# Patient Record
Sex: MALE | Race: BLACK OR AFRICAN AMERICAN | NOT HISPANIC OR LATINO | Employment: FULL TIME | URBAN - METROPOLITAN AREA
[De-identification: names, ages, dates, MRNs, and addresses within clinical notes are randomized per-mention and may not be internally consistent; named-entity substitution may affect disease eponyms.]

---

## 2017-02-27 ENCOUNTER — GENERIC CONVERSION - ENCOUNTER (OUTPATIENT)
Dept: OTHER | Facility: OTHER | Age: 52
End: 2017-02-27

## 2017-03-11 ENCOUNTER — TRANSCRIBE ORDERS (OUTPATIENT)
Dept: LAB | Facility: CLINIC | Age: 52
End: 2017-03-11

## 2017-03-11 ENCOUNTER — APPOINTMENT (OUTPATIENT)
Dept: LAB | Facility: CLINIC | Age: 52
End: 2017-03-11
Payer: COMMERCIAL

## 2017-03-11 DIAGNOSIS — N52.01 ERECTILE DYSFUNCTION DUE TO ARTERIAL INSUFFICIENCY: ICD-10-CM

## 2017-03-11 DIAGNOSIS — N52.01 ERECTILE DYSFUNCTION DUE TO ARTERIAL INSUFFICIENCY: Primary | ICD-10-CM

## 2017-03-11 PROCEDURE — 36415 COLL VENOUS BLD VENIPUNCTURE: CPT

## 2017-03-11 PROCEDURE — 84403 ASSAY OF TOTAL TESTOSTERONE: CPT

## 2017-03-11 PROCEDURE — 84402 ASSAY OF FREE TESTOSTERONE: CPT

## 2017-03-13 LAB
TESTOST FREE SERPL-MCNC: 6.2 PG/ML (ref 7.2–24)
TESTOST SERPL-MCNC: 268 NG/DL (ref 348–1197)
TESTOSTERONE COMMENT: ABNORMAL

## 2017-04-08 ENCOUNTER — APPOINTMENT (OUTPATIENT)
Dept: LAB | Facility: CLINIC | Age: 52
End: 2017-04-08
Payer: COMMERCIAL

## 2017-04-08 DIAGNOSIS — N52.01 ERECTILE DYSFUNCTION DUE TO ARTERIAL INSUFFICIENCY: ICD-10-CM

## 2017-04-08 LAB
FSH SERPL-ACNC: 7.9 MIU/ML (ref 0.7–10.8)
LH SERPL-ACNC: 3.5 MIU/ML (ref 1.2–10.6)
PROLACTIN SERPL-MCNC: 12.2 NG/ML (ref 2.5–17.4)

## 2017-04-08 PROCEDURE — 84403 ASSAY OF TOTAL TESTOSTERONE: CPT

## 2017-04-08 PROCEDURE — 84146 ASSAY OF PROLACTIN: CPT | Performed by: UROLOGY

## 2017-04-08 PROCEDURE — 83001 ASSAY OF GONADOTROPIN (FSH): CPT | Performed by: UROLOGY

## 2017-04-08 PROCEDURE — 84402 ASSAY OF FREE TESTOSTERONE: CPT

## 2017-04-08 PROCEDURE — 83002 ASSAY OF GONADOTROPIN (LH): CPT | Performed by: UROLOGY

## 2017-04-08 PROCEDURE — 36415 COLL VENOUS BLD VENIPUNCTURE: CPT

## 2017-04-10 LAB
TESTOST FREE SERPL-MCNC: 10.4 PG/ML (ref 7.2–24)
TESTOST SERPL-MCNC: 409 NG/DL (ref 348–1197)
TESTOSTERONE COMMENT: NORMAL

## 2018-02-09 ENCOUNTER — HOSPITAL ENCOUNTER (EMERGENCY)
Facility: HOSPITAL | Age: 53
Discharge: HOME | End: 2018-02-09
Attending: EMERGENCY MEDICINE
Payer: COMMERCIAL

## 2018-02-09 VITALS
RESPIRATION RATE: 16 BRPM | TEMPERATURE: 98.2 F | SYSTOLIC BLOOD PRESSURE: 157 MMHG | BODY MASS INDEX: 29 KG/M2 | DIASTOLIC BLOOD PRESSURE: 89 MMHG | WEIGHT: 213.85 LBS | OXYGEN SATURATION: 98 % | HEART RATE: 61 BPM

## 2018-02-09 DIAGNOSIS — T15.92XA FOREIGN BODY, EYE, LEFT, INITIAL ENCOUNTER: Primary | ICD-10-CM

## 2018-02-09 PROCEDURE — 99283 EMERGENCY DEPT VISIT LOW MDM: CPT

## 2018-02-09 PROCEDURE — 90471 IMMUNIZATION ADMIN: CPT

## 2018-02-09 PROCEDURE — 90715 TDAP VACCINE 7 YRS/> IM: CPT | Performed by: EMERGENCY MEDICINE

## 2018-02-09 RX ORDER — PROPARACAINE HYDROCHLORIDE 5 MG/ML
1 SOLUTION/ DROPS OPHTHALMIC ONCE
Status: COMPLETED | OUTPATIENT
Start: 2018-02-09 | End: 2018-02-09

## 2018-02-09 RX ORDER — TOBRAMYCIN 3 MG/ML
1 SOLUTION/ DROPS OPHTHALMIC ONCE
Status: COMPLETED | OUTPATIENT
Start: 2018-02-09 | End: 2018-02-09

## 2018-02-09 RX ADMIN — PROPARACAINE HYDROCHLORIDE 1 DROP: 5 SOLUTION/ DROPS OPHTHALMIC at 20:59

## 2018-02-09 RX ADMIN — TOBRAMYCIN 1 DROP: 3 SOLUTION OPHTHALMIC at 21:46

## 2018-02-09 RX ADMIN — TETANUS TOXOID, REDUCED DIPHTHERIA TOXOID AND ACELLULAR PERTUSSIS VACCINE, ADSORBED 0.5 ML: 5; 2.5; 8; 8; 2.5 SUSPENSION INTRAMUSCULAR at 20:58

## 2018-02-09 RX ADMIN — FLUORESCEIN SODIUM 1 STRIP: 0.6 STRIP OPHTHALMIC at 21:00

## 2018-02-10 NOTE — DISCHARGE INSTRUCTIONS
Eye Foreign Body   WHAT YOU NEED TO KNOW:   What is an eye foreign body (EFB)? An EFB is an object that gets stuck in your eye  Tiny pieces of metal, dust, wood, and sand are the most common foreign bodies  What are the signs and symptoms of an EFB? · The feeling that something is in your eye    · Eye pain, redness, or watering    · Sensitivity to light    · Blurry vision or changes in your vision  How is an EFB diagnosed? Your healthcare provider will ask about your symptoms and examine your eye  The provider may check your vision by asking you to read letters or numbers off of a chart  You may need any of the following:  · A slit-lamp test  uses a microscope to look into your eye and check for injury  A dye may be used to look for scratches or other damage to your eye  · Ultrasound or CT  pictures may show where the foreign body is located in your eye  It may also show damage to deeper parts of your eye  You may be given contrast liquid to help your eye show up better in pictures  Tell the healthcare provider if you have ever had an allergic reaction to contrast liquid  How is an EFB treated? Medicines will be given to decrease pain or prevent an infection  Your healthcare provider may numb your eye and flush it with liquid to help remove the FB  The provider may also use a cotton swab or other tools to help remove the FB  If the FB is hard to remove or has damaged deeper parts of your eye, you will need surgery to remove it  What can I do to help my eye heal?  You may have pain, sensitivity to light, or blurry vision for a few days  Do the following to help your eye heal:  · Do not rub your eye  This may cause more damage or infection  · Do not wear your contacts lenses until your eye heals  Ask your healthcare provider how long to follow this direction  · Wear sunglasses as directed  Sunglasses help protect the eye and decrease sensitivity to light    What can I do to prevent another EFB?   · Wear safety glasses, eye shields, or goggles  These items can prevent eye injury  Make sure the eyewear wraps around the sides of your face  Wear these items while you work with chemicals, metal, wood, or bodily fluids such as blood  Also wear protective eyewear during sports such as racquetball or swimming  Do not use regular eye glasses for eye protection  They will not protect your eyes from foreign bodies or chemicals  · Use contact lenses as directed  Wash your hands before you clean, insert, or remove your contacts  Insert and remove contact lenses correctly  Clean and change your contacts as directed to help prevent eye damage or infection  When should I seek immediate care? · You suddenly lose your vision  · You have severe eye pain  When should I contact my healthcare provider? · You have new or worse eye swelling  · Your symptoms do not get better, even after the foreign body is removed  · You have white or yellow fluid draining from your eye  · You have questions or concerns about your condition or care  CARE AGREEMENT:   You have the right to help plan your care  Learn about your health condition and how it may be treated  Discuss treatment options with your caregivers to decide what care you want to receive  You always have the right to refuse treatment  The above information is an  only  It is not intended as medical advice for individual conditions or treatments  Talk to your doctor, nurse or pharmacist before following any medical regimen to see if it is safe and effective for you  © 2017 [address] Information is for End User's use only and may not be sold, redistributed or otherwise used for commercial purposes  All illustrations and images included in CareNotes® are the copyrighted property of A D [employer] M [employer]  or Gumaro Maher

## 2018-02-10 NOTE — ED PROVIDER NOTES
History  Chief Complaint   Patient presents with    Foreign Body in Eye     pt presents with left eye redness and irritation since last night  pt reports he was cutting metal and believes a piece got in his eye     15-year-old male presents for evaluation of a suspected metallic foreign body to his eye  He was working with steel tonight at his job, his glasses had fogged up so he was not wearing them appropriately  He felt a metallic foreign body hit his eye so he comes to the emergency department for evaluation  He states this happened in the past   He has an unknown last tetanus  He is having some visual difficulty because of the metallic foreign body in pain  He states that he wears reading glasses but does not use contact lenses  No pain with extraocular movement  He has no other complaints at this time  None       History reviewed  No pertinent past medical history  History reviewed  No pertinent surgical history  History reviewed  No pertinent family history  I have reviewed and agree with the history as documented  Social History   Substance Use Topics    Smoking status: Never Smoker    Smokeless tobacco: Never Used    Alcohol use No        Review of Systems   Constitutional: Negative for chills and fever  HENT: Negative for congestion and sore throat  Eyes: Positive for pain, redness and visual disturbance  Negative for photophobia, discharge and itching  Respiratory: Negative for chest tightness and shortness of breath  Cardiovascular: Negative for chest pain  Gastrointestinal: Negative for abdominal pain, nausea and vomiting  Musculoskeletal: Negative for back pain, neck pain and neck stiffness  Skin: Negative for rash and wound  Allergic/Immunologic: Negative for immunocompromised state  Neurological: Negative for dizziness and headaches         Physical Exam  ED Triage Vitals [02/09/18 2006]   Temperature Pulse Respirations Blood Pressure SpO2   98 2 °F (36 8 °C) 61 16 157/89 98 %      Temp Source Heart Rate Source Patient Position - Orthostatic VS BP Location FiO2 (%)   Oral Monitor Lying Right arm --      Pain Score       No Pain           Orthostatic Vital Signs  Vitals:    02/09/18 2006   BP: 157/89   Pulse: 61   Patient Position - Orthostatic VS: Lying       Physical Exam   Constitutional: He is oriented to person, place, and time  He appears well-developed and well-nourished  No distress  HENT:   Head: Normocephalic and atraumatic  Mouth/Throat: Oropharynx is clear and moist    Eyes: EOM are normal  Left eye exhibits no discharge  Left eye with a metal foreign body visualized in the cornea  Neck: Normal range of motion  Neck supple  Pulmonary/Chest: Effort normal and breath sounds normal  No respiratory distress  Musculoskeletal: Normal range of motion  Neurological: He is alert and oriented to person, place, and time  No cranial nerve deficit  Skin: Skin is warm and dry  He is not diaphoretic  No pallor  Psychiatric: He has a normal mood and affect  His behavior is normal    Vitals reviewed        ED Medications  Medications   proparacaine (ALCAINE) 0 5 % ophthalmic solution 1 drop (1 drop Both Eyes Given 2/9/18 2059)   fluorescein sodium sterile ophthalmic strip 1 strip (1 strip Left Eye Given 2/9/18 2100)   tetanus-diphtheria-acellular pertussis (BOOSTRIX) IM injection 0 5 mL (0 5 mL Intramuscular Given 2/9/18 2058)   tobramycin (TOBREX) 0 3 % ophthalmic solution 1 drop (1 drop Left Eye Given 2/9/18 2146)       Diagnostic Studies  Results Reviewed     None                 No orders to display              Procedures  Foreign Body - Ocular  Date/Time: 2/9/2018 10:02 PM  Performed by: Irvin Overall by: Marti Redman     Patient location:  ED  Other Assisting Provider: Yes (comment) (Nurse)    Consent:     Consent obtained:  Verbal    Consent given by:  Patient    Risks discussed:  Incomplete removal, visual impairment, worsening of condition, pain, infection, corneal damage and bleeding    Alternatives discussed:  No treatment and delayed treatment  Universal protocol:     Patient identity confirmed:  Verbally with patient  Location:     Location:  L conjunctival    Depth:  Embedded  Pre-procedure details:     Imaging:  None    Fluorescein exam: yes      Fluorescein uptake: yes      Gracia test: negative      Corneal abrasion location:  Medial  Anesthesia (see MAR for exact dosages):     Local anesthetic:  Proparacaine drops  Procedure details:     Localization method:  Direct visualization    Removal mechanism:  25 G needle    Foreign bodies recovered:  1    Description:  Metal    Intact foreign body removal: yes      Residual rust ring observed: no      Residual rust ring removed: no    Post-procedure details:     Confirmation:  No additional foreign bodies on visualization    Dressing:  Antibiotic drops    Patient tolerance of procedure: Tolerated well, no immediate complications           Phone Contacts  ED Phone Contact    ED Course  ED Course                                MDM  Number of Diagnoses or Management Options  Foreign body, eye, left, initial encounter:   Diagnosis management comments: Ocular foreign body  Attempted removal with a needle  Ocular foreign body seems to be removed at this time  Advised the patient follow-up with [facility]  Tetanus shot updated  Discussed with patient and they understood the risks and benefits of discharge  Patient had opportunity to ask questions regarding care and discharge instructions and had no further questions  Advised follow up with PCP, advised returning if worsening, and discussed disease specific return precautions  Patient understood discharge instructions         CritCare Time    Disposition  Final diagnoses:   Foreign body, eye, left, initial encounter     Time reflects when diagnosis was documented in both MDM as applicable and the Disposition within this note     Time User Action Codes Description Comment    2/9/2018 10:00 PM [place], [address] Foreign body, eye, left, initial encounter       ED Disposition     ED Disposition Condition Comment    Discharge  Jone Garcia discharge to home/self care  Condition at discharge: Good        Follow-up Information     Follow up With Specialties Details Why Contact Info Additional [facility] Emergency Department Emergency Medicine  If symptoms worsen: worsening vision, eye pain, fever/chills, etc [phone] [place] ED, [address], [place], [address] Ophthalmology Call in the morning for an appointment to be seen as soon as possible [address]  [phone]           There are no discharge medications for this patient  No discharge procedures on file      ED Provider  Electronically Signed by           Joao Mitchell DO  02/09/18 2190

## 2018-12-21 ENCOUNTER — HOSPITAL ENCOUNTER (EMERGENCY)
Facility: HOSPITAL | Age: 53
Discharge: HOME | End: 2018-12-21
Attending: EMERGENCY MEDICINE | Admitting: EMERGENCY MEDICINE
Payer: COMMERCIAL

## 2018-12-21 ENCOUNTER — APPOINTMENT (EMERGENCY)
Dept: RADIOLOGY | Facility: HOSPITAL | Age: 53
End: 2018-12-21
Payer: COMMERCIAL

## 2018-12-21 VITALS
BODY MASS INDEX: 28.85 KG/M2 | TEMPERATURE: 98.2 F | HEIGHT: 72 IN | WEIGHT: 213 LBS | HEART RATE: 71 BPM | OXYGEN SATURATION: 97 % | RESPIRATION RATE: 20 BRPM | DIASTOLIC BLOOD PRESSURE: 89 MMHG | SYSTOLIC BLOOD PRESSURE: 148 MMHG

## 2018-12-21 DIAGNOSIS — M25.519 SHOULDER PAIN: Primary | ICD-10-CM

## 2018-12-21 PROCEDURE — 73030 X-RAY EXAM OF SHOULDER: CPT

## 2018-12-21 PROCEDURE — 99283 EMERGENCY DEPT VISIT LOW MDM: CPT

## 2018-12-21 RX ORDER — IBUPROFEN 600 MG/1
600 TABLET ORAL ONCE
Status: COMPLETED | OUTPATIENT
Start: 2018-12-21 | End: 2018-12-21

## 2018-12-21 RX ORDER — METHOCARBAMOL 500 MG/1
1000 TABLET, FILM COATED ORAL ONCE
Status: COMPLETED | OUTPATIENT
Start: 2018-12-21 | End: 2018-12-21

## 2018-12-21 RX ORDER — DIAZEPAM 10 MG/1
TABLET ORAL
Qty: 12 TABLET | Refills: 0 | Status: SHIPPED | OUTPATIENT
Start: 2018-12-21

## 2018-12-21 RX ORDER — IBUPROFEN 600 MG/1
600 TABLET ORAL EVERY 6 HOURS PRN
Qty: 30 TABLET | Refills: 0 | Status: SHIPPED | OUTPATIENT
Start: 2018-12-21 | End: 2018-12-24

## 2018-12-21 RX ADMIN — IBUPROFEN 600 MG: 600 TABLET ORAL at 21:36

## 2018-12-21 RX ADMIN — METHOCARBAMOL 1000 MG: 500 TABLET ORAL at 21:36

## 2018-12-22 NOTE — DISCHARGE INSTRUCTIONS
Shoulder Pain, Ambulatory Care   GENERAL INFORMATION:   Shoulder pain  is a common problem and can affect your daily activities  Pain can be caused by a problem within your shoulder  Shoulder pain may also be caused by pain that spreads to your shoulder from another part of your body  Seek immediate care for the following symptoms:   · Severe pain    · Inability to move your arm or shoulder    · Numbness or tingling in your shoulder or arm  Treatment for shoulder pain  may include any of the following:  · Acetaminophen  decreases pain and fever  It is available without a doctor's order  Ask how much to take and how often to take it  Follow directions  Acetaminophen can cause liver damage if not taken correctly  · NSAIDs  help decrease swelling and pain or fever  This medicine is available with or without a doctor's order  NSAIDs can cause stomach bleeding or kidney problems in certain people  If you take blood thinner medicine, always ask your healthcare provider if NSAIDs are safe for you  Always read the medicine label and follow directions  · A steroid injection  may help decrease pain and swelling  · Surgery  may be needed for long-term pain and loss of function  Manage your symptoms:   · Apply ice  on your shoulder for 20 to 30 minutes every 2 hours or as directed  Use an ice pack, or put crushed ice in a plastic bag  Cover it with a towel  Ice helps prevent tissue damage and decreases swelling and pain  · Apply heat if ice does not help your symptoms  Apply heat on your shoulder for 20 to 30 minutes every 2 hours for as many days as directed  Heat helps decrease pain and muscle spasms  · Go to physical or occupational therapy as directed  A physical therapist teaches you exercises to help improve movement and strength, and to decrease pain  An occupational therapist teaches you skills to help with your daily activities  Prevent shoulder pain:   · Stretch and strengthen your shoulder  Use proper technique during exercises and sports  · Limit activities as directed  Try to avoid repeated overhead movements  Follow up with your healthcare provider or orthopedist as directed:  Write down your questions so you remember to ask them during your visits  CARE AGREEMENT:   You have the right to help plan your care  Learn about your health condition and how it may be treated  Discuss treatment options with your caregivers to decide what care you want to receive  You always have the right to refuse treatment  The above information is an  only  It is not intended as medical advice for individual conditions or treatments  Talk to your doctor, nurse or pharmacist before following any medical regimen to see if it is safe and effective for you  © 2014 [address] is for End User's use only and may not be sold, redistributed or otherwise used for commercial purposes  All illustrations and images included in CareNotes® are the copyrighted property of A D A M , Inc  or Gumaro Maher

## 2018-12-22 NOTE — ED PROVIDER NOTES
History  Chief Complaint   Patient presents with    Shoulder Pain     pt presents ambulatory with c/o L shoulder pain x1 week, denies injury     [de-identified] year old male patient presents emergency department for evaluation of left shoulder pain states atraumatic  The patient does work in construction, he is lifting heavy objects frequently, states the pain has been worsening over the last week  Patient has no nidus of injury or any other source of damage to the shoulder done than chronic use  The area is normal in its appearance, there is no bony or soft tissue abnormalities  There is no rash or other physical exam findings that would explain the patient's pain  The patient does not have tenderness of the bicipital groove  Patient will have an x-ray done of the shoulder to assess for any underlying bony abnormalities and if not are present patient be treated for degenerative changes  X-rays reviewed interpreted by me and shows no acute osseous abnormalities  Patient will be discharged with antispasmodics and anti-inflammatories  History provided by:  Patient   used: No    Shoulder Pain   Location:  Shoulder  Shoulder location:  L shoulder  Injury: no    Pain details:     Quality:  Aching    Radiates to:  Does not radiate    Severity:  Mild    Onset quality:  Gradual    Timing:  Constant    Progression:  Unchanged  Foreign body present:  No foreign bodies  Prior injury to area:  No  Relieved by:  Nothing  Worsened by:  Nothing  Ineffective treatments:  None tried  Associated symptoms: stiffness    Associated symptoms: no fatigue, no fever, no neck pain and no tingling    Risk factors: no concern for non-accidental trauma        None       History reviewed  No pertinent past medical history  History reviewed  No pertinent surgical history  History reviewed  No pertinent family history  I have reviewed and agree with the history as documented      Social History   Substance Use Topics    Smoking status: Never Smoker    Smokeless tobacco: Never Used    Alcohol use No        Review of Systems   Constitutional: Negative for fatigue and fever  Musculoskeletal: Positive for stiffness  Negative for neck pain  All other systems reviewed and are negative  Physical Exam  Physical Exam   Constitutional: He is oriented to person, place, and time  He appears well-developed and well-nourished  No distress  HENT:   Head: Normocephalic and atraumatic  Right Ear: External ear normal    Left Ear: External ear normal    Eyes: Conjunctivae and EOM are normal  Right eye exhibits no discharge  Left eye exhibits no discharge  No scleral icterus  Neck: Normal range of motion  Neck supple  No JVD present  No tracheal deviation present  No thyromegaly present  Cardiovascular: Normal rate and regular rhythm  Pulmonary/Chest: Effort normal and breath sounds normal  No stridor  No respiratory distress  He has no wheezes  He has no rales  Abdominal: Soft  Bowel sounds are normal  He exhibits no distension  There is no tenderness  Musculoskeletal: Normal range of motion  He exhibits no edema, tenderness or deformity  Neurological: He is alert and oriented to person, place, and time  No cranial nerve deficit  Coordination normal    Skin: Skin is warm and dry  He is not diaphoretic  Psychiatric: He has a normal mood and affect  His behavior is normal    Nursing note and vitals reviewed        Vital Signs  ED Triage Vitals   Temperature Pulse Respirations Blood Pressure SpO2   12/21/18 2023 12/21/18 2021 12/21/18 2021 12/21/18 2021 12/21/18 2021   98 2 °F (36 8 °C) 71 20 148/89 97 %      Temp Source Heart Rate Source Patient Position - Orthostatic VS BP Location FiO2 (%)   12/21/18 2023 12/21/18 2021 12/21/18 2021 12/21/18 2021 --   Oral Monitor Sitting Right arm       Pain Score       12/21/18 2136       6           Vitals:    12/21/18 2021   BP: 148/89   Pulse: 71   Patient Position - Orthostatic VS: Sitting       Visual Acuity      ED Medications  Medications   methocarbamol (ROBAXIN) tablet 1,000 mg (1,000 mg Oral Given 12/21/18 2136)   ibuprofen (MOTRIN) tablet 600 mg (600 mg Oral Given 12/21/18 2136)       Diagnostic Studies  Results Reviewed     None                 XR shoulder 2+ views LEFT   ED Interpretation by Amisha Maria DO (12/21 2127)   Normal exam      Final Result by Callie Martino DO (12/22 6096)   Degenerative changes of the shoulder and acromioclavicular joint  No acute osseous abnormality  Workstation performed: [id number]                    Procedures  Procedures       Phone Contacts  ED Phone Contact    ED Course                               MDM  Number of Diagnoses or Management Options  Shoulder pain: new and requires workup     Amount and/or Complexity of Data Reviewed  Tests in the radiology section of CPT®: reviewed and ordered  Decide to obtain previous medical records or to obtain history from someone other than the patient: yes  Review and summarize past medical records: yes    Patient Progress  Patient progress: stable    CritCare Time    Disposition  Final diagnoses:   Shoulder pain     Time reflects when diagnosis was documented in both MDM as applicable and the Disposition within this note     Time User Action Codes Description Comment    12/21/2018  9:28 PM Simon Litten Add [M25 519] Shoulder pain       ED Disposition     ED Disposition Condition Comment    Discharge  [place] discharge to home/self care      Condition at discharge: Good        Follow-up Information     Follow up With Specialties Details Why [address] Rosemary Lomeli MD Internal Medicine   [facility]  [phone]      Yue P[place], [facility] [id number]            Discharge Medication List as of 12/21/2018  9:30 PM      START taking these medications    Details   diazepam (VALIUM) 10 mg tablet Take on every 8 hours for three days as needed, Normal      ibuprofen (MOTRIN) 600 mg tablet Take 1 tablet (600 mg total) by mouth every 6 (six) hours as needed for mild pain for up to 3 days, Starting Fri 12/21/2018, Until Mon 12/24/2018, Normal           No discharge procedures on file      ED Provider  Electronically Signed by           Lana Medeiros DO  12/22/18 [place]

## 2019-11-05 ENCOUNTER — HOSPITAL ENCOUNTER (EMERGENCY)
Facility: HOSPITAL | Age: 54
Discharge: HOME | End: 2019-11-05
Attending: EMERGENCY MEDICINE
Payer: COMMERCIAL

## 2019-11-05 VITALS
OXYGEN SATURATION: 98 % | TEMPERATURE: 98.6 F | BODY MASS INDEX: 28.91 KG/M2 | RESPIRATION RATE: 16 BRPM | DIASTOLIC BLOOD PRESSURE: 90 MMHG | HEIGHT: 72 IN | WEIGHT: 213.41 LBS | HEART RATE: 72 BPM | SYSTOLIC BLOOD PRESSURE: 148 MMHG

## 2019-11-05 DIAGNOSIS — S86.112A RUPTURE OF LEFT GASTROCNEMIUS TENDON, INITIAL ENCOUNTER: Primary | ICD-10-CM

## 2019-11-05 PROCEDURE — 99284 EMERGENCY DEPT VISIT MOD MDM: CPT | Performed by: NURSE PRACTITIONER

## 2019-11-05 PROCEDURE — 99283 EMERGENCY DEPT VISIT LOW MDM: CPT

## 2019-11-05 RX ORDER — HYDROCODONE BITARTRATE AND ACETAMINOPHEN 5; 325 MG/1; MG/1
1 TABLET ORAL EVERY 6 HOURS PRN
Qty: 12 TABLET | Refills: 0 | Status: SHIPPED | OUTPATIENT
Start: 2019-11-05

## 2019-11-05 RX ORDER — NAPROXEN 500 MG/1
500 TABLET ORAL 2 TIMES DAILY WITH MEALS
Qty: 20 TABLET | Refills: 0 | Status: SHIPPED | OUTPATIENT
Start: 2019-11-05 | End: 2021-12-03

## 2019-11-05 NOTE — ED PROVIDER NOTES
History  Chief Complaint   Patient presents with    Leg Pain     Pt reports he was running to catch to the train yesterday, and felt something hit him in the left calf, not sure what  C/o left leg pain, diff bearing weight  This is a 52-year-old male patient was running to catch a train yesterday and felt a pop or snap over his left posterior leg over the medial gastrocnemius  My concern is that he partially tore his gastrocnemius and is at risk for Achilles tendon tear  Prior to Admission Medications   Prescriptions Last Dose Informant Patient Reported? Taking?   diazepam (VALIUM) 10 mg tablet   No No   Sig: Take on every 8 hours for three days as needed   ibuprofen (MOTRIN) 600 mg tablet   No No   Sig: Take 1 tablet (600 mg total) by mouth every 6 (six) hours as needed for mild pain for up to 3 days      Facility-Administered Medications: None       History reviewed  No pertinent past medical history  History reviewed  No pertinent surgical history  History reviewed  No pertinent family history  I have reviewed and agree with the history as documented  Social History     Tobacco Use    Smoking status: Never Smoker    Smokeless tobacco: Never Used   Substance Use Topics    Alcohol use: No    Drug use: No        Review of Systems   Constitutional: Negative for diaphoresis, fatigue and fever  HENT: Negative for congestion, ear pain, nosebleeds and sore throat  Eyes: Negative for photophobia, pain, discharge and visual disturbance  Respiratory: Negative for cough, choking, chest tightness, shortness of breath and wheezing  Cardiovascular: Negative for chest pain and palpitations  Gastrointestinal: Negative for abdominal distention, abdominal pain, diarrhea and vomiting  Genitourinary: Negative for dysuria, flank pain and frequency  Musculoskeletal: Negative for back pain, gait problem and joint swelling  Skin: Negative for color change and rash     Neurological: Negative for dizziness, syncope and headaches  Psychiatric/Behavioral: Negative for behavioral problems and confusion  The patient is not nervous/anxious  All other systems reviewed and are negative  Physical Exam  Physical Exam   Constitutional: He is oriented to person, place, and time  He appears well-developed and well-nourished  No distress  HENT:   Head: Normocephalic and atraumatic  Eyes: Conjunctivae and EOM are normal  Right eye exhibits no discharge  Left eye exhibits no discharge  Neck: Normal range of motion  Neck supple  Cardiovascular: Normal rate  Pulmonary/Chest: Effort normal  No respiratory distress  Abdominal: He exhibits no distension  There is no guarding  Musculoskeletal: He exhibits no edema or deformity  Legs:  Tenderness over the gastrocnemius tendon  He has full plantar and dorsiflexion of the affected extremity  Palpable Achilles cord  Neurological: He is alert and oriented to person, place, and time  Coordination normal    Skin: Skin is warm and dry  Psychiatric: He has a normal mood and affect  Nursing note and vitals reviewed        Vital Signs  ED Triage Vitals [11/05/19 0827]   Temperature Pulse Respirations Blood Pressure SpO2   98 6 °F (37 °C) 72 16 148/90 98 %      Temp Source Heart Rate Source Patient Position - Orthostatic VS BP Location FiO2 (%)   Oral Monitor Sitting Right arm --      Pain Score       8           Vitals:    11/05/19 0827   BP: 148/90   Pulse: 72   Patient Position - Orthostatic VS: Sitting         Visual Acuity      ED Medications  Medications - No data to display    Diagnostic Studies  Results Reviewed     None                 No orders to display              Procedures  Procedures       ED Course                               MDM  Number of Diagnoses or Management Options  Rupture of left gastrocnemius tendon, initial encounter: new and requires workup  Diagnosis management comments: Concern for gastrocnemius tendon rupture given history  Recommend nonweightbearing to evaluated by Orthopedics  Will likely need some physical therapy  Verbally discussed restrictions and concerns  Patient Progress  Patient progress: stable      Disposition  Final diagnoses:   Rupture of left gastrocnemius tendon, initial encounter     Time reflects when diagnosis was documented in both MDM as applicable and the Disposition within this note     Time User Action Codes Description Comment    11/5/2019  9:36 AM Shakeel Mark Add [[id number] [id number]] Rupture of left gastrocnemius tendon, initial encounter       ED Disposition     ED Disposition Condition Date/Time Comment    Discharge Stable Tue Nov 5, 2019  9:35 AM Joshua Sneed discharge to home/self care  Follow-up Information     Follow up With Specialties Details Why Contact Info    [employer], DO Sports Medicine Schedule an appointment as soon as possible for a visit  For Continued Evaluation [address] [place] [address] [employer]  [address]  [place] [address]  [phone]            Patient's Medications   Discharge Prescriptions    HYDROCODONE-ACETAMINOPHEN (NORCO) 5-325 MG PER TABLET    Take 1 tablet by mouth every 6 (six) hours as needed (Not relieved by Anti-inflammatory) for up to 12 dosesMax Daily Amount: 4 tablets       Start Date: 11/5/2019 End Date: --       Order Dose: 1 tablet       Quantity: 12 tablet    Refills: 0    NAPROXEN (NAPROSYN) 500 MG TABLET    Take 1 tablet (500 mg total) by mouth 2 (two) times a day with meals for 20 doses       Start Date: 11/5/2019 End Date: 11/15/2019       Order Dose: 500 mg       Quantity: 20 tablet    Refills: 0     No discharge procedures on file      ED Provider  Electronically Signed by           FRANCIS Minor  11/05/19 4201

## 2019-11-05 NOTE — DISCHARGE INSTRUCTIONS
Recommend nonweightbearing until evaluated by the orthopedic sports medicine doctor  Call today to schedule follow-up appointment

## 2019-11-06 ENCOUNTER — OFFICE VISIT (OUTPATIENT)
Dept: OBGYN CLINIC | Facility: MEDICAL CENTER | Age: 54
End: 2019-11-06
Payer: COMMERCIAL

## 2019-11-06 VITALS
WEIGHT: 210 LBS | HEIGHT: 72 IN | DIASTOLIC BLOOD PRESSURE: 81 MMHG | BODY MASS INDEX: 28.44 KG/M2 | HEART RATE: 79 BPM | SYSTOLIC BLOOD PRESSURE: 129 MMHG

## 2019-11-06 DIAGNOSIS — S86.112A STRAIN OF GASTROCNEMIUS MUSCLE OF LEFT LOWER EXTREMITY, INITIAL ENCOUNTER: Primary | ICD-10-CM

## 2019-11-06 DIAGNOSIS — M79.662 PAIN OF LEFT CALF: ICD-10-CM

## 2019-11-06 PROCEDURE — 99203 OFFICE O/P NEW LOW 30 MIN: CPT | Performed by: ORTHOPAEDIC SURGERY

## 2019-11-06 RX ORDER — ASPIRIN 81 MG/1
81 TABLET ORAL DAILY
COMMUNITY

## 2019-11-06 NOTE — LETTER
November 6, 2019     Patient: Simon Cook   YOB: 1965   Date of Visit: 11/6/2019       To Whom it May Concern:    Simon Cook is under my professional care  He was seen in my office on 11/6/2019  He may return to work on 11/11/19 with the restriction of no climbing or ladders  If you have any questions or concerns, please don't hesitate to call           Sincerely,          Bronwyn Byrnes MD        CC: No Recipients

## 2019-11-06 NOTE — PROGRESS NOTES
Assessment:   Diagnosis ICD-10-CM Associated Orders   1  Strain of gastrocnemius muscle of left lower extremity, initial encounter S86 112A    2  Pain of left calf M79 662        Plan:  The patient has an examination consistent with a gastroc strain  I have discussed with the patient the pathophysiology of this diagnosis and reviewed how the examination correlates with this diagnosis  Treatment options were discussed at length and after discussing these treatment options, the patient was instructed to ace wrap the calf  He may return to work with the restriction of no climbing ladders  He was also provided with stretching exercises  Continue Naproxen with food  To do next visit:  Return in about 4 weeks (around 12/4/2019)  The above stated was discussed in layman's terms and the patient expressed understanding  All questions were answered to the patient's satisfaction  Subjective:   Naren Ramirez is a 49 y o  male who presents with a chief complaint of left calf pain  He states he was running to catch the train on 11/04/19 when he it felt like something hit him in the calf  He was seen in the ED yesterday who was concerned about a possible gastrocnemius tear  He presents today on crutches with an ace wrap around the calf  Patient states he has increased pain with WB and is relieved by rest  He denies any numbness or tingling in the left lower extremity  Review of systems negative unless otherwise specified in HPI    History reviewed  No pertinent past medical history      Past Surgical History:   Procedure Laterality Date    CARDIAC SURGERY      a-fib 2013       Family History   Problem Relation Age of Onset    Diabetes Mother     No Known Problems Father        Social History     Occupational History    Not on file   Tobacco Use    Smoking status: Never Smoker    Smokeless tobacco: Never Used   Substance and Sexual Activity    Alcohol use: Yes     Frequency: 2-4 times a month    Drug use: No    Sexual activity: Not on file         Current Outpatient Medications:     aspirin (ECOTRIN LOW STRENGTH) 81 mg EC tablet, Take 81 mg by mouth daily, Disp: , Rfl:     diazepam (VALIUM) 10 mg tablet, Take on every 8 hours for three days as needed, Disp: 12 tablet, Rfl: 0    HYDROcodone-acetaminophen (NORCO) 5-325 mg per tablet, Take 1 tablet by mouth every 6 (six) hours as needed (Not relieved by Anti-inflammatory) for up to 12 dosesMax Daily Amount: 4 tablets, Disp: 12 tablet, Rfl: 0    ibuprofen (MOTRIN) 600 mg tablet, Take 1 tablet (600 mg total) by mouth every 6 (six) hours as needed for mild pain for up to 3 days, Disp: 30 tablet, Rfl: 0    naproxen (NAPROSYN) 500 mg tablet, Take 1 tablet (500 mg total) by mouth 2 (two) times a day with meals for 20 doses (Patient not taking: Reported on 11/6/2019), Disp: 20 tablet, Rfl: 0    No Known Allergies         Vitals:    11/06/19 0935   BP: 129/81   Pulse: 79       Objective:                    Right Ankle Exam     Tenderness   Right ankle tenderness location: medial gastrocnemius  Swelling: mild    Range of Motion   The patient has normal right ankle ROM  Muscle Strength   The patient has normal right ankle strength  Other   Erythema: absent  Sensation: normal  Pulse: present     Comments:    Negative Brambila test  Achilles is palpable and non tender on exam today            Diagnostics, reviewed and taken today if performed as documented:    None performed        Procedures, if performed today:    Procedures    None performed      Portions of the record may have been created with voice recognition software  Occasional wrong word or "sound a like" substitutions may have occurred due to the inherent limitations of voice recognition software  Read the chart carefully and recognize, using context, where substitutions have occurred        Scribe Attestation    I,:   Leobardo Brice am acting as a scribe while in the presence of the attending physician :        I,:   Clare Lui MD personally performed the services described in this documentation    as scribed in my presence :

## 2020-01-28 ENCOUNTER — APPOINTMENT (EMERGENCY)
Dept: RADIOLOGY | Facility: HOSPITAL | Age: 55
End: 2020-01-28
Payer: COMMERCIAL

## 2020-01-28 ENCOUNTER — HOSPITAL ENCOUNTER (EMERGENCY)
Facility: HOSPITAL | Age: 55
Discharge: HOME | End: 2020-01-29
Attending: EMERGENCY MEDICINE | Admitting: EMERGENCY MEDICINE
Payer: COMMERCIAL

## 2020-01-28 VITALS
HEART RATE: 63 BPM | TEMPERATURE: 98.3 F | DIASTOLIC BLOOD PRESSURE: 89 MMHG | SYSTOLIC BLOOD PRESSURE: 138 MMHG | HEIGHT: 72 IN | BODY MASS INDEX: 28.22 KG/M2 | WEIGHT: 208.34 LBS | OXYGEN SATURATION: 97 % | RESPIRATION RATE: 20 BRPM

## 2020-01-28 DIAGNOSIS — R07.9 CHEST PAIN: Primary | ICD-10-CM

## 2020-01-28 LAB
ALBUMIN SERPL BCP-MCNC: 3.8 G/DL (ref 3.5–5)
ALP SERPL-CCNC: 68 U/L (ref 46–116)
ALT SERPL W P-5'-P-CCNC: 35 U/L (ref 12–78)
ANION GAP SERPL CALCULATED.3IONS-SCNC: 5 MMOL/L (ref 4–13)
AST SERPL W P-5'-P-CCNC: 23 U/L (ref 5–45)
BASOPHILS # BLD AUTO: 0.04 THOUSANDS/ΜL (ref 0–0.1)
BASOPHILS NFR BLD AUTO: 1 % (ref 0–1)
BILIRUB SERPL-MCNC: 0.5 MG/DL (ref 0.2–1)
BUN SERPL-MCNC: 15 MG/DL (ref 5–25)
CALCIUM SERPL-MCNC: 8.7 MG/DL (ref 8.3–10.1)
CHLORIDE SERPL-SCNC: 105 MMOL/L (ref 100–108)
CO2 SERPL-SCNC: 29 MMOL/L (ref 21–32)
CREAT SERPL-MCNC: 0.97 MG/DL (ref 0.6–1.3)
EOSINOPHIL # BLD AUTO: 0.56 THOUSAND/ΜL (ref 0–0.61)
EOSINOPHIL NFR BLD AUTO: 12 % (ref 0–6)
ERYTHROCYTE [DISTWIDTH] IN BLOOD BY AUTOMATED COUNT: 12.2 % (ref 11.6–15.1)
GFR SERPL CREATININE-BSD FRML MDRD: 103 ML/MIN/1.73SQ M
GLUCOSE SERPL-MCNC: 94 MG/DL (ref 65–140)
HCT VFR BLD AUTO: 41.4 % (ref 36.5–49.3)
HGB BLD-MCNC: 14 G/DL (ref 12–17)
IMM GRANULOCYTES # BLD AUTO: 0.01 THOUSAND/UL (ref 0–0.2)
IMM GRANULOCYTES NFR BLD AUTO: 0 % (ref 0–2)
LYMPHOCYTES # BLD AUTO: 1.91 THOUSANDS/ΜL (ref 0.6–4.47)
LYMPHOCYTES NFR BLD AUTO: 43 % (ref 14–44)
MCH RBC QN AUTO: 29 PG (ref 26.8–34.3)
MCHC RBC AUTO-ENTMCNC: 33.8 G/DL (ref 31.4–37.4)
MCV RBC AUTO: 86 FL (ref 82–98)
MONOCYTES # BLD AUTO: 0.42 THOUSAND/ΜL (ref 0.17–1.22)
MONOCYTES NFR BLD AUTO: 9 % (ref 4–12)
NEUTROPHILS # BLD AUTO: 1.56 THOUSANDS/ΜL (ref 1.85–7.62)
NEUTS SEG NFR BLD AUTO: 35 % (ref 43–75)
NRBC BLD AUTO-RTO: 0 /100 WBCS
PLATELET # BLD AUTO: 164 THOUSANDS/UL (ref 149–390)
PMV BLD AUTO: 12.2 FL (ref 8.9–12.7)
POTASSIUM SERPL-SCNC: 4.2 MMOL/L (ref 3.5–5.3)
PROT SERPL-MCNC: 7.4 G/DL (ref 6.4–8.2)
RBC # BLD AUTO: 4.83 MILLION/UL (ref 3.88–5.62)
SODIUM SERPL-SCNC: 139 MMOL/L (ref 136–145)
TROPONIN I SERPL-MCNC: <0.02 NG/ML
WBC # BLD AUTO: 4.5 THOUSAND/UL (ref 4.31–10.16)

## 2020-01-28 PROCEDURE — 84484 ASSAY OF TROPONIN QUANT: CPT | Performed by: PHYSICIAN ASSISTANT

## 2020-01-28 PROCEDURE — 36415 COLL VENOUS BLD VENIPUNCTURE: CPT | Performed by: PHYSICIAN ASSISTANT

## 2020-01-28 PROCEDURE — 80053 COMPREHEN METABOLIC PANEL: CPT | Performed by: PHYSICIAN ASSISTANT

## 2020-01-28 PROCEDURE — 71046 X-RAY EXAM CHEST 2 VIEWS: CPT

## 2020-01-28 PROCEDURE — 93005 ELECTROCARDIOGRAM TRACING: CPT

## 2020-01-28 PROCEDURE — 85025 COMPLETE CBC W/AUTO DIFF WBC: CPT | Performed by: PHYSICIAN ASSISTANT

## 2020-01-28 PROCEDURE — 99285 EMERGENCY DEPT VISIT HI MDM: CPT

## 2020-01-29 PROBLEM — R07.9 CHEST PAIN: Status: ACTIVE | Noted: 2020-01-29

## 2020-01-29 LAB
ATRIAL RATE: 63 BPM
P AXIS: 10 DEGREES
PR INTERVAL: 152 MS
QRS AXIS: 52 DEGREES
QRSD INTERVAL: 88 MS
QT INTERVAL: 402 MS
QTC INTERVAL: 411 MS
T WAVE AXIS: 52 DEGREES
VENTRICULAR RATE: 63 BPM

## 2020-01-29 PROCEDURE — 93010 ELECTROCARDIOGRAM REPORT: CPT | Performed by: INTERNAL MEDICINE

## 2020-01-29 PROCEDURE — 99283 EMERGENCY DEPT VISIT LOW MDM: CPT | Performed by: PHYSICIAN ASSISTANT

## 2020-01-29 NOTE — ED PROVIDER NOTES
History  Chief Complaint   Patient presents with    Chest Pain     pt c/o chest pain and left sided numbness lthat started a couple hours ago, hx of Afib     Patient is a 67-year-old male presents emergency department with complaints of chest pain that began 2 hours ago  Patient states the pain is nonradiating  He 20 shortness of breath, fever, chills, abdominal pain, nausea, vomiting, dizziness  Patient states he has had history of atrial fibrillation the past which was treated with ablation  Patient states the pain began after lifting up something heavy  Prior to Admission Medications   Prescriptions Last Dose Informant Patient Reported? Taking? HYDROcodone-acetaminophen (NORCO) 5-325 mg per tablet   No No   Sig: Take 1 tablet by mouth every 6 (six) hours as needed (Not relieved by Anti-inflammatory) for up to 12 dosesMax Daily Amount: 4 tablets   aspirin (ECOTRIN LOW STRENGTH) 81 mg EC tablet   Yes No   Sig: Take 81 mg by mouth daily   diazepam (VALIUM) 10 mg tablet   No No   Sig: Take on every 8 hours for three days as needed   ibuprofen (MOTRIN) 600 mg tablet   No No   Sig: Take 1 tablet (600 mg total) by mouth every 6 (six) hours as needed for mild pain for up to 3 days   naproxen (NAPROSYN) 500 mg tablet   No No   Sig: Take 1 tablet (500 mg total) by mouth 2 (two) times a day with meals for 20 doses   Patient not taking: Reported on 11/6/2019      Facility-Administered Medications: None       Past Medical History:   Diagnosis Date    Unspecified atrial fibrillation ([address] )     Resolved 6/29/2016        Past Surgical History:   Procedure Laterality Date    CARDIAC SURGERY      a-fib 2013       Family History   Problem Relation Age of Onset    Diabetes Mother     Diabetes type II Mother     Diabetes Father     Hypertension Family         Ess hypertension      I have reviewed and agree with the history as documented      Social History     Tobacco Use    Smoking status: Never Smoker    Smokeless tobacco: Never Used   Substance Use Topics    Alcohol use: Yes     Frequency: 2-4 times a month    Drug use: No        Review of Systems   Constitutional: Negative for fever  Respiratory: Negative for shortness of breath  Cardiovascular: Positive for chest pain  Neurological: Negative for dizziness and light-headedness  All other systems reviewed and are negative  Physical Exam  Physical Exam   Constitutional: He is oriented to person, place, and time  He appears well-developed and well-nourished  HENT:   Head: Normocephalic and atraumatic  Eyes: Pupils are equal, round, and reactive to light  EOM are normal    Neck: Normal range of motion  Cardiovascular: Normal rate, regular rhythm and normal pulses  Pulmonary/Chest: Effort normal and breath sounds normal    Abdominal: Soft  Bowel sounds are normal    Musculoskeletal:        Right lower leg: Normal         Left lower leg: Normal    Neurological: He is alert and oriented to person, place, and time  Skin: Skin is warm  Capillary refill takes less than 2 seconds  Psychiatric: He has a normal mood and affect  His behavior is normal    Vitals reviewed        Vital Signs  ED Triage Vitals [01/28/20 2305]   Temperature Pulse Respirations Blood Pressure SpO2   98 3 °F (36 8 °C) 63 20 138/89 97 %      Temp Source Heart Rate Source Patient Position - Orthostatic VS BP Location FiO2 (%)   Oral Monitor Lying Right arm --      Pain Score       3           Vitals:    01/28/20 2305   BP: 138/89   Pulse: 63   Patient Position - Orthostatic VS: Lying         Visual Acuity      ED Medications  Medications - No data to display    Diagnostic Studies  Results Reviewed     Procedure Component Value Units Date/Time    Troponin I [[id number]]  (Normal) Collected:  01/28/20 2319    Lab Status:  Final result Specimen:  Blood from Arm, Right Updated:  01/28/20 2352     Troponin I <0 02 ng/mL     Comprehensive metabolic panel [[id number]] Collected:  01/28/20 2319    Lab Status:  Final result Specimen:  Blood from Arm, Right Updated:  01/28/20 2350     Sodium 139 mmol/L      Potassium 4 2 mmol/L      Chloride 105 mmol/L      CO2 29 mmol/L      ANION GAP 5 mmol/L      BUN 15 mg/dL      Creatinine 0 97 mg/dL      Glucose 94 mg/dL      Calcium 8 7 mg/dL      AST 23 U/L      ALT 35 U/L      Alkaline Phosphatase 68 U/L      Total Protein 7 4 g/dL      Albumin 3 8 g/dL      Total Bilirubin 0 50 mg/dL      eGFR 103 ml/min/1 73sq m     Narrative:       National Kidney Disease Foundation guidelines for Chronic Kidney Disease (CKD):     Stage 1 with normal or high GFR (GFR > 90 mL/min/1 73 square meters)    Stage 2 Mild CKD (GFR = 60-89 mL/min/1 73 square meters)    Stage 3A Moderate CKD (GFR = 45-59 mL/min/1 73 square meters)    Stage 3B Moderate CKD (GFR = 30-44 mL/min/1 73 square meters)    Stage 4 Severe CKD (GFR = 15-29 mL/min/1 73 square meters)    Stage 5 End Stage CKD (GFR <15 mL/min/1 73 square meters)  Note: GFR calculation is accurate only with a steady state creatinine    CBC and differential [[id number]]  (Abnormal) Collected:  01/28/20 2319    Lab Status:  Final result Specimen:  Blood from Arm, Right Updated:  01/28/20 2326     WBC 4 50 Thousand/uL      RBC 4 83 Million/uL      Hemoglobin 14 0 g/dL      Hematocrit 41 4 %      MCV 86 fL      MCH 29 0 pg      MCHC 33 8 g/dL      RDW 12 2 %      MPV 12 2 fL      Platelets 179 Thousands/uL      nRBC 0 /100 WBCs      Neutrophils Relative 35 %      Immat GRANS % 0 %      Lymphocytes Relative 43 %      Monocytes Relative 9 %      Eosinophils Relative 12 %      Basophils Relative 1 %      Neutrophils Absolute 1 56 Thousands/µL      Immature Grans Absolute 0 01 Thousand/uL      Lymphocytes Absolute 1 91 Thousands/µL      Monocytes Absolute 0 42 Thousand/µL      Eosinophils Absolute 0 56 Thousand/µL      Basophils Absolute 0 04 Thousands/µL                  XR chest pa & lateral   Final Result by Bianca Ervin MD (01/29 0013)      No acute cardiopulmonary disease  Workstation performed: [id number]                    Procedures  ECG 12 Lead Documentation Only  Date/Time: 1/29/2020 1:45 AM  Performed by: Wellington Moreno PA-C  Authorized by: Wellington Moreno PA-C     Indications / Diagnosis:  Chest pain  ECG reviewed by me, the ED Provider: yes    Patient location:  ED  Previous ECG:     Previous ECG:  Unavailable  Interpretation:     Interpretation: normal    Rate:     ECG rate:  63    ECG rate assessment: normal    Rhythm:     Rhythm: sinus rhythm               ED Course         HEART Risk Score      Most Recent Value   History  0 Filed at: 01/29/2020 0003   ECG  0 Filed at: 01/29/2020 0003   Age  1 Filed at: 01/29/2020 0003   Risk Factors  0 Filed at: 01/29/2020 0003   Troponin  0 Filed at: 01/29/2020 0003   Heart Score Risk Calculator   History  0 Filed at: 01/29/2020 0003   ECG  0 Filed at: 01/29/2020 0003   Age  1 Filed at: 01/29/2020 0003   Risk Factors  0 Filed at: 01/29/2020 0003   Troponin  0 Filed at: 01/29/2020 0003   HEART Score  1 Filed at: 01/29/2020 0003   HEART Score  1 Filed at: 01/29/2020 0003                            MDM  Number of Diagnoses or Management Options  Chest pain:   Diagnosis management comments:   Patient is a 78-year-old male presents emergency department complaints of chest pain that is nonradiating  Lab evaluation was performed does not show any abnormality  There is no evidence of ACS this time  EKG was unremarkable  Chest x-ray was unremarkable  Findings were discussed with patient  Recommend follow-up with cardiology for further evaluation  Return parameters were discussed  Patient stable for discharge  Patient is low-risk for ACS by the HEART score and has approx 1 7-2 5% risk of MACE in the next 30d given the negative EKG and initial cardiac troponin  I discussed with the patient the option of obtaining a repeat cardiac troponin and EKG at the t+3 hr time point  The patient declined stating that he/she was comfortable with the risk of 30d MACE as described  Patient was advised to follow with PMD as soon as possible for further evaluation for additional investigation; ED return precautions regarding chest pain symptoms concerning for ACS were discussed (e g , dyspnea, exertional pain, radiation of pain to shoulders, diaphoresis, vomiting)  Patient expressed understanding and agreed to plan  Six, POLINA WHITFIELD, DANNY Diaz, and NAHID Aldridge  2008  Chest pain in the emergency room: value of the HEART score  [place] heart journal?: monthly journal of the [place] of Cardiology and the [employer], no  6      DANNY Coyle, POLINA Arevalo, NAHID Aldridge, M NARENDRA Carrasco, POLINA Ramon R ANIA Flores, et al  2013  A prospective validation of the HEART score for chest pain patients at the emergency department  International journal of cardiology, no  3 (March 7)  doi:10 1016/j ijcard  2013 01 255  Amount and/or Complexity of Data Reviewed  Clinical lab tests: ordered and reviewed  Tests in the radiology section of CPT®: ordered and reviewed  Independent visualization of images, tracings, or specimens: yes    Risk of Complications, Morbidity, and/or Mortality  Presenting problems: moderate  Diagnostic procedures: moderate  Management options: moderate    Patient Progress  Patient progress: stable        Disposition  Final diagnoses:   Chest pain     Time reflects when diagnosis was documented in both MDM as applicable and the Disposition within this note     Time User Action Codes Description Comment    1/29/2020 12:03 AM Jodi Del Rio Add [R07 9] Chest pain       ED Disposition     ED Disposition Condition Date/Time Comment    Discharge Good Wed Jan 29, 2020 Jorge Hayes discharge to home/self care              Follow-up Information     Follow up With Specialties Details Why Contact Info Additional [facility] [address]  Cardiology Schedule an appointment as soon as possible for a visit   [address]  [facility] [place]  [phone]  [facility] [address] [facility], [address], [address] [facility] [place], [place], [address]           Discharge Medication List as of 1/29/2020 12:03 AM      CONTINUE these medications which have NOT CHANGED    Details   aspirin (ECOTRIN LOW STRENGTH) 81 mg EC tablet Take 81 mg by mouth daily, Historical Med      diazepam (VALIUM) 10 mg tablet Take on every 8 hours for three days as needed, Normal      HYDROcodone-acetaminophen (NORCO) 5-325 mg per tablet Take 1 tablet by mouth every 6 (six) hours as needed (Not relieved by Anti-inflammatory) for up to 12 dosesMax Daily Amount: 4 tablets, Starting Tue 11/5/2019, Normal      ibuprofen (MOTRIN) 600 mg tablet Take 1 tablet (600 mg total) by mouth every 6 (six) hours as needed for mild pain for up to 3 days, Starting Fri 12/21/2018, Until Mon 12/24/2018, Normal      naproxen (NAPROSYN) 500 mg tablet Take 1 tablet (500 mg total) by mouth 2 (two) times a day with meals for 20 doses, Starting Tue 11/5/2019, Until Fri 11/15/2019, Normal           No discharge procedures on file      ED Provider  Electronically Signed by           Vee Parra PA-C  01/29/20 7548

## 2021-05-20 ENCOUNTER — OFFICE VISIT (OUTPATIENT)
Dept: INTERNAL MEDICINE CLINIC | Facility: CLINIC | Age: 56
End: 2021-05-20
Payer: COMMERCIAL

## 2021-05-20 VITALS
DIASTOLIC BLOOD PRESSURE: 88 MMHG | WEIGHT: 210.2 LBS | TEMPERATURE: 98.3 F | HEART RATE: 70 BPM | OXYGEN SATURATION: 98 % | SYSTOLIC BLOOD PRESSURE: 120 MMHG | HEIGHT: 71 IN | BODY MASS INDEX: 29.43 KG/M2

## 2021-05-20 DIAGNOSIS — Z00.00 ANNUAL PHYSICAL EXAM: Primary | ICD-10-CM

## 2021-05-20 DIAGNOSIS — L98.9 SKIN LESION OF BACK: ICD-10-CM

## 2021-05-20 DIAGNOSIS — Z12.11 COLON CANCER SCREENING: ICD-10-CM

## 2021-05-20 DIAGNOSIS — R39.11 URINARY HESITANCY: ICD-10-CM

## 2021-05-20 PROCEDURE — 3725F SCREEN DEPRESSION PERFORMED: CPT | Performed by: FAMILY MEDICINE

## 2021-05-20 PROCEDURE — 3008F BODY MASS INDEX DOCD: CPT | Performed by: FAMILY MEDICINE

## 2021-05-20 PROCEDURE — 1036F TOBACCO NON-USER: CPT | Performed by: FAMILY MEDICINE

## 2021-05-20 PROCEDURE — 99386 PREV VISIT NEW AGE 40-64: CPT | Performed by: FAMILY MEDICINE

## 2021-05-20 NOTE — PATIENT INSTRUCTIONS
Sleep hygiene:   Avoidance of caffeine sources is strongly encouraged  Sleep hygiene issues are reviewed  Try over the counter Unisom or Melatonin as a sleep aid  Get your fasting blood work completed    SCHEDULE YOUR COLONOSCOPY AS SOON AS POSSIBLE  MAKE SURE TO FOLLOW UP WITH DERMATOLOGY ABOUT THE CYST ON YOUR BACK  Wellness Visit for Adults   AMBULATORY CARE:   A wellness visit  is when you see your healthcare provider to get screened for health problems  Your healthcare provider will also give you advice on how to stay healthy  Write down your questions so you remember to ask them  Ask your healthcare provider how often you should have a wellness visit  What happens at a wellness visit:  Your healthcare provider will ask about your health, and your family history of health problems  This includes high blood pressure, heart disease, and cancer  He or she will ask if you have symptoms that concern you, if you smoke, and about your mood  You may also be asked about your intake of medicines, supplements, food, and alcohol  Any of the following may be done:  · Your weight  will be checked  Your height may also be checked so your body mass index (BMI) can be calculated  Your BMI shows if you are at a healthy weight  · Your blood pressure  and heart rate will be checked  Your temperature may also be checked  · Blood and urine tests  may be done  Blood tests may be done to check your cholesterol levels  Abnormal cholesterol levels increase your risk for heart disease and stroke  You may also need a blood or urine test to check for diabetes if you are at increased risk  Urine tests may be done to look for signs of an infection or kidney disease  · A physical exam  includes checking your heartbeat and lungs with a stethoscope  Your healthcare provider may also check your skin to look for sun damage  · Screening tests  may be recommended   A screening test is done to check for diseases that may not cause symptoms  The screening tests you may need depend on your age, gender, family history, and lifestyle habits  For example, colorectal screening may be recommended if you are 48years old or older  Screening tests you need if you are a woman:   · A Pap smear  is used to screen for cervical cancer  Pap smears are usually done every 3 to 5 years depending on your age  You may need them more often if you have had abnormal Pap smear test results in the past  Ask your healthcare provider how often you should have a Pap smear  · A mammogram  is an x-ray of your breasts to screen for breast cancer  Experts recommend mammograms every 2 years starting at age 48 years  You may need a mammogram at age 52 years or younger if you have an increased risk for breast cancer  Talk to your healthcare provider about when you should start having mammograms and how often you need them  Vaccines you may need:   · Get an influenza vaccine  every year  The influenza vaccine protects you from the flu  Several types of viruses cause the flu  The viruses change over time, so new vaccines are made each year  · Get a tetanus-diphtheria (Td) booster vaccine  every 10 years  This vaccine protects you against tetanus and diphtheria  Tetanus is a severe infection that may cause painful muscle spasms and lockjaw  Diphtheria is a severe bacterial infection that causes a thick covering in the back of your mouth and throat  · Get a human papillomavirus (HPV) vaccine  if you are female and aged 23 to 32 or male 23 to 24 and never received it  This vaccine protects you from HPV infection  HPV is the most common infection spread by sexual contact  HPV may also cause vaginal, penile, and anal cancers  · Get a pneumococcal vaccine  if you are aged 72 years or older  The pneumococcal vaccine is an injection given to protect you from pneumococcal disease  Pneumococcal disease is an infection caused by pneumococcal bacteria   The infection may cause pneumonia, meningitis, or an ear infection  · Get a shingles vaccine  if you are 60 or older, even if you have had shingles before  The shingles vaccine is an injection to protect you from the varicella-zoster virus  This is the same virus that causes chickenpox  Shingles is a painful rash that develops in people who had chickenpox or have been exposed to the virus  How to eat healthy:  My Plate is a model for planning healthy meals  It shows the types and amounts of foods that should go on your plate  Fruits and vegetables make up about half of your plate, and grains and protein make up the other half  A serving of dairy is included on the side of your plate  The amount of calories and serving sizes you need depends on your age, gender, weight, and height  Examples of healthy foods are listed below:  · Eat a variety of vegetables  such as dark green, red, and orange vegetables  You can also include canned vegetables low in sodium (salt) and frozen vegetables without added butter or sauces  · Eat a variety of fresh fruits , canned fruit in 100% juice, frozen fruit, and dried fruit  · Include whole grains  At least half of the grains you eat should be whole grains  Examples include whole-wheat bread, wheat pasta, brown rice, and whole-grain cereals such as oatmeal     · Eat a variety of protein foods such as seafood (fish and shellfish), lean meat, and poultry without skin (turkey and chicken)  Examples of lean meats include pork leg, shoulder, or tenderloin, and beef round, sirloin, tenderloin, and extra lean ground beef  Other protein foods include eggs and egg substitutes, beans, peas, soy products, nuts, and seeds  · Choose low-fat dairy products such as skim or 1% milk or low-fat yogurt, cheese, and cottage cheese  · Limit unhealthy fats  such as butter, hard margarine, and shortening  Exercise:  Exercise at least 30 minutes per day on most days of the week   Some examples of exercise include walking, biking, dancing, and swimming  You can also fit in more physical activity by taking the stairs instead of the elevator or parking farther away from stores  Include muscle strengthening activities 2 days each week  Regular exercise provides many health benefits  It helps you manage your weight, and decreases your risk for type 2 diabetes, heart disease, stroke, and high blood pressure  Exercise can also help improve your mood  Ask your healthcare provider about the best exercise plan for you  General health and safety guidelines:   · Do not smoke  Nicotine and other chemicals in cigarettes and cigars can cause lung damage  Ask your healthcare provider for information if you currently smoke and need help to quit  E-cigarettes or smokeless tobacco still contain nicotine  Talk to your healthcare provider before you use these products  · Limit alcohol  A drink of alcohol is 12 ounces of beer, 5 ounces of wine, or 1½ ounces of liquor  · Lose weight, if needed  Being overweight increases your risk of certain health conditions  These include heart disease, high blood pressure, type 2 diabetes, and certain types of cancer  · Protect your skin  Do not sunbathe or use tanning beds  Use sunscreen with a SPF 15 or higher  Apply sunscreen at least 15 minutes before you go outside  Reapply sunscreen every 2 hours  Wear protective clothing, hats, and sunglasses when you are outside  · Drive safely  Always wear your seatbelt  Make sure everyone in your car wears a seatbelt  A seatbelt can save your life if you are in an accident  Do not use your cell phone when you are driving  This could distract you and cause an accident  Pull over if you need to make a call or send a text message  · Practice safe sex  Use latex condoms if are sexually active and have more than one partner   Your healthcare provider may recommend screening tests for sexually transmitted infections (STIs)  · Wear helmets, lifejackets, and protective gear  Always wear a helmet when you ride a bike or motorcycle, go skiing, or play sports that could cause a head injury  Wear protective equipment when you play sports  Wear a lifejacket when you are on a boat or doing water sports  © Copyright [address] Information is for End User's use only and may not be sold, redistributed or otherwise used for commercial purposes  All illustrations and images included in CareNotes® are the copyrighted property of [employer]  or [facility] Leela Nagy   The above information is an  only  It is not intended as medical advice for individual conditions or treatments  Talk to your doctor, nurse or pharmacist before following any medical regimen to see if it is safe and effective for you

## 2021-05-20 NOTE — PROGRESS NOTES
ADULT ANNUAL [address]     NAME: Guzman Bains  AGE: 48 y o  SEX: male  : 1965     DATE: 2021     Assessment and Plan:     Problem List Items Addressed This Visit     None      Visit Diagnoses     Annual physical exam    -  Primary    Relevant Orders    Comprehensive metabolic panel (Completed)    Lipid Panel with Direct LDL reflex (Completed)    Colon cancer screening        Relevant Orders    Ambulatory referral for colonoscopy    Skin lesion of back        Relevant Orders    Ambulatory referral to Dermatology    Urinary hesitancy        Relevant Orders    PSA, Total Screen (Completed)      PLAN: well adult  screening labs ordered  Screening studies ordered  Referral placed for removal of cyst      Immunizations and preventive care screenings were discussed with patient today  Appropriate education was printed on patient's after visit summary  Counseling:  Dental Health: discussed importance of regular tooth brushing, flossing, and dental visits  · Exercise: the importance of regular exercise/physical activity was discussed  Recommend exercise 3-5 times per week for at least 30 minutes  BMI Counseling: Body mass index is 29 32 kg/m²  The BMI is above normal  Nutrition recommendations include decreasing portion sizes, encouraging healthy choices of fruits and vegetables, limiting drinks that contain sugar and reducing intake of cholesterol  Exercise recommendations include exercising 3-5 times per week  Return in about 1 year (around 2022) for Annual physical      Chief Complaint:     Chief Complaint   Patient presents with    [facility]      History of Present Illness:     Adult Annual Physical   Patient here for a comprehensive physical exam  The patient reports no concerns  Previously [place] by [place] in [place]  Hasn't seen provider in over a year  Denies chronic illness    Denies personal hx of mi OR CVA  Works in [place]  Never had a colonoscopy       Diet and Physical Activity  · Diet/Nutrition: limited junk food and consuming 3-5 servings of fruits/vegetables daily  · Exercise: walking  Depression Screening  PHQ-9 Depression Screening    PHQ-9:   Frequency of the following problems over the past two weeks:      Little interest or pleasure in doing things: 0 - not at all  Feeling down, depressed, or hopeless: 0 - not at all  PHQ-2 Score: 0       General Health  · Sleep: gets 4-6 hours of sleep on average  · Hearing: normal - bilateral   · Vision: goes for regular eye exams and wears glasses  · Dental: regular dental visits  [facility]  · Symptoms include: urinary hesitancy     Review of Systems:     Review of Systems   Constitutional: Negative for activity change, appetite change and fatigue  HENT: Negative for congestion and rhinorrhea  Eyes: Negative for visual disturbance  Respiratory: Positive for cough (infrequent; dry )  Negative for shortness of breath  Cardiovascular: Negative for chest pain and leg swelling  Gastrointestinal: Negative for constipation and diarrhea  Genitourinary: Negative for dysuria and hematuria  Musculoskeletal: Negative for arthralgias, back pain and gait problem  Skin:        Lesion on back    Allergic/Immunologic: Negative for environmental allergies  Neurological: Negative for dizziness and headaches        Past Medical History:     Past Medical History:   Diagnosis Date    Unspecified atrial fibrillation ([facility] [address] )     Resolved 6/29/2016       Past Surgical History:     Past Surgical History:   Procedure Laterality Date    CARDIAC SURGERY      a-fib 2013      Family History:     Family History   Problem Relation Age of Onset    Diabetes Mother     Diabetes type II Mother     Diabetes Father     Hypertension Family         Ess hypertension       Social History:        Social History     Socioeconomic History    Marital status: /Civil [employer]     Spouse name: None    Number of children: None    Years of education: None    Highest education level: None   Occupational History    None   Social Needs    Financial resource strain: None    Food insecurity     Worry: None     Inability: None    Transportation needs     Medical: None     Non-medical: None   Tobacco Use    Smoking status: Never Smoker    Smokeless tobacco: Never Used   Substance and Sexual Activity    Alcohol use:  Yes     Alcohol/week: 2 0 standard drinks     Types: 2 Glasses of wine per week     Frequency: 2-4 times a month     Drinks per session: 1 or 2     Binge frequency: Never     Comment: ocassionally     Drug use: No    Sexual activity: None   Lifestyle    Physical activity     Days per week: 0 days     Minutes per session: 0 min    Stress: Not at all   Relationships    Social connections     Talks on phone: None     Gets together: None     Attends Voodoo service: None     Active member of club or organization: None     Attends meetings of clubs or organizations: None     Relationship status: None    Intimate partner violence     Fear of current or ex partner: None     Emotionally abused: None     Physically abused: None     Forced sexual activity: None   Other Topics Concern    None   Social History Narrative    None      Current Medications:     Current Outpatient Medications   Medication Sig Dispense Refill    aspirin (ECOTRIN LOW STRENGTH) 81 mg EC tablet Take 81 mg by mouth daily      diazepam (VALIUM) 10 mg tablet Take on every 8 hours for three days as needed (Patient not taking: Reported on 5/20/2021) 12 tablet 0    HYDROcodone-acetaminophen (NORCO) 5-325 mg per tablet Take 1 tablet by mouth every 6 (six) hours as needed (Not relieved by Anti-inflammatory) for up to 12 dosesMax Daily Amount: 4 tablets (Patient not taking: Reported on 5/20/2021) 12 tablet 0    ibuprofen (MOTRIN) 600 mg tablet Take 1 tablet (600 mg total) by mouth every 6 (six) hours as needed for mild pain for up to 3 days (Patient not taking: Reported on 5/20/2021) 30 tablet 0    naproxen (NAPROSYN) 500 mg tablet Take 1 tablet (500 mg total) by mouth 2 (two) times a day with meals for 20 doses (Patient not taking: Reported on 11/6/2019) 20 tablet 0     No current facility-administered medications for this visit  Allergies:     No Known Allergies   Physical Exam:     /88 (BP Location: Left arm, Patient Position: Sitting, Cuff Size: Standard) Comment: NO NSAIDS  Pulse 70   Temp 98 3 °F (36 8 °C) (Temporal) Comment: NO NSAIDS  Ht 5' 11" (1 803 m)   Wt 95 3 kg (210 lb 3 2 oz)   SpO2 98%   BMI 29 32 kg/m²     Physical Exam  Constitutional:       Appearance: Normal appearance  HENT:      Head: Normocephalic and atraumatic  Right Ear: Tympanic membrane and external ear normal       Left Ear: Tympanic membrane and external ear normal       Mouth/Throat:      Pharynx: Oropharynx is clear  Eyes:      Conjunctiva/sclera: Conjunctivae normal       Pupils: Pupils are equal, round, and reactive to light  Neck:      Musculoskeletal: Neck supple  Cardiovascular:      Rate and Rhythm: Normal rate and regular rhythm  Heart sounds: No murmur  Pulmonary:      Effort: Pulmonary effort is normal       Breath sounds: Normal breath sounds  Abdominal:      General: Bowel sounds are normal       Palpations: Abdomen is soft  Musculoskeletal:      Right lower leg: No edema  Left lower leg: No edema  Skin:     Findings: Lesion (epidermoid cyst, soft, compressible on left paraspinal area at level of T5 ) present  Neurological:      Mental Status: He is alert and oriented to person, place, and time        Gait: Gait normal       Deep Tendon Reflexes: Reflexes normal    Psychiatric:         Mood and Affect: Mood normal          Behavior: Behavior normal             Jesu Carter, DO  MEDICAL [id number]

## 2021-05-22 ENCOUNTER — APPOINTMENT (OUTPATIENT)
Dept: LAB | Facility: CLINIC | Age: 56
End: 2021-05-22
Payer: COMMERCIAL

## 2021-05-22 DIAGNOSIS — R39.11 URINARY HESITANCY: ICD-10-CM

## 2021-05-22 DIAGNOSIS — Z00.00 ANNUAL PHYSICAL EXAM: ICD-10-CM

## 2021-05-22 LAB
ALBUMIN SERPL BCP-MCNC: 3.8 G/DL (ref 3.5–5)
ALP SERPL-CCNC: 61 U/L (ref 46–116)
ALT SERPL W P-5'-P-CCNC: 64 U/L (ref 12–78)
ANION GAP SERPL CALCULATED.3IONS-SCNC: 2 MMOL/L (ref 4–13)
AST SERPL W P-5'-P-CCNC: 32 U/L (ref 5–45)
BILIRUB SERPL-MCNC: 0.55 MG/DL (ref 0.2–1)
BUN SERPL-MCNC: 16 MG/DL (ref 5–25)
CALCIUM SERPL-MCNC: 9.3 MG/DL (ref 8.3–10.1)
CHLORIDE SERPL-SCNC: 110 MMOL/L (ref 100–108)
CHOLEST SERPL-MCNC: 167 MG/DL (ref 50–200)
CO2 SERPL-SCNC: 28 MMOL/L (ref 21–32)
CREAT SERPL-MCNC: 0.94 MG/DL (ref 0.6–1.3)
GFR SERPL CREATININE-BSD FRML MDRD: 106 ML/MIN/1.73SQ M
GLUCOSE P FAST SERPL-MCNC: 103 MG/DL (ref 65–99)
HDLC SERPL-MCNC: 52 MG/DL
LDLC SERPL CALC-MCNC: 104 MG/DL (ref 0–100)
POTASSIUM SERPL-SCNC: 4.8 MMOL/L (ref 3.5–5.3)
PROT SERPL-MCNC: 7.7 G/DL (ref 6.4–8.2)
PSA SERPL-MCNC: 1.5 NG/ML (ref 0–4)
SODIUM SERPL-SCNC: 140 MMOL/L (ref 136–145)
TRIGL SERPL-MCNC: 57 MG/DL

## 2021-05-22 PROCEDURE — 36415 COLL VENOUS BLD VENIPUNCTURE: CPT

## 2021-05-22 PROCEDURE — 80053 COMPREHEN METABOLIC PANEL: CPT

## 2021-05-22 PROCEDURE — G0103 PSA SCREENING: HCPCS

## 2021-05-22 PROCEDURE — 80061 LIPID PANEL: CPT

## 2021-05-25 ENCOUNTER — TELEPHONE (OUTPATIENT)
Dept: INTERNAL MEDICINE CLINIC | Facility: CLINIC | Age: 56
End: 2021-05-25

## 2021-05-25 DIAGNOSIS — Z11.52 ENCOUNTER FOR SCREENING FOR COVID-19: Primary | ICD-10-CM

## 2021-05-25 NOTE — TELEPHONE ENCOUNTER
----- Message from Gabbie Arias DO sent at 5/25/2021  1:06 PM EDT -----  Regarding: results  Overall blood work ok  Prostate hormone at a normal level  Kidney and liver looks fine  Blood sugar slightly elevated  This can be monitored with blood work for now  No medication indicated at this time  Cholesterol is good

## 2021-06-27 PROCEDURE — 87635 SARS-COV-2 COVID-19 AMP PRB: CPT | Performed by: FAMILY MEDICINE

## 2021-06-28 ENCOUNTER — TELEPHONE (OUTPATIENT)
Dept: INTERNAL MEDICINE CLINIC | Facility: CLINIC | Age: 56
End: 2021-06-28

## 2021-06-28 NOTE — TELEPHONE ENCOUNTER
----- Message from Alberto Mina DO sent at 6/28/2021  3:21 PM EDT -----  Please notify pt of negative results

## 2021-06-29 ENCOUNTER — TELEPHONE (OUTPATIENT)
Dept: GASTROENTEROLOGY | Facility: CLINIC | Age: 56
End: 2021-06-29

## 2021-06-29 ENCOUNTER — PREP FOR PROCEDURE (OUTPATIENT)
Dept: GASTROENTEROLOGY | Facility: CLINIC | Age: 56
End: 2021-06-29

## 2021-06-29 DIAGNOSIS — Z12.11 SCREENING FOR COLON CANCER: Primary | ICD-10-CM

## 2021-08-17 ENCOUNTER — OFFICE VISIT (OUTPATIENT)
Dept: UROLOGY | Facility: CLINIC | Age: 56
End: 2021-08-17
Payer: COMMERCIAL

## 2021-08-17 VITALS
BODY MASS INDEX: 29.01 KG/M2 | DIASTOLIC BLOOD PRESSURE: 84 MMHG | HEIGHT: 71 IN | WEIGHT: 207.2 LBS | HEART RATE: 84 BPM | SYSTOLIC BLOOD PRESSURE: 118 MMHG

## 2021-08-17 DIAGNOSIS — N52.8 OTHER MALE ERECTILE DYSFUNCTION: ICD-10-CM

## 2021-08-17 DIAGNOSIS — R39.11 URINARY HESITANCY: Primary | ICD-10-CM

## 2021-08-17 LAB
POST-VOID RESIDUAL VOLUME, ML POC: 0 ML
SL AMB  POCT GLUCOSE, UA: NORMAL
SL AMB LEUKOCYTE ESTERASE,UA: NORMAL
SL AMB POCT BILIRUBIN,UA: NORMAL
SL AMB POCT BLOOD,UA: NORMAL
SL AMB POCT CLARITY,UA: CLEAR
SL AMB POCT COLOR,UA: YELLOW
SL AMB POCT KETONES,UA: NORMAL
SL AMB POCT NITRITE,UA: NORMAL
SL AMB POCT PH,UA: 6.5
SL AMB POCT SPECIFIC GRAVITY,UA: 1.03
SL AMB POCT URINE PROTEIN: NORMAL
SL AMB POCT UROBILINOGEN: 0.2

## 2021-08-17 PROCEDURE — 1036F TOBACCO NON-USER: CPT | Performed by: PHYSICIAN ASSISTANT

## 2021-08-17 PROCEDURE — 3008F BODY MASS INDEX DOCD: CPT | Performed by: PHYSICIAN ASSISTANT

## 2021-08-17 PROCEDURE — 51798 US URINE CAPACITY MEASURE: CPT | Performed by: PHYSICIAN ASSISTANT

## 2021-08-17 PROCEDURE — 81002 URINALYSIS NONAUTO W/O SCOPE: CPT | Performed by: PHYSICIAN ASSISTANT

## 2021-08-17 PROCEDURE — 99203 OFFICE O/P NEW LOW 30 MIN: CPT | Performed by: PHYSICIAN ASSISTANT

## 2021-08-17 RX ORDER — SILDENAFIL 100 MG/1
100 TABLET, FILM COATED ORAL AS NEEDED
Qty: 30 TABLET | Refills: 2 | Status: SHIPPED | OUTPATIENT
Start: 2021-08-17 | End: 2021-12-03 | Stop reason: SDUPTHER

## 2021-08-17 NOTE — PROGRESS NOTES
8/17/2021      Chief Complaint   Patient presents with    Erectile Dysfunction     Assessment and Plan    55 y o  male new patient    1  ED  - Currently managed on sildenafil 100 mg p r n  prior to intercourse  He feels this is overall moderately effective  Rx refilled  - Additionally discussed a vacuum assist penile pump and flexible penile ring to both help maintain and obtain erections  - Advised patient to obtain records from previous urologist in [place] and [address] [place] [address]  - He previously was managed on intracavernosal injections however states he was never taught how to perform the injection therapy and therefore did not continue  He would like to remain on oral PDE5 inhibitor at this time and may consider restarting injection therapy in the future  2  Prostate cancer screening  - PSA from 5/22/21 was 1 5  Previously was 0 9 in 2016  - SHILPA unremarkable  History of Present Illness  Heidicallie Zafar is a 55 y o  male here for new patient evaluation of erectile dysfunction  He reports this has been ongoing for several years  He reports issues with both maintaining and obtaining erections  He is currently managed on sildenafil 100 mg as needed prior to intercourse which has been moderately effective  He reports he additionally was prescribed intracavernosal injections however never pursued treatment due to not learning how to perform the injections  States he previously saw in independent provider who performed an unknown diagnostic test on his penis and told him he had "dead tissue" in his penis  I do not have any records on this  Patient has questions regarding shockwave therapy for erectile dysfunction  Overall he states he is able to achieve adequate erections with sildenafil  He denies any symptoms such as fatigue or low libido  He denies any prior  surgical manipulation or prior urologic history  Denies any urinary symptoms, pain with erections, penile curvature, or dysorgasmia    He denies any family history of  malignancy  No significant medical history  PVR=0 mL  Urine dip negative for blood, leukocytes, or nitrites  AUA SYMPTOM SCORE      Most Recent Value   AUA SYMPTOM SCORE   How often have you had a sensation of not emptying your bladder completely after you finished urinating? 0   How often have you had to urinate again less than two hours after you finished urinating? 0   How often have you found you stopped and started again several times when you urinate?  0   How often have you found it difficult to postpone urination? 0   How often have you had a weak urinary stream?  0   How often have you had to push or strain to begin urination? 0   How many times did you most typically get up to urinate from the time you went to bed at night until the time you got up in the morning? 1   Quality of Life: If you were to spend the rest of your life with your urinary condition just the way it is now, how would you feel about that?  0   AUA SYMPTOM SCORE  1          Review of Systems   Constitutional: Negative for chills and fever  Respiratory: Negative for shortness of breath  Cardiovascular: Negative for chest pain  Gastrointestinal: Negative for abdominal pain, constipation, diarrhea, nausea and vomiting  Genitourinary: Negative for difficulty urinating, discharge, dysuria, flank pain, frequency, hematuria, penile pain, penile swelling, scrotal swelling, testicular pain and urgency  Neurological: Negative for dizziness                    Past Medical History  Past Medical History:   Diagnosis Date    Erectile dysfunction     Unspecified atrial fibrillation ([facility] [address] )     Resolved 6/29/2016        Past Social History  Past Surgical History:   Procedure Laterality Date    CARDIAC SURGERY      a-fib 2013     Social History     Tobacco Use   Smoking Status Never Smoker   Smokeless Tobacco Never Used       Past Family History  Family History   Problem Relation Age of Onset    Diabetes Mother     Diabetes type II Mother     Hypertension Mother     Diabetes Father     Hypertension Family         Ess hypertension        Past Social history  Social History     Socioeconomic History    Marital status: /Civil Union     Spouse name: Not on file    Number of children: Not on file    Years of education: Not on file    Highest education level: Not on file   Occupational History    Not on file   Tobacco Use    Smoking status: Never Smoker    Smokeless tobacco: Never Used   Vaping Use    Vaping Use: Never used   Substance and Sexual Activity    Alcohol use: Yes     Alcohol/week: 2 0 standard drinks     Types: 2 Glasses of wine per week     Comment: ocassionally     Drug use: No    Sexual activity: Not on file   Other Topics Concern    Not on file   Social History Narrative    Not on file     Social Determinants of Health     Financial Resource Strain:     Difficulty of Paying Living Expenses:    Food Insecurity:     Worried About Running Out of Food in the Last Year:     [address] in the Last Year:    Transportation Needs:     Lack of Transportation (Medical):      Lack of Transportation (Non-Medical):    Physical Activity: Inactive    Days of Exercise per Week: 0 days    Minutes of Exercise per Session: 0 min   Stress: No Stress Concern Present    Feeling of Stress : Not at all   Social Connections:     Frequency of Communication with Friends and Family:     Frequency of Social Gatherings with Friends and Family:     Attends Yarsani Services:     Active Member of Clubs or Organizations:     Attends Club or Organization Meetings:     Marital Status:    Intimate Partner Violence:     Fear of Current or Ex-Partner:     Emotionally Abused:     Physically Abused:     Sexually Abused:        Current Medications  Current Outpatient Medications   Medication Sig Dispense Refill    aspirin (ECOTRIN LOW STRENGTH) 81 mg EC tablet Take 81 mg by mouth daily      diazepam (VALIUM) 10 mg tablet Take on every 8 hours for three days as needed (Patient not taking: Reported on 5/20/2021) 12 tablet 0    HYDROcodone-acetaminophen (NORCO) 5-325 mg per tablet Take 1 tablet by mouth every 6 (six) hours as needed (Not relieved by Anti-inflammatory) for up to 12 dosesMax Daily Amount: 4 tablets (Patient not taking: Reported on 5/20/2021) 12 tablet 0    ibuprofen (MOTRIN) 600 mg tablet Take 1 tablet (600 mg total) by mouth every 6 (six) hours as needed for mild pain for up to 3 days (Patient not taking: Reported on 5/20/2021) 30 tablet 0    naproxen (NAPROSYN) 500 mg tablet Take 1 tablet (500 mg total) by mouth 2 (two) times a day with meals for 20 doses (Patient not taking: Reported on 11/6/2019) 20 tablet 0    sildenafil (VIAGRA) 100 mg tablet Take 1 tablet (100 mg total) by mouth as needed for erectile dysfunction 30 tablet 2     No current facility-administered medications for this visit  Allergies  No Known Allergies      The following portions of the patient's history were reviewed and updated as appropriate: allergies, current medications, past medical history, past social history, past surgical history and problem list       Vitals  Vitals:    08/17/21 1533   BP: 118/84   Pulse: 84   Weight: 94 kg (207 lb 3 2 oz)   Height: 5' 11" (1 803 m)           Physical Exam  Physical Exam  Constitutional:       Appearance: Normal appearance  He is normal weight  HENT:      Head: Normocephalic and atraumatic  Right Ear: External ear normal       Left Ear: External ear normal    Eyes:      General: No scleral icterus  Conjunctiva/sclera: Conjunctivae normal    Cardiovascular:      Pulses: Normal pulses  Pulmonary:      Effort: Pulmonary effort is normal    Genitourinary:     Comments: Prostate approximately 25 g without nodules or tenderness  Musculoskeletal:         General: Normal range of motion  Cervical back: Normal range of motion     Skin:     General: Skin is warm and dry  Neurological:      General: No focal deficit present  Mental Status: He is alert and oriented to person, place, and time  Psychiatric:         Mood and Affect: Mood normal          Behavior: Behavior normal          Thought Content:  Thought content normal          Judgment: Judgment normal            Results  No results found for this or any previous visit (from the past 1 hour(s)) ]  Lab Results   Component Value Date    PSA 1 5 05/22/2021    PSA 0 9 07/02/2016     Lab Results   Component Value Date    CALCIUM 9 3 05/22/2021    K 4 8 05/22/2021    CO2 28 05/22/2021     (H) 05/22/2021    BUN 16 05/22/2021    CREATININE 0 94 05/22/2021     Lab Results   Component Value Date    WBC 4 50 01/28/2020    HGB 14 0 01/28/2020    HCT 41 4 01/28/2020    MCV 86 01/28/2020     01/28/2020           Orders  Orders Placed This Encounter   Procedures    POCT Measure PVR    POCT urine dip       Betty Vivas PA-C

## 2021-09-03 ENCOUNTER — TELEPHONE (OUTPATIENT)
Dept: GASTROENTEROLOGY | Facility: CLINIC | Age: 56
End: 2021-09-03

## 2021-09-03 ENCOUNTER — TELEPHONE (OUTPATIENT)
Dept: GASTROENTEROLOGY | Facility: HOSPITAL | Age: 56
End: 2021-09-03

## 2021-09-03 NOTE — TELEPHONE ENCOUNTER
Scar Patiño patient - Patient has not stopped his baby aspirin 1x a day and is scheduled for a procedure tomorrow, Saturday 9/4/21  Please RTRN call to [phone] to let them know asap so they can do the prep or not    Thx

## 2021-09-03 NOTE — TELEPHONE ENCOUNTER
Please let patient know that it is okay, and he can still have his procedure tomorrow  Just give baby aspirin today  Thank you

## 2021-09-03 NOTE — TELEPHONE ENCOUNTER
Called and spoke to patient  Let him know that as per paolo he can take baby aspirin   Patient had no further questions or concerns

## 2021-09-04 ENCOUNTER — ANESTHESIA (OUTPATIENT)
Dept: GASTROENTEROLOGY | Facility: HOSPITAL | Age: 56
End: 2021-09-04

## 2021-09-04 ENCOUNTER — ANESTHESIA EVENT (OUTPATIENT)
Dept: GASTROENTEROLOGY | Facility: HOSPITAL | Age: 56
End: 2021-09-04

## 2021-09-04 ENCOUNTER — HOSPITAL ENCOUNTER (OUTPATIENT)
Dept: GASTROENTEROLOGY | Facility: HOSPITAL | Age: 56
Setting detail: OUTPATIENT SURGERY
Discharge: HOME | End: 2021-09-04
Attending: INTERNAL MEDICINE | Admitting: INTERNAL MEDICINE
Payer: COMMERCIAL

## 2021-09-04 VITALS
TEMPERATURE: 97.4 F | HEIGHT: 72 IN | SYSTOLIC BLOOD PRESSURE: 124 MMHG | WEIGHT: 204 LBS | HEART RATE: 56 BPM | DIASTOLIC BLOOD PRESSURE: 82 MMHG | OXYGEN SATURATION: 98 % | BODY MASS INDEX: 27.63 KG/M2 | RESPIRATION RATE: 17 BRPM

## 2021-09-04 DIAGNOSIS — Z12.11 SCREENING FOR COLON CANCER: ICD-10-CM

## 2021-09-04 PROCEDURE — 45378 DIAGNOSTIC COLONOSCOPY: CPT | Performed by: INTERNAL MEDICINE

## 2021-09-04 RX ORDER — PROPOFOL 10 MG/ML
INJECTION, EMULSION INTRAVENOUS AS NEEDED
Status: DISCONTINUED | OUTPATIENT
Start: 2021-09-04 | End: 2021-09-04

## 2021-09-04 RX ORDER — SODIUM CHLORIDE, SODIUM LACTATE, POTASSIUM CHLORIDE, CALCIUM CHLORIDE 600; 310; 30; 20 MG/100ML; MG/100ML; MG/100ML; MG/100ML
INJECTION, SOLUTION INTRAVENOUS CONTINUOUS PRN
Status: DISCONTINUED | OUTPATIENT
Start: 2021-09-04 | End: 2021-09-04

## 2021-09-04 RX ADMIN — PROPOFOL 20 MG: 10 INJECTION, EMULSION INTRAVENOUS at 07:44

## 2021-09-04 RX ADMIN — PROPOFOL 20 MG: 10 INJECTION, EMULSION INTRAVENOUS at 07:38

## 2021-09-04 RX ADMIN — SODIUM CHLORIDE, SODIUM LACTATE, POTASSIUM CHLORIDE, AND CALCIUM CHLORIDE: .6; .31; .03; .02 INJECTION, SOLUTION INTRAVENOUS at 07:20

## 2021-09-04 RX ADMIN — PROPOFOL 20 MG: 10 INJECTION, EMULSION INTRAVENOUS at 07:40

## 2021-09-04 RX ADMIN — PROPOFOL 20 MG: 10 INJECTION, EMULSION INTRAVENOUS at 07:46

## 2021-09-04 RX ADMIN — PROPOFOL 20 MG: 10 INJECTION, EMULSION INTRAVENOUS at 07:42

## 2021-09-04 RX ADMIN — PROPOFOL 120 MG: 10 INJECTION, EMULSION INTRAVENOUS at 07:36

## 2021-09-04 NOTE — ANESTHESIA POSTPROCEDURE EVALUATION
Post-Op Assessment Note    CV Status:  Stable    Pain management: adequate     Mental Status:  Alert and awake   Hydration Status:  Euvolemic   PONV Controlled:  Controlled   Airway Patency:  Patent      Post Op Vitals Reviewed: Yes      Staff: CRNA         No complications documented      /82 (09/04/21 0750)    Temp (!) 97 4 °F (36 3 °C) (09/04/21 0750)    Pulse 58 (09/04/21 0750)   Resp 19 (09/04/21 0750)    SpO2 100 % (09/04/21 0750)

## 2021-09-04 NOTE — ANESTHESIA PREPROCEDURE EVALUATION
Procedure:  COLONOSCOPY    Relevant Problems   CARDIO   (+) Chest pain      Denies cp, sob, arana, recent uri  No recent pd5 inhibitor use  Physical Exam    Airway    Mallampati score: II  TM Distance: >3 FB  Neck ROM: full     Dental   No notable dental hx     Cardiovascular  Rhythm: regular, Rate: normal,     Pulmonary  Breath sounds clear to auscultation,     Other Findings        Anesthesia Plan  ASA Score- 2     Anesthesia Type- IV sedation with anesthesia with ASA Monitors  Additional Monitors:   Airway Plan:           Plan Factors-Exercise tolerance (METS): >4 METS  Chart reviewed  EKG reviewed  Imaging results reviewed  Existing labs reviewed  Patient summary reviewed  Patient is not a current smoker  Induction- intravenous  Postoperative Plan-     Informed Consent- Anesthetic plan and risks discussed with patient  I personally reviewed this patient with the CRNA  Discussed and agreed on the Anesthesia Plan with the CRNA  Sandrita Atkins

## 2021-09-04 NOTE — DISCHARGE INSTRUCTIONS
Colonoscopy   WHAT YOU NEED TO KNOW:   A colonoscopy is a procedure to examine the inside of your colon (intestine) with a scope  Polyps or tissue growths may have been removed during your colonoscopy  It is normal to feel bloated and to have some abdominal discomfort  You should be passing gas  If you have hemorrhoids or you had polyps removed, you may have a small amount of bleeding  DISCHARGE INSTRUCTIONS:   Seek care immediately if:   · You have a large amount of bright red blood in your bowel movements  · Your abdomen is hard and firm and you have severe pain  · You have sudden trouble breathing  Contact your healthcare provider if:   · You develop a rash or hives  · You have a fever within 24 hours of your procedure       · You have not had a bowel movement for 3 days after your procedure  · You have questions or concerns about your condition or care  Activity:   · Do not lift, strain, or run  for 3 days after your procedure  · Rest after your procedure  You have been given medicine to relax you  Do not  drive or make important decisions until the day after your procedure  Return to your normal activity as directed  · Relieve gas and discomfort from bloating  by lying on your right side with a heating pad on your abdomen  You may need to take short walks to help the gas move out  Eat small meals until bloating is relieved  If you had polyps removed: For 7 days after your procedure:  · Do not  take aspirin  · Do not  go on long car rides  Follow up with your healthcare provider as directed:  Write down your questions so you remember to ask them during your visits  © 2017 [address] [employer] is for End User's use only and may not be sold, redistributed or otherwise used for commercial purposes  All illustrations and images included in CareNotes® are the copyrighted property of [employer]  or [employer]    The above information is an  only  It is not intended as medical advice for individual conditions or treatments  Talk to your doctor, nurse or pharmacist before following any medical regimen to see if it is safe and effective for you

## 2021-09-04 NOTE — H&P
History and Physical - [facility]  Angela Justice 55 y o  male MRN: [id number]                  HPI: Angela Justice is a 55y o  year old male who presents for colonoscopy for evaluation of colon cancer screening      REVIEW OF SYSTEMS: Per the HPI, and otherwise unremarkable  Historical Information   Past Medical History:   Diagnosis Date    Erectile dysfunction     Unspecified atrial fibrillation ([address] )     Resolved 6/29/2016      Past Surgical History:   Procedure Laterality Date    CARDIAC SURGERY      a-fib 2013     Social History   Social History     Substance and Sexual Activity   Alcohol Use Yes    Alcohol/week: 2 0 standard drinks    Types: 2 Glasses of wine per week    Comment: ocassionally      Social History     Substance and Sexual Activity   Drug Use No     Social History     Tobacco Use   Smoking Status Never Smoker   Smokeless Tobacco Never Used     Family History   Problem Relation Age of Onset    Diabetes Mother     Diabetes type II Mother     Hypertension Mother     Diabetes Father     Hypertension Family         Ess hypertension        Meds/Allergies     (Not in a hospital admission)      No Known Allergies    Objective     Blood pressure 140/88, pulse 69, temperature 97 6 °F (36 4 °C), temperature source Temporal, resp  rate 20, height 6' (1 829 m), weight 92 5 kg (204 lb), SpO2 99 %  PHYSICAL EXAM    /88   Pulse 69   Temp 97 6 °F (36 4 °C) (Temporal)   Resp 20   Ht 6' (1 829 m)   Wt 92 5 kg (204 lb)   SpO2 99%   BMI 27 67 kg/m²       Gen: NAD  CV: RRR  CHEST: Clear  ABD: soft, NT/ND  EXT: no edema      ASSESSMENT/PLAN:  This is a 55y o  year old male here for colonoscopy, and he is stable and optimized for his procedure

## 2021-12-03 ENCOUNTER — APPOINTMENT (OUTPATIENT)
Dept: LAB | Facility: CLINIC | Age: 56
End: 2021-12-03
Payer: COMMERCIAL

## 2021-12-03 ENCOUNTER — OFFICE VISIT (OUTPATIENT)
Dept: UROLOGY | Facility: CLINIC | Age: 56
End: 2021-12-03
Payer: COMMERCIAL

## 2021-12-03 VITALS
SYSTOLIC BLOOD PRESSURE: 136 MMHG | BODY MASS INDEX: 28.99 KG/M2 | WEIGHT: 214 LBS | HEIGHT: 72 IN | DIASTOLIC BLOOD PRESSURE: 88 MMHG | HEART RATE: 66 BPM

## 2021-12-03 DIAGNOSIS — N52.8 OTHER MALE ERECTILE DYSFUNCTION: ICD-10-CM

## 2021-12-03 DIAGNOSIS — R68.82 LOW LIBIDO: ICD-10-CM

## 2021-12-03 DIAGNOSIS — R68.82 LOW LIBIDO: Primary | ICD-10-CM

## 2021-12-03 PROCEDURE — 84403 ASSAY OF TOTAL TESTOSTERONE: CPT

## 2021-12-03 PROCEDURE — 3008F BODY MASS INDEX DOCD: CPT | Performed by: PHYSICIAN ASSISTANT

## 2021-12-03 PROCEDURE — 99213 OFFICE O/P EST LOW 20 MIN: CPT | Performed by: PHYSICIAN ASSISTANT

## 2021-12-03 PROCEDURE — 84402 ASSAY OF FREE TESTOSTERONE: CPT

## 2021-12-03 PROCEDURE — 36415 COLL VENOUS BLD VENIPUNCTURE: CPT

## 2021-12-03 PROCEDURE — 1036F TOBACCO NON-USER: CPT | Performed by: PHYSICIAN ASSISTANT

## 2021-12-03 RX ORDER — SILDENAFIL 100 MG/1
100 TABLET, FILM COATED ORAL AS NEEDED
Qty: 30 TABLET | Refills: 2 | Status: SHIPPED | OUTPATIENT
Start: 2021-12-03 | End: 2022-01-02

## 2021-12-04 LAB
TESTOST FREE SERPL-MCNC: 11.2 PG/ML (ref 7.2–24)
TESTOST SERPL-MCNC: 392 NG/DL (ref 264–916)

## 2021-12-06 ENCOUNTER — TELEPHONE (OUTPATIENT)
Dept: UROLOGY | Facility: CLINIC | Age: 56
End: 2021-12-06

## 2022-01-06 ENCOUNTER — TELEPHONE (OUTPATIENT)
Dept: INTERNAL MEDICINE CLINIC | Facility: CLINIC | Age: 57
End: 2022-01-06

## 2022-01-06 DIAGNOSIS — Z11.52 ENCOUNTER FOR SCREENING FOR COVID-19: Primary | ICD-10-CM

## 2022-01-07 ENCOUNTER — TELEPHONE (OUTPATIENT)
Dept: INTERNAL MEDICINE CLINIC | Facility: CLINIC | Age: 57
End: 2022-01-07

## 2022-01-07 PROCEDURE — U0003 INFECTIOUS AGENT DETECTION BY NUCLEIC ACID (DNA OR RNA); SEVERE ACUTE RESPIRATORY SYNDROME CORONAVIRUS 2 (SARS-COV-2) (CORONAVIRUS DISEASE [COVID-19]), AMPLIFIED PROBE TECHNIQUE, MAKING USE OF HIGH THROUGHPUT TECHNOLOGIES AS DESCRIBED BY CMS-2020-01-R: HCPCS | Performed by: FAMILY MEDICINE

## 2022-01-10 LAB — SARS-COV-2 RNA RESP QL NAA+PROBE: NEGATIVE

## 2022-01-11 ENCOUNTER — TELEPHONE (OUTPATIENT)
Dept: INTERNAL MEDICINE CLINIC | Facility: CLINIC | Age: 57
End: 2022-01-11

## 2022-01-11 NOTE — TELEPHONE ENCOUNTER
----- Message from Dimitrios Tristan DO sent at 1/11/2022  2:14 PM EST -----  Please notify pt of negative testing

## 2022-01-28 ENCOUNTER — IMMUNIZATIONS (OUTPATIENT)
Dept: FAMILY MEDICINE CLINIC | Facility: HOSPITAL | Age: 57
End: 2022-01-28

## 2022-01-28 DIAGNOSIS — Z23 ENCOUNTER FOR IMMUNIZATION: Primary | ICD-10-CM

## 2022-01-28 PROCEDURE — 0001A COVID-19 PFIZER VACC 0.3 ML: CPT

## 2022-01-28 PROCEDURE — 91300 COVID-19 PFIZER VACC 0.3 ML: CPT

## 2023-06-16 ENCOUNTER — OFFICE VISIT (OUTPATIENT)
Age: 58
End: 2023-06-16
Payer: COMMERCIAL

## 2023-06-16 ENCOUNTER — APPOINTMENT (OUTPATIENT)
Age: 58
End: 2023-06-16
Payer: COMMERCIAL

## 2023-06-16 VITALS
HEART RATE: 91 BPM | DIASTOLIC BLOOD PRESSURE: 76 MMHG | TEMPERATURE: 97.5 F | OXYGEN SATURATION: 98 % | WEIGHT: 219.8 LBS | SYSTOLIC BLOOD PRESSURE: 122 MMHG | BODY MASS INDEX: 29.81 KG/M2 | RESPIRATION RATE: 18 BRPM

## 2023-06-16 DIAGNOSIS — Z11.59 NEED FOR HEPATITIS C SCREENING TEST: ICD-10-CM

## 2023-06-16 DIAGNOSIS — Z00.00 ANNUAL PHYSICAL EXAM: Primary | ICD-10-CM

## 2023-06-16 DIAGNOSIS — K57.92 DIVERTICULITIS: ICD-10-CM

## 2023-06-16 DIAGNOSIS — I48.0 PAF (PAROXYSMAL ATRIAL FIBRILLATION) (HCC): ICD-10-CM

## 2023-06-16 DIAGNOSIS — Z00.00 ANNUAL PHYSICAL EXAM: ICD-10-CM

## 2023-06-16 DIAGNOSIS — Z98.890 HISTORY OF CARDIOVERSION: ICD-10-CM

## 2023-06-16 PROBLEM — R07.9 CHEST PAIN: Status: RESOLVED | Noted: 2020-01-29 | Resolved: 2023-06-16

## 2023-06-16 PROCEDURE — 80053 COMPREHEN METABOLIC PANEL: CPT

## 2023-06-16 PROCEDURE — 99214 OFFICE O/P EST MOD 30 MIN: CPT | Performed by: FAMILY MEDICINE

## 2023-06-16 PROCEDURE — 80061 LIPID PANEL: CPT

## 2023-06-16 PROCEDURE — G0103 PSA SCREENING: HCPCS

## 2023-06-16 PROCEDURE — 85025 COMPLETE CBC W/AUTO DIFF WBC: CPT

## 2023-06-16 PROCEDURE — 99396 PREV VISIT EST AGE 40-64: CPT | Performed by: FAMILY MEDICINE

## 2023-06-16 PROCEDURE — 86803 HEPATITIS C AB TEST: CPT

## 2023-06-16 RX ORDER — SULFAMETHOXAZOLE AND TRIMETHOPRIM 800; 160 MG/1; MG/1
1 TABLET ORAL 2 TIMES DAILY
Qty: 14 TABLET | Refills: 0 | Status: SHIPPED | OUTPATIENT
Start: 2023-06-16 | End: 2023-06-23

## 2023-06-16 RX ORDER — METRONIDAZOLE 500 MG/1
500 TABLET ORAL EVERY 8 HOURS SCHEDULED
Qty: 21 TABLET | Refills: 0 | Status: SHIPPED | OUTPATIENT
Start: 2023-06-16 | End: 2023-06-23

## 2023-06-16 NOTE — PROGRESS NOTES
ADULT ANNUAL [address],  [address] [facility]IMARY CARE [place]    NAME: Antonella Hogue  AGE: 63 y o  SEX: male  : 1965     DATE: 2023     Assessment and Plan:     Problem List Items Addressed This Visit    None  Visit Diagnoses     Annual physical exam    -  Primary    Relevant Orders    Comprehensive metabolic panel    Lipid Panel with Direct LDL reflex    CBC and differential    PSA, Total Screen    PAF (paroxysmal atrial fibrillation) (HCC)        Relevant Medications    apixaban (ELIQUIS) 5 mg    Other Relevant Orders    Ambulatory Referral to Cardiology    Diverticulitis        Relevant Medications    sulfamethoxazole-trimethoprim (BACTRIM DS) 800-160 mg per tablet    metroNIDAZOLE (FLAGYL) 500 mg tablet    History of cardioversion        Relevant Orders    Ambulatory Referral to Cardiology    Need for hepatitis C screening test        Relevant Orders    Hepatitis C Antibody        Plan:   abdominal pain likely an acute episode of diverticulitis based on exam and hx  Will treat with therapy above  Pt has hx of PAF  Had radioablation(or cardioversion, pt unsure)  therapy in   Presents now with afib  Rate <100  Asymptomatic  Will start anticoagulation and have pt establish with cardiology for further management  Immunizations and preventive care screenings were discussed with patient today  Appropriate education was printed on patient's after visit summary  Discussed risks and benefits of prostate cancer screening  We discussed the controversial history of PSA screening for prostate cancer in the [place] as well as the risk of over detection and over treatment of prostate cancer by way of PSA screening    The patient understands that PSA blood testing is an imperfect way to screen for prostate cancer and that elevated PSA levels in the blood may also be caused by infection, inflammation, prostatic trauma or manipulation, urological procedures, or by benign prostatic enlargement  The role of the digital rectal examination in prostate cancer screening was also discussed and I discussed with him that there is large interobserver variability in the findings of digital rectal examination  Counseling:  · Injury prevention: discussed safety/seat belts, safety helmets, smoke detectors, carbon dioxide detectors, and smoking near bedding or upholstery  Return in about 6 months (around 12/16/2023) for Next scheduled follow up  Chief Complaint:     Chief Complaint   Patient presents with   • Annual Exam     Pt is here for her annual also abdominal pain since yesterday doctor prescribe a penis erection vacuum not sure if that is causing his abdomen pain      History of Present Illness:     Adult Annual Physical   Patient here for a comprehensive physical exam  The patient reports   ABDOMINAL PAIN LOWER X 2 WEEK ASSOCIATED WITH NAUSEA AND ONE EIPSDE OF VOMTING  SOME LOOSE STOOL   S/P CARIDOVERSION IN 2012     Diet and Physical Activity  · Diet/Nutrition: well balanced diet  · Exercise: walking  Depression Screening  PHQ-2/9 Depression Screening    Little interest or pleasure in doing things: 0 - not at all  Feeling down, depressed, or hopeless: 0 - not at all  PHQ-2 Score: 0  PHQ-2 Interpretation: Negative depression screen       General Health  · Sleep: gets 4-6 hours of sleep on average  · Hearing: normal - bilateral   · Vision: wears glasses  · Dental: regular dental visits   Health  · Symptoms include: ED     Review of Systems:     Review of Systems   Constitutional: Negative for fever  Respiratory: Negative for shortness of breath  Cardiovascular: Negative for chest pain  Gastrointestinal: Positive for abdominal pain and diarrhea  Genitourinary: Positive for frequency  Negative for penile swelling and testicular pain  Neurological: Negative for headaches        Past Medical History:     Past Medical History:   Diagnosis Date   • Erectile dysfunction    • Unspecified atrial fibrillation ([facility] [address] )     Resolved 6/29/2016       Past Surgical History:     Past Surgical History:   Procedure Laterality Date   • CARDIAC SURGERY      a-fib 2013      Family History:     Family History   Problem Relation Age of Onset   • Diabetes Mother    • Diabetes type II Mother    • Hypertension Mother    • Diabetes Father    • Hypertension Family         Ess hypertension       Social History:     Social History     Socioeconomic History   • Marital status: /Civil Union     Spouse name: None   • Number of children: None   • Years of education: None   • Highest education level: None   Occupational History   • None   Tobacco Use   • Smoking status: Never   • Smokeless tobacco: Never   Vaping Use   • Vaping Use: Never used   Substance and Sexual Activity   • Alcohol use:  Yes     Alcohol/week: 2 0 standard drinks of alcohol     Types: 2 Glasses of wine per week     Comment: ocassionally    • Drug use: No   • Sexual activity: None   Other Topics Concern   • None   Social History Narrative   • None     Social Determinants of Health     Financial Resource Strain: Not on file   Food Insecurity: Not on file   Transportation Needs: Not on file   Physical Activity: Inactive (5/20/2021)    Exercise Vital Sign    • Days of Exercise per Week: 0 days    • Minutes of Exercise per Session: 0 min   Stress: No Stress Concern Present (5/20/2021)    [address]    • Feeling of Stress : Not at all   Social Connections: Not on file   Intimate Partner Violence: Not on file   Housing Stability: Not on file      Current Medications:     Current Outpatient Medications   Medication Sig Dispense Refill   • apixaban (ELIQUIS) 5 mg Take 1 tablet (5 mg total) by mouth 2 (two) times a day 180 tablet 1   • metroNIDAZOLE (FLAGYL) 500 mg tablet Take 1 tablet (500 mg total) by mouth every 8 (eight) hours for 7 days 21 tablet 0   • sulfamethoxazole-trimethoprim (BACTRIM DS) 800-160 mg per tablet Take 1 tablet by mouth 2 (two) times a day for 7 days 14 tablet 0   • sildenafil (VIAGRA) 100 mg tablet Take 1 tablet (100 mg total) by mouth as needed for erectile dysfunction 30 tablet 2     No current facility-administered medications for this visit  Allergies:     No Known Allergies   Physical Exam:     /76 (BP Location: Left arm, Patient Position: Sitting, Cuff Size: Standard)   Pulse 91   Temp 97 5 °F (36 4 °C) (Temporal)   Resp 18   Wt 99 7 kg (219 lb 12 8 oz)   SpO2 98%   BMI 29 81 kg/m²     Physical Exam  HENT:      Head: Normocephalic  Right Ear: External ear normal       Left Ear: External ear normal    Eyes:      Extraocular Movements: Extraocular movements intact  Conjunctiva/sclera: Conjunctivae normal    Cardiovascular:      Rate and Rhythm: Normal rate  Rhythm irregularly irregular  Heart sounds: No murmur heard  Pulmonary:      Effort: Pulmonary effort is normal       Breath sounds: Normal breath sounds  Abdominal:      Palpations: Abdomen is soft  Tenderness: There is abdominal tenderness in the left lower quadrant  There is no guarding or rebound  Musculoskeletal:      Right lower leg: No edema  Left lower leg: No edema  Neurological:      Mental Status: He is alert and oriented to person, place, and time        Gait: Gait normal    Psychiatric:         Mood and Affect: Mood normal          Behavior: Behavior normal           Josh Jernigan DO  [address]

## 2023-06-17 LAB
ALBUMIN SERPL BCP-MCNC: 3.7 G/DL (ref 3.5–5)
ALP SERPL-CCNC: 65 U/L (ref 46–116)
ALT SERPL W P-5'-P-CCNC: 50 U/L (ref 12–78)
ANION GAP SERPL CALCULATED.3IONS-SCNC: 2 MMOL/L (ref 4–13)
AST SERPL W P-5'-P-CCNC: 27 U/L (ref 5–45)
BASOPHILS # BLD AUTO: 0.03 THOUSANDS/ÂΜL (ref 0–0.1)
BASOPHILS NFR BLD AUTO: 1 % (ref 0–1)
BILIRUB SERPL-MCNC: 0.39 MG/DL (ref 0.2–1)
BUN SERPL-MCNC: 15 MG/DL (ref 5–25)
CALCIUM SERPL-MCNC: 9.6 MG/DL (ref 8.3–10.1)
CHLORIDE SERPL-SCNC: 112 MMOL/L (ref 96–108)
CHOLEST SERPL-MCNC: 185 MG/DL
CO2 SERPL-SCNC: 27 MMOL/L (ref 21–32)
CREAT SERPL-MCNC: 1.01 MG/DL (ref 0.6–1.3)
EOSINOPHIL # BLD AUTO: 0.14 THOUSAND/ÂΜL (ref 0–0.61)
EOSINOPHIL NFR BLD AUTO: 3 % (ref 0–6)
ERYTHROCYTE [DISTWIDTH] IN BLOOD BY AUTOMATED COUNT: 13.1 % (ref 11.6–15.1)
GFR SERPL CREATININE-BSD FRML MDRD: 82 ML/MIN/1.73SQ M
GLUCOSE P FAST SERPL-MCNC: 122 MG/DL (ref 65–99)
HCT VFR BLD AUTO: 45.2 % (ref 36.5–49.3)
HCV AB SER QL: NORMAL
HDLC SERPL-MCNC: 35 MG/DL
HGB BLD-MCNC: 14.8 G/DL (ref 12–17)
IMM GRANULOCYTES # BLD AUTO: 0.01 THOUSAND/UL (ref 0–0.2)
IMM GRANULOCYTES NFR BLD AUTO: 0 % (ref 0–2)
LDLC SERPL CALC-MCNC: 110 MG/DL (ref 0–100)
LYMPHOCYTES # BLD AUTO: 2.27 THOUSANDS/ÂΜL (ref 0.6–4.47)
LYMPHOCYTES NFR BLD AUTO: 42 % (ref 14–44)
MCH RBC QN AUTO: 28.1 PG (ref 26.8–34.3)
MCHC RBC AUTO-ENTMCNC: 32.7 G/DL (ref 31.4–37.4)
MCV RBC AUTO: 86 FL (ref 82–98)
MONOCYTES # BLD AUTO: 0.55 THOUSAND/ÂΜL (ref 0.17–1.22)
MONOCYTES NFR BLD AUTO: 10 % (ref 4–12)
NEUTROPHILS # BLD AUTO: 2.41 THOUSANDS/ÂΜL (ref 1.85–7.62)
NEUTS SEG NFR BLD AUTO: 44 % (ref 43–75)
NRBC BLD AUTO-RTO: 0 /100 WBCS
PLATELET # BLD AUTO: 212 THOUSANDS/UL (ref 149–390)
PMV BLD AUTO: 12.3 FL (ref 8.9–12.7)
POTASSIUM SERPL-SCNC: 4.4 MMOL/L (ref 3.5–5.3)
PROT SERPL-MCNC: 7.6 G/DL (ref 6.4–8.4)
PSA SERPL-MCNC: 1.55 NG/ML (ref 0–4)
RBC # BLD AUTO: 5.26 MILLION/UL (ref 3.88–5.62)
SODIUM SERPL-SCNC: 141 MMOL/L (ref 135–147)
TRIGL SERPL-MCNC: 202 MG/DL
WBC # BLD AUTO: 5.41 THOUSAND/UL (ref 4.31–10.16)

## 2023-06-19 ENCOUNTER — APPOINTMENT (EMERGENCY)
Dept: RADIOLOGY | Facility: HOSPITAL | Age: 58
End: 2023-06-19
Payer: COMMERCIAL

## 2023-06-19 ENCOUNTER — CONSULT (OUTPATIENT)
Dept: CARDIOLOGY CLINIC | Facility: CLINIC | Age: 58
End: 2023-06-19
Payer: COMMERCIAL

## 2023-06-19 ENCOUNTER — HOSPITAL ENCOUNTER (OUTPATIENT)
Facility: HOSPITAL | Age: 58
Setting detail: OBSERVATION
Discharge: HOME | End: 2023-06-20
Attending: EMERGENCY MEDICINE | Admitting: STUDENT IN AN ORGANIZED HEALTH CARE EDUCATION/TRAINING PROGRAM
Payer: COMMERCIAL

## 2023-06-19 ENCOUNTER — TELEPHONE (OUTPATIENT)
Dept: CARDIOLOGY CLINIC | Facility: CLINIC | Age: 58
End: 2023-06-19

## 2023-06-19 VITALS
WEIGHT: 218 LBS | OXYGEN SATURATION: 95 % | BODY MASS INDEX: 29.53 KG/M2 | HEART RATE: 65 BPM | HEIGHT: 72 IN | RESPIRATION RATE: 16 BRPM

## 2023-06-19 DIAGNOSIS — I48.92 ATRIAL FLUTTER, UNSPECIFIED TYPE (HCC): Primary | ICD-10-CM

## 2023-06-19 DIAGNOSIS — I48.3 TYPICAL ATRIAL FLUTTER (HCC): ICD-10-CM

## 2023-06-19 DIAGNOSIS — I48.92 ATRIAL FLUTTER (HCC): Primary | ICD-10-CM

## 2023-06-19 DIAGNOSIS — Z98.890 HISTORY OF CARDIOVERSION: ICD-10-CM

## 2023-06-19 DIAGNOSIS — I48.0 PAF (PAROXYSMAL ATRIAL FIBRILLATION) (HCC): Primary | ICD-10-CM

## 2023-06-19 DIAGNOSIS — I48.0 PAF (PAROXYSMAL ATRIAL FIBRILLATION) (HCC): ICD-10-CM

## 2023-06-19 DIAGNOSIS — I34.0 MITRAL VALVE INSUFFICIENCY, UNSPECIFIED ETIOLOGY: ICD-10-CM

## 2023-06-19 DIAGNOSIS — I48.92 ATRIAL FLUTTER, UNSPECIFIED TYPE (HCC): ICD-10-CM

## 2023-06-19 PROBLEM — Z92.89 HISTORY OF CARDIOVERSION: Status: ACTIVE | Noted: 2023-06-19

## 2023-06-19 PROBLEM — K57.90 DIVERTICULOSIS: Status: ACTIVE | Noted: 2023-06-19

## 2023-06-19 LAB
2HR DELTA HS TROPONIN: -1 NG/L
4HR DELTA HS TROPONIN: 0 NG/L
ANION GAP SERPL CALCULATED.3IONS-SCNC: 4 MMOL/L (ref 4–13)
BASOPHILS # BLD AUTO: 0.02 THOUSANDS/ÂΜL (ref 0–0.1)
BASOPHILS NFR BLD AUTO: 0 % (ref 0–1)
BUN SERPL-MCNC: 13 MG/DL (ref 5–25)
CALCIUM SERPL-MCNC: 9.5 MG/DL (ref 8.4–10.2)
CARDIAC TROPONIN I PNL SERPL HS: 6 NG/L
CARDIAC TROPONIN I PNL SERPL HS: 7 NG/L
CARDIAC TROPONIN I PNL SERPL HS: 7 NG/L
CHLORIDE SERPL-SCNC: 105 MMOL/L (ref 96–108)
CO2 SERPL-SCNC: 27 MMOL/L (ref 21–32)
CREAT SERPL-MCNC: 1.14 MG/DL (ref 0.6–1.3)
EOSINOPHIL # BLD AUTO: 0.13 THOUSAND/ÂΜL (ref 0–0.61)
EOSINOPHIL NFR BLD AUTO: 3 % (ref 0–6)
ERYTHROCYTE [DISTWIDTH] IN BLOOD BY AUTOMATED COUNT: 12.7 % (ref 11.6–15.1)
GFR SERPL CREATININE-BSD FRML MDRD: 70 ML/MIN/1.73SQ M
GLUCOSE SERPL-MCNC: 96 MG/DL (ref 65–140)
HCT VFR BLD AUTO: 41.6 % (ref 36.5–49.3)
HGB BLD-MCNC: 14.1 G/DL (ref 12–17)
IMM GRANULOCYTES # BLD AUTO: 0.01 THOUSAND/UL (ref 0–0.2)
IMM GRANULOCYTES NFR BLD AUTO: 0 % (ref 0–2)
LYMPHOCYTES # BLD AUTO: 1.64 THOUSANDS/ÂΜL (ref 0.6–4.47)
LYMPHOCYTES NFR BLD AUTO: 34 % (ref 14–44)
MAGNESIUM SERPL-MCNC: 2 MG/DL (ref 1.9–2.7)
MCH RBC QN AUTO: 28.3 PG (ref 26.8–34.3)
MCHC RBC AUTO-ENTMCNC: 33.9 G/DL (ref 31.4–37.4)
MCV RBC AUTO: 84 FL (ref 82–98)
MONOCYTES # BLD AUTO: 0.54 THOUSAND/ÂΜL (ref 0.17–1.22)
MONOCYTES NFR BLD AUTO: 11 % (ref 4–12)
NEUTROPHILS # BLD AUTO: 2.54 THOUSANDS/ÂΜL (ref 1.85–7.62)
NEUTS SEG NFR BLD AUTO: 52 % (ref 43–75)
NRBC BLD AUTO-RTO: 0 /100 WBCS
PLATELET # BLD AUTO: 117 THOUSANDS/UL (ref 149–390)
PMV BLD AUTO: 13.6 FL (ref 8.9–12.7)
POTASSIUM SERPL-SCNC: 4.3 MMOL/L (ref 3.5–5.3)
RBC # BLD AUTO: 4.98 MILLION/UL (ref 3.88–5.62)
SODIUM SERPL-SCNC: 136 MMOL/L (ref 135–147)
TSH SERPL DL<=0.05 MIU/L-ACNC: 2.59 UIU/ML (ref 0.45–4.5)
WBC # BLD AUTO: 4.88 THOUSAND/UL (ref 4.31–10.16)

## 2023-06-19 PROCEDURE — 83735 ASSAY OF MAGNESIUM: CPT | Performed by: PHYSICIAN ASSISTANT

## 2023-06-19 PROCEDURE — 80048 BASIC METABOLIC PNL TOTAL CA: CPT | Performed by: PHYSICIAN ASSISTANT

## 2023-06-19 PROCEDURE — 84484 ASSAY OF TROPONIN QUANT: CPT | Performed by: PHYSICIAN ASSISTANT

## 2023-06-19 PROCEDURE — 96365 THER/PROPH/DIAG IV INF INIT: CPT

## 2023-06-19 PROCEDURE — 93000 ELECTROCARDIOGRAM COMPLETE: CPT | Performed by: INTERNAL MEDICINE

## 2023-06-19 PROCEDURE — 96376 TX/PRO/DX INJ SAME DRUG ADON: CPT

## 2023-06-19 PROCEDURE — 71045 X-RAY EXAM CHEST 1 VIEW: CPT

## 2023-06-19 PROCEDURE — NC001 PR NO CHARGE: Performed by: INTERNAL MEDICINE

## 2023-06-19 PROCEDURE — 99285 EMERGENCY DEPT VISIT HI MDM: CPT

## 2023-06-19 PROCEDURE — 93005 ELECTROCARDIOGRAM TRACING: CPT

## 2023-06-19 PROCEDURE — 85025 COMPLETE CBC W/AUTO DIFF WBC: CPT | Performed by: PHYSICIAN ASSISTANT

## 2023-06-19 PROCEDURE — 36415 COLL VENOUS BLD VENIPUNCTURE: CPT | Performed by: PHYSICIAN ASSISTANT

## 2023-06-19 PROCEDURE — 99204 OFFICE O/P NEW MOD 45 MIN: CPT | Performed by: INTERNAL MEDICINE

## 2023-06-19 PROCEDURE — 99223 1ST HOSP IP/OBS HIGH 75: CPT | Performed by: STUDENT IN AN ORGANIZED HEALTH CARE EDUCATION/TRAINING PROGRAM

## 2023-06-19 PROCEDURE — 84443 ASSAY THYROID STIM HORMONE: CPT | Performed by: PHYSICIAN ASSISTANT

## 2023-06-19 RX ORDER — DILTIAZEM HYDROCHLORIDE 120 MG/1
120 CAPSULE, COATED, EXTENDED RELEASE ORAL DAILY
Status: DISCONTINUED | OUTPATIENT
Start: 2023-06-19 | End: 2023-06-20 | Stop reason: HOSPADM

## 2023-06-19 RX ORDER — SULFAMETHOXAZOLE AND TRIMETHOPRIM 800; 160 MG/1; MG/1
1 TABLET ORAL 2 TIMES DAILY
Status: DISCONTINUED | OUTPATIENT
Start: 2023-06-19 | End: 2023-06-20 | Stop reason: HOSPADM

## 2023-06-19 RX ORDER — SODIUM CHLORIDE 9 MG/ML
3 INJECTION INTRAVENOUS
Status: DISCONTINUED | OUTPATIENT
Start: 2023-06-19 | End: 2023-06-20 | Stop reason: HOSPADM

## 2023-06-19 RX ORDER — DILTIAZEM HYDROCHLORIDE 120 MG/1
120 CAPSULE, COATED, EXTENDED RELEASE ORAL DAILY
Status: DISCONTINUED | OUTPATIENT
Start: 2023-06-20 | End: 2023-06-19

## 2023-06-19 RX ORDER — DILTIAZEM HYDROCHLORIDE 5 MG/ML
10 INJECTION INTRAVENOUS ONCE
Status: COMPLETED | OUTPATIENT
Start: 2023-06-19 | End: 2023-06-19

## 2023-06-19 RX ORDER — METRONIDAZOLE 500 MG/1
500 TABLET ORAL EVERY 8 HOURS SCHEDULED
Status: DISCONTINUED | OUTPATIENT
Start: 2023-06-19 | End: 2023-06-20 | Stop reason: HOSPADM

## 2023-06-19 RX ADMIN — METRONIDAZOLE 500 MG: 500 TABLET ORAL at 22:07

## 2023-06-19 RX ADMIN — DILTIAZEM HYDROCHLORIDE 10 MG: 5 INJECTION INTRAVENOUS at 11:03

## 2023-06-19 RX ADMIN — SULFAMETHOXAZOLE AND TRIMETHOPRIM 1 TABLET: 800; 160 TABLET ORAL at 22:07

## 2023-06-19 RX ADMIN — DILTIAZEM HYDROCHLORIDE 10 MG/HR: 5 INJECTION INTRAVENOUS at 11:08

## 2023-06-19 RX ADMIN — APIXABAN 5 MG: 5 TABLET, FILM COATED ORAL at 18:00

## 2023-06-19 RX ADMIN — METRONIDAZOLE 500 MG: 500 TABLET ORAL at 18:00

## 2023-06-19 RX ADMIN — DILTIAZEM HYDROCHLORIDE 120 MG: 120 CAPSULE, COATED, EXTENDED RELEASE ORAL at 18:56

## 2023-06-19 NOTE — ASSESSMENT & PLAN NOTE
60-year-old male patient with past medical history of A-fib with history of cardioversion in the past, was sent in to emergency room for admission by cardiology office due to A-fib RVR  Seems like patient has converted to normal sinus rhythm after being on Cardizem drip  CBC, BMP within normal  Troponins - x3  Currently asymptomatic  CHADS2 Vascor of 0  Continue p o  Cardizem  mg daily  Currently on Eliquis while pending DAMION however likely transition to aspirin after  Cardiology recommendation appreciated  Continue telemetry monitoring

## 2023-06-19 NOTE — CONSULTS
Consultation - Cardiology   Kacy Roberts 63 y o  male MRN: [id number]  Unit/Bed#: -01 Encounter: [id number]  06/19/23  4:22 PM    Assessment/ Plan:  1  Paroxysmal atrial flutter with RVR on admission has now converted back to sinus rhythm after Cardizem drip  Currently in sinus rhythm in the 70s  Will start on long-acting Cardizem 1 and 20 mg  Patient's [id number] score =0  Check echocardiogram to assess LV function, regional wall motion abnormalities  Monitor telemetry  Check TSH/HgbA1c  History of Present Illness   Physician Requesting Consult: Jose Huff MD    Reason for Consult / Principal Problem: aflutter    HPI: Kacy Roberts is a 63y o  year old male who presents with rapid a flutter  Patient seen at cardiology office this morning, found to have a flutter with RVR heart rate in the 120s  Patient has history of paroxysmal atrial fibrillation dating back to 2012 when he was then cardioverted  Patient states he had been feeling well except over the last 2 weeks he began going to the gym with his son and began noticing his heart was racing  Patient seen at primary care physician's office over the weekend found to be tachycardic, started on Eliquis, sent for cardiology evaluation  Given patient's new atrial flutter patient advised to come to the emergency room for evaluation  Patient started on IV Cardizem while in the emergency room and admitted to the hospital   Patient now being seen on the floor and is now converted back to sinus rhythm  Heart rate 70s  Patient has no complaints of chest pain, chest pressure, chest heaviness  Patient denies any shortness of breath, dizziness, syncope  PMH: PAF with ablation in  2012    Consults    EKG: A flutter, heart rate 119  Telemetry: Normal sinus rhythm 70s      Review of Systems   Constitutional: Negative  Respiratory: Negative  Cardiovascular: Positive for palpitations  Neurological: Negative      Hematological: Negative  Psychiatric/Behavioral: Negative  All other systems reviewed and are negative  Historical Information   Past Medical History:   Diagnosis Date   • Erectile dysfunction    • Unspecified atrial fibrillation ([address] )     Resolved 2016      Past Surgical History:   Procedure Laterality Date   • CARDIAC SURGERY      a-2013     Social History     Substance and Sexual Activity   Alcohol Use Yes   • Alcohol/week: 2 0 standard drinks of alcohol   • Types: 2 Glasses of wine per week    Comment: ocassionally      Social History     Substance and Sexual Activity   Drug Use No     Social History     Tobacco Use   Smoking Status Never   Smokeless Tobacco Never       Family History:   Family History   Problem Relation Age of Onset   • Diabetes Mother    • Diabetes type II Mother    • Hypertension Mother    • Diabetes Father    • Hypertension Family         Ess hypertension        Meds/Allergies   all current active meds have been reviewed and current meds:   Current Facility-Administered Medications   Medication Dose Route Frequency   • apixaban (ELIQUIS) tablet 5 mg  5 mg Oral BID   • [START ON 2023] diltiazem (CARDIZEM CD) 24 hr capsule 120 mg  120 mg Oral Daily   • sodium chloride (PF) 0 9 % injection 3 mL  3 mL Intravenous Q1H PRN     No Known Allergies    Objective   Vitals: Blood pressure 108/76, pulse 81, temperature 98 6 °F (37 °C), resp  rate 19, height 6' (1 829 m), weight 98 9 kg (218 lb), SpO2 95 %  , Body mass index is 29 57 kg/m² ,   Orthostatic Blood Pressures    Flowsheet Row Most Recent Value   Blood Pressure 108/76 filed at 2023 1546   Patient Position - Orthostatic VS Sitting filed at 2023 4181          Systolic (50MUS), GJN:062 , Min:100 , WLA:841     Diastolic (57MGX), LK, Min:69, Max:93      No intake or output data in the 24 hours ending 23 1622    Invasive Devices     Peripheral Intravenous Line  Duration           Peripheral IV 23 Left;Ventral (anterior) Forearm <1 day                    Physical Exam:  GEN: Alert and oriented x 3, in no acute distress  Well appearing and well nourished  HEENT: Sclera anicteric, conjunctivae pink, mucous membranes moist  Oropharynx clear  NECK: Supple, no carotid bruits, no significant JVD  Trachea midline, no thyromegaly  HEART: Regular rhythm, normal S1 and S2, no murmurs, clicks, gallops or rubs  PMI nondisplaced, no thrills  LUNGS: Clear to auscultation bilaterally; no wheezes, rales, or rhonchi  No increased work of breathing or signs of respiratory distress  ABDOMEN: Soft, nontender, nondistended, normoactive bowel sounds  EXTREMITIES: Skin warm and well perfused, no clubbing, cyanosis, or edema  NEURO: No focal findings  Normal speech  Mood and affect normal    SKIN: Normal without suspicious lesions on exposed skin        Lab Results:     Troponins:   Results from last 7 days   Lab Units 06/19/23  1450 06/19/23  1301 06/19/23  1057   HS TNI 0HR ng/L  --   --  7   HS TNI 2HR ng/L  --  6  --    HS TNI 4HR ng/L 7  --   --    HSTNI D4 ng/L 0  --   --        CBC with diff:   Results from last 7 days   Lab Units 06/19/23  1057 06/17/23  0119   WBC Thousand/uL 4 88 5 41   HEMOGLOBIN g/dL 14 1 14 8   HEMATOCRIT % 41 6 45 2   MCV fL 84 86   PLATELETS Thousands/uL 117* 212   RBC Million/uL 4 98 5 26   MCH pg 28 3 28 1   MCHC g/dL 33 9 32 7   RDW % 12 7 13 1   MPV fL 13 6* 12 3   NRBC AUTO /100 WBCs 0 0         CMP:   Results from last 7 days   Lab Units 06/19/23  1057 06/17/23  0149   POTASSIUM mmol/L 4 3 4 4   CHLORIDE mmol/L 105 112*   CO2 mmol/L 27 27   BUN mg/dL 13 15   CREATININE mg/dL 1 14 1 01   CALCIUM mg/dL 9 5 9 6   AST U/L  --  27   ALT U/L  --  50   ALK PHOS U/L  --  65   EGFR ml/min/1 73sq m 70 82

## 2023-06-19 NOTE — H&P
[address] [place] [address]  H&P  Name: Arsalan León 63 y o  male I MRN: [id number]  Unit/Bed#: -01 I Date of Admission: 6/19/2023   Date of Service: 6/19/2023 I Hospital Day: 0      Assessment/Plan   * PAF (paroxysmal atrial fibrillation) [facility])  Assessment & Plan  80-year-old male patient with past medical history of A-fib with history of cardioversion in the past, was sent in to emergency room for admission by cardiology office due to A-fib RVR  Seems like patient has converted to normal sinus rhythm after being on Cardizem drip  CBC, BMP within normal  Troponins - x3  Currently asymptomatic  CHADS2 Vascor of 0  Continue p o  Cardizem  mg daily  Currently on Eliquis while pending DAMION however likely transition to aspirin after  Cardiology recommendation appreciated  Continue telemetry monitoring  Diverticulosis  Assessment & Plan  Patient has history of diverticulosis  Started on 7 days of Flagyl, Bactrim for presumed diverticulitis by PCP on 06/16/2023       VTE Pharmacologic Prophylaxis: VTE Score: 1 Moderate Risk (Score 3-4) - Pharmacological DVT Prophylaxis Ordered: apixaban (Eliquis)  Code Status: Level 1 - Full Code   Discussion with family: Updated  (wife) at bedside  Anticipated Length of Stay: Patient will be admitted on an observation basis with an anticipated length of stay of less than 2 midnights secondary to A-fib RVR  Total Time Spent on Date of Encounter in care of patient: 65 minutes This time was spent on one or more of the following: performing physical exam; counseling and coordination of care; obtaining or reviewing history; documenting in the medical record; reviewing/ordering tests, medications or procedures; communicating with other healthcare professionals and discussing with patient's family/caregivers      Chief Complaint: A-fib RVR    History of Present Illness:    Arsalan León is a 63 y o  male with a PMH of A-fib/a flutter with history of cardioversion in 2012  Patient referred to emergency room by cardiology office for A-fib RVR  Patient had been feeling well except last 2 weeks he started having symptoms of heart racing during working out at the gym  Patient was seen by primary care physicians over the weekend and found to be in paroxysmal A-fib and started on Eliquis and was sent to cardiology for evaluation  Patient was noted with rapid ventricular rate during evaluation and cardiology referred him to emergency room for evaluation and likely cardioversion  Patient was treated with IV Cardizem drip in the ER and converted to normal sinus rhythm  Currently on encounter patient is asymptomatic and in normal sinus rhythm  Denies any complaints at this time  Review of Systems:  Review of Systems   Constitutional: Negative for chills, diaphoresis and fatigue  HENT: Negative for congestion  Eyes: Negative for visual disturbance  Respiratory: Negative for shortness of breath  Cardiovascular: Negative for chest pain, palpitations and leg swelling  Gastrointestinal: Negative for abdominal pain, nausea and vomiting  Endocrine: Negative for polyuria  Genitourinary: Negative for dysuria  Psychiatric/Behavioral: Negative for agitation  Past Medical and Surgical History:   Past Medical History:   Diagnosis Date   • Erectile dysfunction    • Unspecified atrial fibrillation ([facility] [address] )     Resolved 6/29/2016        Past Surgical History:   Procedure Laterality Date   • CARDIAC SURGERY      a-fib 2013       Meds/Allergies:  Prior to Admission medications    Medication Sig Start Date End Date Taking?  Authorizing Provider   apixaban (ELIQUIS) 5 mg Take 1 tablet (5 mg total) by mouth 2 (two) times a day 6/16/23 6/10/24  Amish Mccormack DO   metroNIDAZOLE (FLAGYL) 500 mg tablet Take 1 tablet (500 mg total) by mouth every 8 (eight) hours for 7 days 6/16/23 6/23/23  Amish Mccormack DO   sildenafil (VIAGRA) 100 mg tablet Take 1 tablet (100 mg total) by mouth as needed for erectile dysfunction  Patient not taking: Reported on 6/19/2023 12/3/21 6/19/23  Love PresidentSTEFANO   sulfamethoxazole-trimethoprim (BACTRIM DS) 800-160 mg per tablet Take 1 tablet by mouth 2 (two) times a day for 7 days 6/16/23 6/23/23  Aletha Conway,      I have reviewed home medications with a medical source (PCP, Pharmacy, other)  Allergies: No Known Allergies    Social History:  Marital Status: /Civil Union     Substance Use History:   Social History     Substance and Sexual Activity   Alcohol Use Yes   • Alcohol/week: 2 0 standard drinks of alcohol   • Types: 2 Glasses of wine per week    Comment: ocassionally      Social History     Tobacco Use   Smoking Status Never   Smokeless Tobacco Never     Social History     Substance and Sexual Activity   Drug Use No       Family History:  Family History   Problem Relation Age of Onset   • Diabetes Mother    • Diabetes type II Mother    • Hypertension Mother    • Diabetes Father    • Hypertension Family         Ess hypertension        Physical Exam:     Vitals:   Blood Pressure: 108/76 (06/19/23 1546)  Pulse: 81 (06/19/23 1546)  Temperature: 98 6 °F (37 °C) (06/19/23 1546)  Temp Source: Oral (06/19/23 1032)  Respirations: 19 (06/19/23 1500)  Height: 6' (182 9 cm) (06/19/23 1032)  Weight - Scale: 98 9 kg (218 lb) (06/19/23 1032)  SpO2: 95 % (06/19/23 1546)    Physical Exam  Constitutional:       General: He is not in acute distress  Appearance: Normal appearance  He is not ill-appearing  HENT:      Head: Normocephalic and atraumatic  Eyes:      Pupils: Pupils are equal, round, and reactive to light  Cardiovascular:      Rate and Rhythm: Normal rate and regular rhythm  Pulses: Normal pulses  Pulmonary:      Effort: Pulmonary effort is normal  No respiratory distress  Breath sounds: Normal breath sounds     Abdominal:      General: Bowel sounds are normal  There is no distension  Palpations: Abdomen is soft  Musculoskeletal:      Cervical back: No rigidity  Right lower leg: No edema  Left lower leg: No edema  Neurological:      Mental Status: He is alert and oriented to person, place, and time  Mental status is at baseline  Psychiatric:         Mood and Affect: Mood normal          Behavior: Behavior normal          Additional Data:     Lab Results:  Results from last 7 days   Lab Units 06/19/23  1057   WBC Thousand/uL 4 88   HEMOGLOBIN g/dL 14 1   HEMATOCRIT % 41 6   PLATELETS Thousands/uL 117*   NEUTROS PCT % 52   LYMPHS PCT % 34   MONOS PCT % 11   EOS PCT % 3     Results from last 7 days   Lab Units 06/19/23  1057 06/17/23  0149   SODIUM mmol/L 136 141   POTASSIUM mmol/L 4 3 4 4   CHLORIDE mmol/L 105 112*   CO2 mmol/L 27 27   BUN mg/dL 13 15   CREATININE mg/dL 1 14 1 01   ANION GAP mmol/L 4 2*   CALCIUM mg/dL 9 5 9 6   ALBUMIN g/dL  --  3 7   TOTAL BILIRUBIN mg/dL  --  0 39   ALK PHOS U/L  --  65   ALT U/L  --  50   AST U/L  --  27   GLUCOSE RANDOM mg/dL 96  --                        Lines/Drains:  Invasive Devices     Peripheral Intravenous Line  Duration           Peripheral IV 06/19/23 Left;Ventral (anterior) Forearm <1 day                    Imaging: Reviewed radiology reports from this admission including: chest xray  X-ray chest 1 view portable   Final Result by Otto Ellsworth MD (06/19 3600)      No acute cardiopulmonary disease  Findings are stable            Workstation performed: [id number]                 ** Please Note: This note has been constructed using a voice recognition system   **

## 2023-06-19 NOTE — PLAN OF CARE
Problem: PAIN - ADULT  Goal: Verbalizes/displays adequate comfort level or baseline comfort level  Description: Interventions:  - Encourage patient to monitor pain and request assistance  - Assess pain using appropriate pain scale  - Administer analgesics based on type and severity of pain and evaluate response  - Implement non-pharmacological measures as appropriate and evaluate response  - Consider cultural and social influences on pain and pain management  - Notify physician/advanced practitioner if interventions unsuccessful or patient reports new pain  Outcome: Progressing     Problem: SAFETY ADULT  Goal: Patient will remain free of falls  Description: INTERVENTIONS:  - Educate patient/family on patient safety including physical limitations  - Instruct patient to call for assistance with activity   - Consult OT/PT to assist with strengthening/mobility   - Keep Call bell within reach  - Keep bed low and locked with side rails adjusted as appropriate  - Keep care items and personal belongings within reach  - Initiate and maintain comfort rounds  - Make Fall Risk Sign visible to staff  - Offer Toileting every 2 Hours, in advance of need  - Initiate/Maintain bed alarm  - Obtain necessary fall risk management equipment: call bell within reach   - Apply yellow socks and bracelet for high fall risk patients  - Consider moving patient to room near nurses station  Outcome: Progressing

## 2023-06-19 NOTE — ASSESSMENT & PLAN NOTE
Patient has history of diverticulosis    Started on 7 days of Flagyl, Bactrim for presumed diverticulitis by PCP on 06/16/2023

## 2023-06-19 NOTE — ED PROVIDER NOTES
History  Chief Complaint   Patient presents with   • Palpitations     Pt states he was sent here by cardiology for a-flutter  50-year-old male with past medical history significant for atrial fibrillation status post ablation procedure in 2012 in [place] presents to the emergency department with chief complaint sent by cardiologist from office for new onset a flutter with recommendation for ED evaluation and admission to pursue DAMION and potential cardioversion  Patient reports over the past week he has been worked up by his primary care physician for some abdominal pain  Found to have diverticulosis  During the work-up was noted to have irregular heart rate, was seen by cardiology today on an office visit and noted to be in new onset a flutter  Patient denies any shortness of breath, lower extremity swelling dizziness or syncope  Denies fevers or chills  Denies chest pain  Currently only takes aspirin at home daily  Reports no rate blocking medications as he has not had any problems since his ablation in 2012  Chart review  Past Medical History:  No date: Erectile dysfunction  No date: Unspecified atrial fibrillation [facility])      Comment:  Resolved 6/29/2016   Past Surgical History:  No date: CARDIAC SURGERY      Comment:  a-jesu 2013  Social history: Non-smoker, patient reports 2 glasses of wine per week, denies IV drug use  History provided by:  Patient and significant other   used: No        Prior to Admission Medications   Prescriptions Last Dose Informant Patient Reported? Taking?    apixaban (ELIQUIS) 5 mg  Self No No   Sig: Take 1 tablet (5 mg total) by mouth 2 (two) times a day   metroNIDAZOLE (FLAGYL) 500 mg tablet  Self No No   Sig: Take 1 tablet (500 mg total) by mouth every 8 (eight) hours for 7 days   sildenafil (VIAGRA) 100 mg tablet  Self No No   Sig: Take 1 tablet (100 mg total) by mouth as needed for erectile dysfunction   Patient not taking: Reported on 6/19/2023   sulfamethoxazole-trimethoprim (BACTRIM DS) 800-160 mg per tablet  Self No No   Sig: Take 1 tablet by mouth 2 (two) times a day for 7 days      Facility-Administered Medications: None       Past Medical History:   Diagnosis Date   • Erectile dysfunction    • Unspecified atrial fibrillation ([facility] [address] )     Resolved 6/29/2016        Past Surgical History:   Procedure Laterality Date   • CARDIAC SURGERY      a-fib 2013       Family History   Problem Relation Age of Onset   • Diabetes Mother    • Diabetes type II Mother    • Hypertension Mother    • Diabetes Father    • Hypertension Family         Ess hypertension      I have reviewed and agree with the history as documented  E-Cigarette/Vaping   • E-Cigarette Use Never User      E-Cigarette/Vaping Substances   • Nicotine No    • THC No    • CBD No    • Flavoring No    • Other No    • Unknown No      Social History     Tobacco Use   • Smoking status: Never   • Smokeless tobacco: Never   Vaping Use   • Vaping Use: Never used   Substance Use Topics   • Alcohol use: Yes     Alcohol/week: 2 0 standard drinks of alcohol     Types: 2 Glasses of wine per week     Comment: ocassionally    • Drug use: No       Review of Systems   Constitutional: Negative for fever  Respiratory: Negative for chest tightness, shortness of breath and stridor  Cardiovascular: Negative for chest pain  Gastrointestinal: Negative for abdominal distention, diarrhea, nausea and vomiting  Musculoskeletal: Negative for gait problem  Skin: Negative for rash  Neurological: Negative for dizziness, seizures, syncope, facial asymmetry, weakness and numbness  All other systems reviewed and are negative  Physical Exam  Physical Exam  Vitals and nursing note reviewed  Constitutional:       General: He is not in acute distress  Appearance: Normal appearance        Comments: Pulse 117 and tachycardic, respiratory rate 23 and tachypneic, blood pressure 136/93, normal   O2 saturation 90% on room air  Afebrile 98 0 °F     Patient is awake alert and oriented on arrival      HENT:      Head: Normocephalic and atraumatic  Right Ear: External ear normal       Left Ear: External ear normal       Nose: Nose normal    Eyes:      General: No scleral icterus  Right eye: No discharge  Left eye: No discharge  Cardiovascular:      Rate and Rhythm: Tachycardia present  Pulses: Normal pulses  Pulmonary:      Effort: Pulmonary effort is normal       Breath sounds: Normal breath sounds  Abdominal:      Tenderness: There is no abdominal tenderness  There is no guarding or rebound  Musculoskeletal:         General: No tenderness, deformity or signs of injury  Normal range of motion  Cervical back: Normal range of motion and neck supple  Skin:     General: Skin is dry  Capillary Refill: Capillary refill takes less than 2 seconds  Coloration: Skin is not jaundiced  Findings: No erythema or rash  Neurological:      General: No focal deficit present  Mental Status: He is alert and oriented to person, place, and time  Mental status is at baseline  Motor: No weakness  Gait: Gait normal    Psychiatric:         Mood and Affect: Mood normal          Behavior: Behavior normal          Thought Content:  Thought content normal          Vital Signs  ED Triage Vitals [06/19/23 1032]   Temperature Pulse Respirations Blood Pressure SpO2   98 °F (36 7 °C) 66 18 134/84 99 %      Temp Source Heart Rate Source Patient Position - Orthostatic VS BP Location FiO2 (%)   Oral Monitor Sitting Left arm --      Pain Score       --           Vitals:    06/19/23 1200 06/19/23 1215 06/19/23 1245 06/19/23 1315   BP: 112/73 110/78 112/79 108/74   Pulse: 81 81 81 81   Patient Position - Orthostatic VS:             Visual Acuity      ED Medications  Medications   sodium chloride (PF) 0 9 % injection 3 mL (has no administration in time range)   diltiazem (CARDIZEM) injection 10 mg (10 mg Intravenous Given 6/19/23 1103)   diltiazem (CARDIZEM) 125 mg in sodium chloride 0 9 % 125 mL infusion (10 mg/hr Intravenous New Bag 6/19/23 1108)       Diagnostic Studies  Results Reviewed     Procedure Component Value Units Date/Time    HS Troponin I 2hr [[id number]] Collected: 06/19/23 1301    Lab Status:  In process Specimen: Blood from Arm, Left Updated: 06/19/23 1306    HS Troponin I 4hr [[id number]]     Lab Status: No result Specimen: Blood     CBC and differential [[id number]]  (Abnormal) Collected: 06/19/23 1057    Lab Status: Final result Specimen: Blood from Arm, Left Updated: 06/19/23 1159     WBC 4 88 Thousand/uL      RBC 4 98 Million/uL      Hemoglobin 14 1 g/dL      Hematocrit 41 6 %      MCV 84 fL      MCH 28 3 pg      MCHC 33 9 g/dL      RDW 12 7 %      MPV 13 6 fL      Platelets 544 Thousands/uL      nRBC 0 /100 WBCs      Neutrophils Relative 52 %      Immat GRANS % 0 %      Lymphocytes Relative 34 %      Monocytes Relative 11 %      Eosinophils Relative 3 %      Basophils Relative 0 %      Neutrophils Absolute 2 54 Thousands/µL      Immature Grans Absolute 0 01 Thousand/uL      Lymphocytes Absolute 1 64 Thousands/µL      Monocytes Absolute 0 54 Thousand/µL      Eosinophils Absolute 0 13 Thousand/µL      Basophils Absolute 0 02 Thousands/µL     Basic metabolic panel [[id number]] Collected: 06/19/23 1057    Lab Status: Final result Specimen: Blood from Arm, Left Updated: 06/19/23 1135     Sodium 136 mmol/L      Potassium 4 3 mmol/L      Chloride 105 mmol/L      CO2 27 mmol/L      ANION GAP 4 mmol/L      BUN 13 mg/dL      Creatinine 1 14 mg/dL      Glucose 96 mg/dL      Calcium 9 5 mg/dL      eGFR 70 ml/min/1 73sq m     Narrative:      [place] guidelines for Chronic Kidney Disease (CKD):   •  Stage 1 with normal or high GFR (GFR > 90 mL/min/1 73 square meters)  •  Stage 2 Mild CKD (GFR = 60-89 mL/min/1 73 square meters)  •  Stage 3A Moderate CKD (GFR = 45-59 mL/min/1 73 square meters)  •  Stage 3B Moderate CKD (GFR = 30-44 mL/min/1 73 square meters)  •  Stage 4 Severe CKD (GFR = 15-29 mL/min/1 73 square meters)  •  Stage 5 End Stage CKD (GFR <15 mL/min/1 73 square meters)  Note: GFR calculation is accurate only with a steady state creatinine    HS Troponin 0hr (reflex protocol) [[id number]]  (Normal) Collected: 06/19/23 1057    Lab Status: Final result Specimen: Blood from Arm, Left Updated: 06/19/23 1135     hs TnI 0hr 7 ng/L     Magnesium [[id number]]  (Normal) Collected: 06/19/23 1057    Lab Status: Final result Specimen: Blood from Arm, Left Updated: 06/19/23 1135     Magnesium 2 0 mg/dL                  X-ray chest 1 view portable    (Results Pending)              Procedures  ECG 12 Lead Documentation Only    Date/Time: 6/19/2023 10:48 AM    Performed by: Lenin Chery PA-C  Authorized by: Lenin Chery PA-C    Indications / Diagnosis:  Arrhythmia  ECG reviewed by me, the ED Provider: yes    Patient location:  ED  Previous ECG:     Previous ECG:  Unavailable    Comparison to cardiac monitor: Yes    Interpretation:     Interpretation: normal    Rate:     ECG rate:  104    ECG rate assessment: tachycardic    Rhythm:     Rhythm: atrial flutter    Ectopy:     Ectopy: none    QRS:     QRS axis:  Normal    QRS intervals:  Normal  Conduction:     Conduction: normal    ST segments:     ST segments:  Normal  T waves:     T waves: normal               ED Course  ED Course as of 06/19/23 1351   Mon Jun 19, 2023   1115 Re-evaluation - patient aflutter rate 120s broke down to 70 after cardizem infusion  Will maintain drip for rate control and admit for DAMION and evaluation for cardioversion                  HEART Risk Score    Flowsheet Row Most Recent Value   Heart Score Risk Calculator    History 0 Filed at: 06/19/2023 1350   ECG 1 Filed at: 06/19/2023 1350   Age 1 Filed at: 06/19/2023 1350   Risk Factors 0 Filed at: 06/19/2023 1350   Troponin 0 Filed at: 2023 1350   HEART Score 2 Filed at: 2023 1350                        SBIRT 22yo+    Flowsheet Row Most Recent Value   Initial Alcohol Screen: US AUDIT-C     1  How often do you have a drink containing alcohol? 3 Filed at: 2023 1124   2  How many drinks containing alcohol do you have on a typical day you are drinking? 1 Filed at: 2023 1124   3a  Male UNDER 65: How often do you have five or more drinks on one occasion? 0 Filed at: 2023 1124   Audit-C Score 4 Filed at: 2023 1124   ARTUR: How many times in the past year have you    Used an illegal drug or used a prescription medication for non-medical reasons? Never Filed at: 2023 1124                    Medical Decision Making  ED summary:    Medical decision m[age]-year-old male with history of A-fib status post ablation in  presents to the emergency department on recommendation of cardiologist for new onset atrial flutter  Outpatient cardiologist recommended evaluation and admission for DAMION and possible cardioversion  Differential diagnosis includes but is not limited to cardiac arrhythmia, electrolyte abnormality, anemia, acute coronary syndrome, pleural effusion, pericardial effusion, tinnitus, valvular heart disease  ED plan:  CBC to evaluate for anemia, leukocytosis  Chemistry panel to evaluate for renal failure or electrolyte abnormality  Troponin to evaluate for acute coronary syndrome  EKG to evaluate for arrhythmia or ischemia  Cardiac telemetry to monitor for arrhythmia  Magnesium to evaluate for hypomagnesemia  IV Cardizem bolus for rate control of atrial flutter followed by IV Cardizem drip to maintain rate control    MDM:  I have reviewed the patient's vital signs, nursing notes, and other relevant ancillary testing/information   I have had a detailed discussion with the patient regarding the historical points, examination findings, and any diagnostic results    Results:  -CBC demonstrates normal white blood cell count of 4 8, hemoglobin of 14 1 and hematocrit 41 6 and normal   No anemia  -Metabolic panel reviewed: BUN 13, creatinine 1 1 and normal   No renal failure  Potassium normal 4 3   -0-hour troponin was 7  -Magnesium 2 0, normal  -My ekg interpretation  Rapid atrial flutter, rate 104  No STEMI  -The cardiac monitor revealed rapid aflutter as interpreted by me  The cardiac monitor was ordered secondary to history of afib/flutter and to monitor patient for potential dysrhythmia  -My CXR interpretation  no infiltrate or PTX  ED coarse: Patient underwent a flutter rate controlled with IV Cardizem bolus and drip, shortly after institution of IV medications patient's heart rate was stabilized to 70s to 80s but remained in a flutter with variable AV block  No significant troponin elevation to suggest ischemia, no abnormal findings on chest x-ray  No anemia or lab abnormalities to account for symptoms  Case discussed with Dr Celestino Newsome, cardiology who is agreeable to see patient in consult for evaluation of atrial flutter  Case discussed with Dr Hermelindo Armstrong, [facility] regarding admission  The critical care time is separate of other billable procedures, treating other patients, and any teaching time  The critical care time was for: obtaining history from patient or surrogate, development of treatment plan with patient or surrogate, discussion with any applicable consultants, examination of the patient,ordering and performing treatments and interventions, ordering and reviewing any applicable laboratory or radiographic studies, reassessment of the patient for response to treatment, reviewing any pertinent and available old records, documentation time      The critical care time was necessary to prevent deterioration of the following organ systems: rapid atrial flutter requiring IV cardizem for rate control    Time spent (in minutes):30           Amount and/or Complexity of Data Reviewed  Labs: ordered  Radiology: ordered  Risk  Prescription drug management  Decision regarding hospitalization  Disposition  Final diagnoses:   Atrial flutter ([address] )     Time reflects when diagnosis was documented in both MDM as applicable and the Disposition within this note     Time User Action Codes Description Comment    6/19/2023 10:53 AM Joi Gamble Add [I48 92] Atrial flutter [facility])       ED Disposition     ED Disposition   Admit    Condition   Stable    Date/Time   Mon Jun 19, 2023 12:23 PM    Comment   Case was discussed with Dr Natalie Burnette and the patient's admission status was agreed to be Admission Status: observation status to the service of Dr Natalie Burnette   Follow-up Information    None         Patient's Medications   Discharge Prescriptions    No medications on file       No discharge procedures on file      PDMP Review     None          ED Provider  Electronically Signed by           Rajendra Fu PA-C  06/19/23 4666

## 2023-06-19 NOTE — PROGRESS NOTES
Cardiology Consultation     Bhupendra Keita  [id number]  1965  [facility] Brenda Pickett [address] [place] [id number]    HPI:  Pleasant 60-year-old  originally from [address] [place] [address] who had a history of atrial for fibrillation according to him in 2012 which was cardioverted  Recently he went to see his family doctor because of abdominal discomfort and he was found to have atrial flutter with a rapid ventricular response  He was referred here  He was put on Eliquis at that time  He has only been on it for 48 hours  He has no history of cardiac disease  He is physically active without limitation  He has no history of heart murmur  He does not smoke cigarettes  1  PAF (paroxysmal atrial fibrillation) ([facility] [address] )  -     POCT ECG  -     Ambulatory Referral to Cardiology  -     Echo complete w/ contrast if indicated; Future; Expected date: 06/19/2023  -     TSH, 3rd generation with Free T4 reflex    2  History of cardioversion  -     Ambulatory Referral to Cardiology    3  Atrial flutter, unspecified type ([address] )  -     ECG 12 lead; Future  -     Cardioversion; Future; Expected date: 06/19/2023  -     DAMION; Future; Expected date: 06/19/2023    4   Typical atrial flutter [facility])      Patient Active Problem List   Diagnosis   • PAF (paroxysmal atrial fibrillation) ([facility])   • History of cardioversion   • Typical atrial flutter [facility])     Past Medical History:   Diagnosis Date   • Erectile dysfunction    • Unspecified atrial fibrillation ([address] )     Resolved 6/29/2016      Social History     Socioeconomic History   • Marital status: /Civil Union     Spouse name: Not on file   • Number of children: Not on file   • Years of education: Not on file   • Highest education level: Not on file   Occupational History   • Not on file   Tobacco Use   • Smoking status: Never   • Smokeless tobacco: Never   Vaping Use   • Vaping Use: Never used   Substance and Sexual Activity   • Alcohol use:  Yes     Alcohol/week: 2 0 standard drinks of alcohol     Types: 2 Glasses of wine per week     Comment: ocassionally    • Drug use: No   • Sexual activity: Not on file   Other Topics Concern   • Not on file   Social History Narrative   • Not on file     Social Determinants of Health     Financial Resource Strain: Not on file   Food Insecurity: Not on file   Transportation Needs: Not on file   Physical Activity: Inactive (5/20/2021)    Exercise Vital Sign    • Days of Exercise per Week: 0 days    • Minutes of Exercise per Session: 0 min   Stress: No Stress Concern Present (5/20/2021)    Veronique7 Brandon Barragan    • Feeling of Stress : Not at all   Social Connections: Not on file   Intimate Partner Violence: Not on file   Housing Stability: Not on file      Family History   Problem Relation Age of Onset   • Diabetes Mother    • Diabetes type II Mother    • Hypertension Mother    • Diabetes Father    • Hypertension Family         Ess hypertension      Past Surgical History:   Procedure Laterality Date   • CARDIAC SURGERY      a-fib 2013       Current Outpatient Medications:   •  apixaban (ELIQUIS) 5 mg, Take 1 tablet (5 mg total) by mouth 2 (two) times a day, Disp: 180 tablet, Rfl: 1  •  metroNIDAZOLE (FLAGYL) 500 mg tablet, Take 1 tablet (500 mg total) by mouth every 8 (eight) hours for 7 days, Disp: 21 tablet, Rfl: 0  •  sulfamethoxazole-trimethoprim (BACTRIM DS) 800-160 mg per tablet, Take 1 tablet by mouth 2 (two) times a day for 7 days, Disp: 14 tablet, Rfl: 0  •  sildenafil (VIAGRA) 100 mg tablet, Take 1 tablet (100 mg total) by mouth as needed for erectile dysfunction (Patient not taking: Reported on 6/19/2023), Disp: 30 tablet, Rfl: 2  No Known Allergies  Vitals:    06/19/23 0910   Pulse: 65   Resp: 16   SpO2: 95%   Weight: 98 9 kg (218 lb)   Height: 6' (1 829 m)       Labs:  Appointment on 06/16/2023   Component Date Value   • Sodium 06/17/2023 141    • Potassium 06/17/2023 4 4    • Chloride 06/17/2023 112 (H)    • CO2 06/17/2023 27    • ANION GAP 06/17/2023 2 (L)    • BUN 06/17/2023 15    • Creatinine 06/17/2023 1 01    • Glucose, Fasting 06/17/2023 122 (H)    • Calcium 06/17/2023 9 6    • AST 06/17/2023 27    • ALT 06/17/2023 50    • Alkaline Phosphatase 06/17/2023 65    • Total Protein 06/17/2023 7 6    • Albumin 06/17/2023 3 7    • Total Bilirubin 06/17/2023 0 39    • eGFR 06/17/2023 82    • Cholesterol 06/17/2023 185    • Triglycerides 06/17/2023 202 (H)    • HDL, Direct 06/17/2023 35 (L)    • LDL Calculated 06/17/2023 110 (H)    • WBC 06/17/2023 5 41    • RBC 06/17/2023 5 26    • Hemoglobin 06/17/2023 14 8    • Hematocrit 06/17/2023 45 2    • MCV 06/17/2023 86    • MCH 06/17/2023 28 1    • MCHC 06/17/2023 32 7    • RDW 06/17/2023 13 1    • MPV 06/17/2023 12 3    • Platelets 85/15/9616 212    • nRBC 06/17/2023 0    • Neutrophils Relative 06/17/2023 44    • Immat GRANS % 06/17/2023 0    • Lymphocytes Relative 06/17/2023 42    • Monocytes Relative 06/17/2023 10    • Eosinophils Relative 06/17/2023 3    • Basophils Relative 06/17/2023 1    • Neutrophils Absolute 06/17/2023 2 41    • Immature Grans Absolute 06/17/2023 0 01    • Lymphocytes Absolute 06/17/2023 2 27    • Monocytes Absolute 06/17/2023 0 55    • Eosinophils Absolute 06/17/2023 0 14    • Basophils Absolute 06/17/2023 0 03    • Hepatitis C Ab 06/16/2023 Non-reactive    • PSA 06/17/2023 1 55      Imaging: No results found  Review of Systems:  Review of Systems   Constitutional: Negative for activity change, chills, fatigue and fever  Eyes: Negative for visual disturbance  Respiratory: Negative for cough, chest tightness and shortness of breath  Cardiovascular: Negative for chest pain, palpitations and leg swelling  Gastrointestinal: Negative for abdominal pain          Recently diagnosed as having diverticulitis by primary care doctor and is taking antibiotics   Genitourinary: Negative for frequency  Musculoskeletal: Negative for back pain and gait problem  Skin: Negative for color change and rash  Neurological: Negative for speech difficulty and light-headedness  All other systems reviewed and are negative  Physical Exam:  Well-developed well-nourished  Heart rate 119 and regular  Blood pressure 118/72  Skin warm dry  Pupils are equal   Carotids 2+ without bruits  Lungs clear  Rhythm irregular  There is a grade 2 holosystolic apical murmur  Lungs are clear  No organomegaly  Bowel sounds active  No abdominal tenderness  Good pulses  No edema  EKG shows atrial flutter with a rapid ventricular response    Discussion/Summary:    1  Atrial flutter with a rapid ventricular response  2  Mitral insufficiency  3  Abdominal discomfort diagnosed as diverticulitis-no acute peritoneal signs at present    Recommendations:    1  Cardioversion-probably will respond to low 1 seconds  2   Echocardiogram  3    DAMION to check for possible atrial thrombus  #4 Will refer to ED for admission            Cici Shine MD

## 2023-06-20 ENCOUNTER — APPOINTMENT (OUTPATIENT)
Dept: NON INVASIVE DIAGNOSTICS | Facility: HOSPITAL | Age: 58
End: 2023-06-20
Payer: COMMERCIAL

## 2023-06-20 VITALS
WEIGHT: 218 LBS | HEART RATE: 73 BPM | HEIGHT: 72 IN | SYSTOLIC BLOOD PRESSURE: 116 MMHG | BODY MASS INDEX: 29.53 KG/M2 | DIASTOLIC BLOOD PRESSURE: 77 MMHG | TEMPERATURE: 98.3 F | OXYGEN SATURATION: 95 % | RESPIRATION RATE: 16 BRPM

## 2023-06-20 LAB
ANION GAP SERPL CALCULATED.3IONS-SCNC: 4 MMOL/L (ref 4–13)
AORTIC ROOT: 3.6 CM
APICAL FOUR CHAMBER EJECTION FRACTION: 54 %
ASCENDING AORTA: 3.7 CM
BASOPHILS # BLD AUTO: 0.02 THOUSANDS/ÂΜL (ref 0–0.1)
BASOPHILS NFR BLD AUTO: 0 % (ref 0–1)
BUN SERPL-MCNC: 12 MG/DL (ref 5–25)
CALCIUM SERPL-MCNC: 8.9 MG/DL (ref 8.4–10.2)
CHLORIDE SERPL-SCNC: 106 MMOL/L (ref 96–108)
CO2 SERPL-SCNC: 26 MMOL/L (ref 21–32)
CREAT SERPL-MCNC: 1.14 MG/DL (ref 0.6–1.3)
E WAVE DECELERATION TIME: 229 MS
EOSINOPHIL # BLD AUTO: 0.17 THOUSAND/ÂΜL (ref 0–0.61)
EOSINOPHIL NFR BLD AUTO: 4 % (ref 0–6)
ERYTHROCYTE [DISTWIDTH] IN BLOOD BY AUTOMATED COUNT: 13 % (ref 11.6–15.1)
EST. AVERAGE GLUCOSE BLD GHB EST-MCNC: 128 MG/DL
FRACTIONAL SHORTENING: 37 % (ref 28–44)
GFR SERPL CREATININE-BSD FRML MDRD: 70 ML/MIN/1.73SQ M
GLUCOSE P FAST SERPL-MCNC: 106 MG/DL (ref 65–99)
GLUCOSE SERPL-MCNC: 106 MG/DL (ref 65–140)
HBA1C MFR BLD: 6.1 %
HCT VFR BLD AUTO: 39.2 % (ref 36.5–49.3)
HGB BLD-MCNC: 13.6 G/DL (ref 12–17)
IMM GRANULOCYTES # BLD AUTO: 0.02 THOUSAND/UL (ref 0–0.2)
IMM GRANULOCYTES NFR BLD AUTO: 0 % (ref 0–2)
INTERVENTRICULAR SEPTUM IN DIASTOLE (PARASTERNAL SHORT AXIS VIEW): 1.1 CM
INTERVENTRICULAR SEPTUM: 1.1 CM (ref 0.6–1.1)
LAAS-AP2: 24.8 CM2
LAAS-AP4: 23.4 CM2
LEFT ATRIUM AREA SYSTOLE SINGLE PLANE A4C: 25 CM2
LEFT ATRIUM SIZE: 4.1 CM
LEFT INTERNAL DIMENSION IN SYSTOLE: 3.2 CM (ref 2.1–4)
LEFT VENTRICULAR INTERNAL DIMENSION IN DIASTOLE: 5.1 CM (ref 3.5–6)
LEFT VENTRICULAR POSTERIOR WALL IN END DIASTOLE: 1.1 CM
LEFT VENTRICULAR STROKE VOLUME: 83 ML
LVSV (TEICH): 83 ML
LYMPHOCYTES # BLD AUTO: 1.75 THOUSANDS/ÂΜL (ref 0.6–4.47)
LYMPHOCYTES NFR BLD AUTO: 37 % (ref 14–44)
MCH RBC QN AUTO: 29.2 PG (ref 26.8–34.3)
MCHC RBC AUTO-ENTMCNC: 34.7 G/DL (ref 31.4–37.4)
MCV RBC AUTO: 84 FL (ref 82–98)
MONOCYTES # BLD AUTO: 0.48 THOUSAND/ÂΜL (ref 0.17–1.22)
MONOCYTES NFR BLD AUTO: 10 % (ref 4–12)
MV E'TISSUE VEL-SEP: 11 CM/S
MV PEAK A VEL: 0.32 M/S
MV PEAK E VEL: 76 CM/S
MV STENOSIS PRESSURE HALF TIME: 66 MS
MV VALVE AREA P 1/2 METHOD: 3.33 CM2
NEUTROPHILS # BLD AUTO: 2.34 THOUSANDS/ÂΜL (ref 1.85–7.62)
NEUTS SEG NFR BLD AUTO: 49 % (ref 43–75)
NRBC BLD AUTO-RTO: 0 /100 WBCS
PLATELET # BLD AUTO: 141 THOUSANDS/UL (ref 149–390)
PMV BLD AUTO: 12.5 FL (ref 8.9–12.7)
POTASSIUM SERPL-SCNC: 4.4 MMOL/L (ref 3.5–5.3)
RBC # BLD AUTO: 4.66 MILLION/UL (ref 3.88–5.62)
RIGHT ATRIUM AREA SYSTOLE A4C: 21.7 CM2
RIGHT VENTRICLE ID DIMENSION: 3.9 CM
SL CV LEFT ATRIUM LENGTH A2C: 5.5 CM
SL CV LV EF: 55
SL CV PED ECHO LEFT VENTRICLE DIASTOLIC VOLUME (MOD BIPLANE) 2D: 124 ML
SL CV PED ECHO LEFT VENTRICLE SYSTOLIC VOLUME (MOD BIPLANE) 2D: 41 ML
SODIUM SERPL-SCNC: 136 MMOL/L (ref 135–147)
TR MAX PG: 15 MMHG
TR PEAK VELOCITY: 2 M/S
TRICUSPID ANNULAR PLANE SYSTOLIC EXCURSION: 2.3 CM
TRICUSPID VALVE PEAK REGURGITATION VELOCITY: 1.95 M/S
WBC # BLD AUTO: 4.78 THOUSAND/UL (ref 4.31–10.16)

## 2023-06-20 PROCEDURE — 93306 TTE W/DOPPLER COMPLETE: CPT

## 2023-06-20 PROCEDURE — 85025 COMPLETE CBC W/AUTO DIFF WBC: CPT | Performed by: PHYSICIAN ASSISTANT

## 2023-06-20 PROCEDURE — 99239 HOSP IP/OBS DSCHRG MGMT >30: CPT | Performed by: STUDENT IN AN ORGANIZED HEALTH CARE EDUCATION/TRAINING PROGRAM

## 2023-06-20 PROCEDURE — 99214 OFFICE O/P EST MOD 30 MIN: CPT | Performed by: INTERNAL MEDICINE

## 2023-06-20 PROCEDURE — 80048 BASIC METABOLIC PNL TOTAL CA: CPT | Performed by: PHYSICIAN ASSISTANT

## 2023-06-20 PROCEDURE — 83036 HEMOGLOBIN GLYCOSYLATED A1C: CPT | Performed by: PHYSICIAN ASSISTANT

## 2023-06-20 PROCEDURE — 93306 TTE W/DOPPLER COMPLETE: CPT | Performed by: INTERNAL MEDICINE

## 2023-06-20 RX ORDER — DILTIAZEM HYDROCHLORIDE 120 MG/1
120 CAPSULE, COATED, EXTENDED RELEASE ORAL DAILY
Qty: 30 CAPSULE | Refills: 0 | Status: SHIPPED | OUTPATIENT
Start: 2023-06-21

## 2023-06-20 RX ADMIN — SULFAMETHOXAZOLE AND TRIMETHOPRIM 1 TABLET: 800; 160 TABLET ORAL at 08:50

## 2023-06-20 RX ADMIN — METRONIDAZOLE 500 MG: 500 TABLET ORAL at 06:17

## 2023-06-20 RX ADMIN — APIXABAN 5 MG: 5 TABLET, FILM COATED ORAL at 08:50

## 2023-06-20 RX ADMIN — METRONIDAZOLE 500 MG: 500 TABLET ORAL at 14:01

## 2023-06-20 RX ADMIN — ASPIRIN 81 MG: 81 TABLET, COATED ORAL at 15:50

## 2023-06-20 RX ADMIN — DILTIAZEM HYDROCHLORIDE 120 MG: 120 CAPSULE, COATED, EXTENDED RELEASE ORAL at 08:50

## 2023-06-20 NOTE — PLAN OF CARE
Problem: PAIN - ADULT  Goal: Verbalizes/displays adequate comfort level or baseline comfort level  Description: Interventions:  - Encourage patient to monitor pain and request assistance  - Assess pain using appropriate pain scale  - Administer analgesics based on type and severity of pain and evaluate response  - Implement non-pharmacological measures as appropriate and evaluate response  - Consider cultural and social influences on pain and pain management  - Notify physician/advanced practitioner if interventions unsuccessful or patient reports new pain  Outcome: Progressing     Problem: INFECTION - ADULT  Goal: Absence of fever/infection during neutropenic period  Description: INTERVENTIONS:  - Monitor WBC    Outcome: Progressing     Problem: SAFETY ADULT  Goal: Patient will remain free of falls  Description: INTERVENTIONS:  - Educate patient/family on patient safety including physical limitations  - Instruct patient to call for assistance with activity   - Consult OT/PT to assist with strengthening/mobility   - Keep Call bell within reach  - Keep bed low and locked with side rails adjusted as appropriate  - Keep care items and personal belongings within reach  - Initiate and maintain comfort rounds  - Make Fall Risk Sign visible to staff  - Offer Toileting every 2 Hours, in advance of need  - Initiate/Maintain bed alarm  - Obtain necessary fall risk management equipment: call bell within reach   - Apply yellow socks and bracelet for high fall risk patients  - Consider moving patient to room near nurses station  Outcome: Progressing

## 2023-06-20 NOTE — PROGRESS NOTES
Cardiology Progress Note - Qian Yusuf 63 y o  male MRN: [id number]    Unit/Bed#: -01 Encounter: [id number]      Assessment/Plan:  1  Paroxysmal atrial fibrillation now converted back to sinus rhythm  Started on Cardizem, continue with aspirin  [id number] 0   Echocardiogram ordered and pending  TSH 2 5  Hemoglobin A1c 6 1     2   History of A-fib/flutter ablation in 2012  Patient is stable in the cardiac standpoint for discharge  We will follow-up in the office  Subjective:   Patient seen and examined  No significant events overnight  Im feeling good  Denies chest pain    Objective:     Vitals: Blood pressure 116/77, pulse 73, temperature 98 3 °F (36 8 °C), resp  rate 16, height 6' (1 829 m), weight 98 9 kg (218 lb), SpO2 95 %  , Body mass index is 29 57 kg/m² ,   Orthostatic Blood Pressures    Flowsheet Row Most Recent Value   Blood Pressure 116/77 filed at 06/20/2023 1000   Patient Position - Orthostatic VS Sitting filed at 06/19/2023 1032            Intake/Output Summary (Last 24 hours) at 6/20/2023 1538  Last data filed at 6/20/2023 0900  Gross per 24 hour   Intake 940 ml   Output --   Net 940 ml         Physical Exam:    GEN: Qian Yusuf appears well, alert and oriented x 3, pleasant and cooperative   HEENT: pupils equal, round, and reactive to light; extraocular muscles intact  NECK: supple, no carotid bruits   HEART: regular rhythm, normal S1 and S2, no murmurs, clicks, gallops or rubs   LUNGS: clear to auscultation bilaterally; no wheezes, rales, or rhonchi   ABDOMEN: normal bowel sounds, soft, no tenderness, no distention  EXTREMITIES: peripheral pulses normal; no clubbing, cyanosis, or edema      Medications:      Current Facility-Administered Medications:   •  aspirin (ECOTRIN LOW STRENGTH) EC tablet 81 mg, 81 mg, Oral, Daily, Kasia Gill PA-C  •  diltiazem (CARDIZEM CD) 24 hr capsule 120 mg, 120 mg, Oral, Daily, Mary Beth Haile MD, 120 mg at 06/20/23 0850  • metroNIDAZOLE (FLAGYL) tablet 500 mg, 500 mg, Oral, Q8H [facility], Emery MARKHAM MD, 500 mg at 06/20/23 1401  •  Insert peripheral IV, , , Once **AND** sodium chloride (PF) 0 9 % injection 3 mL, 3 mL, Intravenous, Q1H PRN, Leann Kinney PA-C  •  sulfamethoxazole-trimethoprim (BACTRIM DS) 800-160 mg per tablet 1 tablet, 1 tablet, Oral, BID, Jona MARKHAM MD, 1 tablet at 06/20/23 0850     Labs & Results:    Results from last 7 days   Lab Units 06/19/23  1450 06/19/23  1301 06/19/23  1057   HS TNI 0HR ng/L  --   --  7   HS TNI 2HR ng/L  --  6  --    HS TNI 4HR ng/L 7  --   --    HSTNI D4 ng/L 0  --   --      Results from last 7 days   Lab Units 06/20/23  0427 06/19/23  1057 06/17/23  0119   WBC Thousand/uL 4 78 4 88 5 41   HEMOGLOBIN g/dL 13 6 14 1 14 8   HEMATOCRIT % 39 2 41 6 45 2   PLATELETS Thousands/uL 141* 117* 212     Results from last 7 days   Lab Units 06/17/23  0149   TRIGLYCERIDES mg/dL 202*   HDL mg/dL 35*     Results from last 7 days   Lab Units 06/20/23  0427 06/19/23  1057 06/17/23  0149   POTASSIUM mmol/L 4 4 4 3 4 4   CHLORIDE mmol/L 106 105 112*   CO2 mmol/L 26 27 27   BUN mg/dL 12 13 15   CREATININE mg/dL 1 14 1 14 1 01   CALCIUM mg/dL 8 9 9 5 9 6   ALK PHOS U/L  --   --  65   ALT U/L  --   --  50   AST U/L  --   --  27         Results from last 7 days   Lab Units 06/19/23  1057   MAGNESIUM mg/dL 2 0

## 2023-06-20 NOTE — DISCHARGE INSTR - AVS FIRST PAGE
Recommend close outpatient follow-up with primary care provider in 3 to 5 days of discharge  Recommend outpatient up with cardiology  Recommend outpatient follow-up with primary care provider and/or cardiology for refill of prescriptions for aspirin and Cardizem      STOP TAKING Eliquis You can access the FollowMyHealth Patient Portal offered by [facility] by registering at the following website: [email or web address]. By joining [facility]’s FollowMyHealth portal, you will also be able to view your health information using other applications (apps) compatible with our system.

## 2023-06-20 NOTE — CASE MANAGEMENT
Case Management Assessment & Discharge Planning Note    Patient name Henri Gomez  Location [id number] MRN [id number]  : 1965 Date 2023       Current Admission Date: 2023  Current Admission Diagnosis:PAF (paroxysmal atrial fibrillation) [facility])   Patient Active Problem List    Diagnosis Date Noted   • PAF (paroxysmal atrial fibrillation) ([facility] ) 2023   • History of cardioversion 2023   • Typical atrial flutter ([facility] [facility] ) 2023   • Diverticulosis 2023      LOS (days): 0  Geometric Mean LOS (GMLOS) (days):   Days to GMLOS:     OBJECTIVE:              Current admission status: Observation       Preferred Pharmacy:   [address] [place] [address] [phone] R [phone] R [phone])  [facility]  Phone: [phone] Fax: [phone]    Primary Care Provider: Popeye Rubio DO    Primary Insurance: PAUL SALINAS  Secondary Insurance:     ASSESSMENT:  [place] Proxies    There are no active Health Care Proxies on file  Advance Directives  Does patient have a [address] [place] [address] Avenue?: Yes  Does patient have Advance Directives?: Yes  Primary Contact: Nunu Sanders    [phone]         Readmission Root Cause  30 Day Readmission: No    Patient Information  Admitted from[de-identified] Home  Mental Status: Alert  During Assessment patient was accompanied by: Not accompanied during assessment  Assessment information provided by[de-identified] Patient  Primary Caregiver: Self  Support Systems: Self, Spouse/significant other  [place] of Residence: [place] [address] [facility] you [email or web address] 136 entry access options   Select all that apply : Stairs  Number of steps to enter home : 3  Do the steps have railings?: Yes  Type of Current Residence: [address] [place] [address] home  Upon entering residence, is there a bedroom on the main floor (no further steps)?: No  A bedroom is located on the following floor levels of residence (select all that apply):: 2nd Floor  Upon entering residence, is there a bathroom on the main floor (no further steps)?: Yes  Number of steps to 2nd floor from main floor: One Flight  In the last 12 months, was there a time when you were not able to pay the mortgage or rent on time?: No  In the last 12 months, how many places have you lived?: 1  In the last 12 months, was there a time when you did not have a steady place to sleep or slept in a shelter (including now)?: No  Homeless/housing insecurity resource given?: No  Living Arrangements: Lives w/ Spouse/significant other  Is patient a ?: No    Activities of Daily Living Prior to Admission  Functional Status: Independent  Completes ADLs independently?: Yes  Ambulates independently?: Yes  Does patient use assisted devices?: No  Does patient currently own DME?: No  Does patient have a history of Outpatient Therapy (PT/OT)?: No  Does the patient have a history of Short-Term Rehab?: No  Does patient have a history of HHC?: No  Does patient currently have [facility]?: No         Patient Information Continued  Income Source: Employed  Does patient have prescription coverage?: Yes  Within the past 12 months, you worried that your food would run out before you got the money to buy more : Never true  Within the past 12 months, the food you bought just didn't last and you didn't have money to get more : Never true  Food insecurity resource given?: No  Does patient receive dialysis treatments?: No  Does patient have a history of substance abuse?: No  Does patient have a history of Mental Health Diagnosis?: No         Means of Transportation  In the past 12 months, has lack of transportation kept you from medical appointments or from getting medications?: No  In the past 12 months, has lack of transportation kept you from meetings, work, or from getting things needed for daily living?: No  Was application for public transport provided?: No        DISCHARGE DETAILS:    Discharge planning discussed with[de-identified] Pt at bedside  Freedom of Choice: Yes  Comments - Freedom of Choice: CM met with pt at bedside and introduced self/role  Pt is alert and oriented x3 ablet to make his needs known and encouraged to do so  Pt is independent with all of his ADLs and IADLs  NO CM NEEDS anticipated  CM cotninues to follow    CM contacted family/caregiver?: No- see comments (Pt wife was not in the room)  Were Treatment Team discharge recommendations reviewed with patient/caregiver?: Yes  Did patient/caregiver verbalize understanding of patient care needs?: Yes  Were patient/caregiver advised of the risks associated with not following Treatment Team discharge recommendations?: Yes    Contacts  Reason/Outcome: Continuity of Care, Discharge [address]         Is the patient interested in [facility] at discharge?: No    DME Referral Provided  Referral made for DME?: No    Other Referral/Resources/Interventions Provided:  Interventions: None Indicated  Referral Comments: No referrals made at this time    Would you like to participate in our [address] service program?  : No - Declined    Treatment Team Recommendation: Home  Discharge Destination Plan[de-identified] Home  Transport at Discharge : Family

## 2023-06-20 NOTE — DISCHARGE SUMMARY
[facility]  Discharge- Yan Andujar 1965, 63 y o  male MRN: [id number]  Unit/Bed#: -01 Encounter: [id number]  Primary Care Provider: Roxanne Carrasquillo DO   Date and time admitted to hospital: 6/19/2023 10:34 AM    * PAF (paroxysmal atrial fibrillation) [facility])  Assessment & Plan  67-year-old male patient with past medical history of A-fib with history of cardioversion in the past, was sent in to emergency room for admission by cardiology office due to A-fib RVR  Seems like patient has converted to normal sinus rhythm after being on Cardizem drip  CBC, BMP within normal  Troponins - x3  Echo report noted with normal EF 55 percent  Currently asymptomatic  Currently patient is normal sinus rhythm  CHADS2 Vasc scroe of 0-1  Continue p o  Cardizem  mg daily  Continue Eliquis per cardiology since low CHADS2 Vasc   Patient is being discharged home on aspirin 81 mg daily per cardiology  Current hemodynamically stable for discharge with close outpatient follow-up with primary care provider and primary currently  Patient is agreeable to plan  Diverticulosis  Assessment & Plan  Patient has history of diverticulosis  Started on 7 days of Flagyl, Bactrim for presumed diverticulitis by PCP on 06/16/2023      Medical Problems     Resolved Problems  Date Reviewed: 6/20/2023   None       Discharging Physician / Practitioner: Brian Dumont MD  PCP: Roxanne Carrasquillo DO  Admission Date:   Admission Orders (From admission, onward)     Ordered        06/19/23 1224  Place in Observation  Once                      Discharge Date: 06/20/23    Consultations During Hospital Stay:  · Cardiology    Reason for Admission: A-fib RVR      Hospital Course:   Yan Andujar is a 63 y o  male patient with past medical history of diverticulosis currently on p o  antibiotics by PCP, patient has past medical history of A-fib/a flutter with history of cardioversion in 2012, now again noted with A-fib RVR and patient was seen by PCP and was started on Eliquis and was referred to cardiology on outpatient basis however it was referred to emergency room by cardiology due to A-fib RVR who originally presented to the hospital on 6/19/2023  Patient was seen by cardiology and currently normal sinus rhythm with IV Cardizem drip  [id number] 0-1  Echo report noted with EF of 55%  Cardio recommended to discontinue Eliquis  Started on aspirin 81 daily due to [id number]  Continue Cardizem  mg daily  Cleared by cardiology for discharge  I have recommend close follow-up with primary care provider and primary cardiology outpatient  Currently patient is hemodynamic stable for discharge  Refer to earlier notes for further clarification  Please see above list of diagnoses and related plan for additional information  Condition at Discharge: good    Discharge Day Visit / Exam:   Subjective: Currently in normal sinus rhythm  Patient is in good spirit  Denies chest pain, dyspnea, palpitation, any dizziness  No other events reported  Edwin Plush to go home  Vitals: Blood Pressure: 116/77 (06/20/23 1000)  Pulse: 73 (06/20/23 1000)  Temperature: 98 3 °F (36 8 °C) (06/20/23 0710)  Temp Source: Oral (06/19/23 2310)  Respirations: 16 (06/20/23 0852)  Height: 6' (182 9 cm) (06/20/23 1000)  Weight - Scale: 98 9 kg (218 lb) (06/20/23 1000)  SpO2: 95 % (06/20/23 0852)  Exam:   Physical Exam  Constitutional:       General: He is not in acute distress  Appearance: Normal appearance  He is not ill-appearing or toxic-appearing  HENT:      Head: Normocephalic and atraumatic  Eyes:      Pupils: Pupils are equal, round, and reactive to light  Cardiovascular:      Rate and Rhythm: Normal rate and regular rhythm  Pulses: Normal pulses  Pulmonary:      Effort: Pulmonary effort is normal  No respiratory distress  Breath sounds: Normal breath sounds  No wheezing     Abdominal: General: Bowel sounds are normal  There is no distension  Palpations: Abdomen is soft  Tenderness: There is no abdominal tenderness  Musculoskeletal:      Cervical back: No rigidity  Neurological:      Mental Status: He is alert and oriented to person, place, and time  Psychiatric:         Mood and Affect: Mood normal          Behavior: Behavior normal          Discussion with Family: Updated  (wife) at bedside  Discharge instructions/Information to patient and family:   See after visit summary for information provided to patient and family  Provisions for Follow-Up Care:  See after visit summary for information related to follow-up care and any pertinent home health orders  Disposition:   Home    Planned Readmission:      Discharge Statement:  I spent 35 minutes discharging the patient  This time was spent on the day of discharge  I had direct contact with the patient on the day of discharge  Greater than 50% of the total time was spent examining patient, answering all patient questions, arranging and discussing plan of care with patient as well as directly providing post-discharge instructions  Additional time then spent on discharge activities  Discharge Medications:  See after visit summary for reconciled discharge medications provided to patient and/or family        **Please Note: This note may have been constructed using a voice recognition system**

## 2023-06-20 NOTE — UTILIZATION REVIEW
Initial Clinical Review    Admission: Date/Time/Statement:   Admission Orders (From admission, onward)     Ordered        06/19/23 1224  Place in Observation  Once                      Orders Placed This Encounter   Procedures   • Place in Observation     Standing Status:   Standing     Number of Occurrences:   1     Order Specific Question:   Level of Care     Answer:   Med Surg [16]     ED Arrival Information     Expected   6/19/2023     Arrival   6/19/2023 10:26    Acuity   Emergent            Means of arrival   Walk-In    Escorted by   Spouse    Service   Hospitalist    Admission type   Emergency            Arrival complaint   chest palpitations            Chief Complaint   Patient presents with   • Palpitations     Pt states he was sent here by cardiology for a-flutter  Initial Presentation: 63 y o  male who presented self from Cardiology office to [facility] ED  Admitted in observation status for evaluation and treatment of afib w/ RVR  PMHx: aflutter/afib s/p cardioversion in 2012, ED  Presented w/ racing heart while working out  Started on Eliquis after PCP noted afib  Cardiology office noted pt w/ RVR in office  On exam, tachycardic  Plan: IV cardizem gtt, telemetry, continue current meds  Cardiology consulted        ED Triage Vitals   Temperature Pulse Respirations Blood Pressure SpO2   06/19/23 1032 06/19/23 1032 06/19/23 1032 06/19/23 1032 06/19/23 1032   98 °F (36 7 °C) 66 18 134/84 99 %      Temp Source Heart Rate Source Patient Position - Orthostatic VS BP Location FiO2 (%)   06/19/23 1032 06/19/23 1032 06/19/23 1032 06/19/23 1032 --   Oral Monitor Sitting Left arm       Pain Score       06/19/23 1543       2          Wt Readings from Last 1 Encounters:   06/19/23 98 9 kg (218 lb)     Additional Vital Signs:   Date/Time Temp Pulse Resp BP MAP (mmHg) SpO2 O2 Device   06/20/23 07:10:16 98 3 °F (36 8 °C) 77 -- 100/69 79 99 % --   06/19/23 2310 98 5 °F (36 9 °C) 68 17 119/81 93 95 % None (Room air)   06/19/23 2003 -- -- -- -- -- 94 % None (Room air)   06/19/23 18:57:14 98 7 °F (37 1 °C) 71 16 115/76 89 96 % --   06/19/23 1856 -- -- -- 115/76 -- -- --   06/19/23 15:46:17 98 6 °F (37 °C) 81 -- 108/76 87 95 % --   06/19/23 1500 -- 82 19 113/82 89 94 % --   06/19/23 1445 -- 83 20 105/76 86 97 % --   06/19/23 1430 -- 82 20 106/75 86 97 % --   06/19/23 1415 -- 76 17 106/69 81 97 % --   06/19/23 1400 -- 79 18 100/75 81 96 % --   06/19/23 1345 -- 81 17 105/76 85 96 % --   06/19/23 1330 -- 81 16 107/75 87 95 % --   06/19/23 1315 -- 81 17 108/74 85 94 % --   06/19/23 1245 -- 81 19 112/79 91 96 % --   06/19/23 1215 -- 81 17 110/78 89 96 % --   06/19/23 1200 -- 81 17 112/73 89 96 % --   06/19/23 1145 -- 80 16 119/80 94 96 % --   06/19/23 1130 -- 80 23 Abnormal  115/79 93 98 % --   06/19/23 1115 -- 79 18 116/86 97 97 % --   06/19/23 1100 -- 122 Abnormal  17 126/85 100 99 % --   06/19/23 1045 -- 117 Abnormal  23 Abnormal  136/93 107 97 % --     Pertinent Labs/Diagnostic Test Results:   X-ray chest 1 view portable   Final Result by Usha Olivarez MD (06/19 1350)      No acute cardiopulmonary disease        Findings are stable            Workstation performed: [id number]               Results from last 7 days   Lab Units 06/20/23  0427 06/19/23  1057 06/17/23  0119   WBC Thousand/uL 4 78 4 88 5 41   HEMOGLOBIN g/dL 13 6 14 1 14 8   HEMATOCRIT % 39 2 41 6 45 2   PLATELETS Thousands/uL 141* 117* 212   NEUTROS ABS Thousands/µL 2 34 2 54 2 41         Results from last 7 days   Lab Units 06/20/23  0427 06/19/23  1057 06/17/23  0149   SODIUM mmol/L 136 136 141   POTASSIUM mmol/L 4 4 4 3 4 4   CHLORIDE mmol/L 106 105 112*   CO2 mmol/L 26 27 27   ANION GAP mmol/L 4 4 2*   BUN mg/dL 12 13 15   CREATININE mg/dL 1 14 1 14 1 01   EGFR ml/min/1 73sq m 70 70 82   CALCIUM mg/dL 8 9 9 5 9 6   MAGNESIUM mg/dL  --  2 0  --      Results from last 7 days   Lab Units 06/17/23  0149   AST U/L 27   ALT U/L 50   ALK PHOS U/L 65 TOTAL PROTEIN g/dL 7 6   ALBUMIN g/dL 3 7   TOTAL BILIRUBIN mg/dL 0 39         Results from last 7 days   Lab Units 06/20/23  0427 06/19/23  1057   GLUCOSE RANDOM mg/dL 106 96     Results from last 7 days   Lab Units 06/19/23  1450 06/19/23  1301 06/19/23  1057   HS TNI 0HR ng/L  --   --  7   HS TNI 2HR ng/L  --  6  --    HSTNI D2 ng/L  --  -1  --    HS TNI 4HR ng/L 7  --   --    HSTNI D4 ng/L 0  --   --              Results from last 7 days   Lab Units 06/19/23  1057   TSH 3RD GENERATON uIU/mL 2 589     Results from last 7 days   Lab Units 06/16/23  1659   HEP C AB  Non-reactive         ED Treatment:   Medication Administration from 06/19/2023 1006 to 06/19/2023 1528       Date/Time Order Dose Route Action     06/19/2023 1103 EDT diltiazem (CARDIZEM) injection 10 mg 10 mg Intravenous Given     06/19/2023 1108 EDT diltiazem (CARDIZEM) 125 mg in sodium chloride 0 9 % 125 mL infusion 10 mg/hr Intravenous New Bag        Past Medical History:   Diagnosis Date   • Erectile dysfunction    • Unspecified atrial fibrillation ([facility] )     Resolved 6/29/2016      Present on Admission:  • PAF (paroxysmal atrial fibrillation) ([facility] )      Admitting Diagnosis: Atrial flutter ([facility] ) [I48 [id number]]  Age/Sex: 63 y o  male  Admission Orders:  Regular DIet  Telemetry  Scheduled Medications:  apixaban, 5 mg, Oral, BID  diltiazem, 120 mg, Oral, Daily  metroNIDAZOLE, 500 mg, Oral, Q8H [place]  sulfamethoxazole-trimethoprim, 1 tablet, Oral, BID      Continuous IV Infusions:     PRN Meds:  sodium chloride (PF), 3 mL, Intravenous, Q1H PRN        IP CONSULT TO CARDIOLOGY    Network Utilization Review Department  ATTENTION: Please call with any questions or concerns to [phone] and carefully listen to the prompts so that you are directed to the right person   All voicemails are confidential   Nic Malhotra all requests for admission clinical reviews, approved or denied determinations and any other requests to dedicated fax number below belonging to the [place] where the patient is receiving treatment   List of dedicated fax numbers for the Facilities:  [address] [place] [address] DENIALS (Administrative/Medical Necessity) [phone]   [address]  (Maternity/NICU/Pediatrics) [phone]   [address] [phone]   Janet Toth 77 [phone]   [address] [address] Genoveva Barragan [facility] 28 [phone]   [facility] 134 [facility] [facility] [phone]

## 2023-06-20 NOTE — PLAN OF CARE
Problem: PAIN - ADULT  Goal: Verbalizes/displays adequate comfort level or baseline comfort level  Description: Interventions:  - Encourage patient to monitor pain and request assistance  - Assess pain using appropriate pain scale  - Administer analgesics based on type and severity of pain and evaluate response  - Implement non-pharmacological measures as appropriate and evaluate response  - Consider cultural and social influences on pain and pain management  - Notify physician/advanced practitioner if interventions unsuccessful or patient reports new pain  Outcome: Progressing     Problem: INFECTION - ADULT  Goal: Absence or prevention of progression during hospitalization  Description: INTERVENTIONS:  - Assess and monitor for signs and symptoms of infection  - Monitor lab/diagnostic results  - Monitor all insertion sites, i e  indwelling lines, tubes, and drains  - Monitor endotracheal if appropriate and nasal secretions for changes in amount and color  - [place] appropriate cooling/warming therapies per order  - Administer medications as ordered  - Instruct and encourage patient and family to use good hand hygiene technique  - Identify and instruct in appropriate isolation precautions for identified infection/condition  Outcome: Progressing     Problem: CARDIOVASCULAR - ADULT  Goal: Maintains optimal cardiac output and hemodynamic stability  Description: INTERVENTIONS:  - Monitor I/O, vital signs and rhythm  - Monitor for S/S and trends of decreased cardiac output  - Administer and titrate ordered vasoactive medications to optimize hemodynamic stability  - Assess quality of pulses, skin color and temperature  - Assess for signs of decreased coronary artery perfusion  - Instruct patient to report change in severity of symptoms  Outcome: Progressing

## 2023-06-21 ENCOUNTER — TRANSITIONAL CARE MANAGEMENT (OUTPATIENT)
Age: 58
End: 2023-06-21

## 2023-06-21 LAB
ATRIAL RATE: 312 BPM
ATRIAL RATE: 312 BPM
ATRIAL RATE: 326 BPM
ATRIAL RATE: 326 BPM
ATRIAL RATE: 333 BPM
QRS AXIS: 38 DEGREES
QRS AXIS: 41 DEGREES
QRS AXIS: 45 DEGREES
QRS AXIS: 49 DEGREES
QRS AXIS: 51 DEGREES
QRSD INTERVAL: 106 MS
QRSD INTERVAL: 86 MS
QRSD INTERVAL: 88 MS
QRSD INTERVAL: 90 MS
QRSD INTERVAL: 92 MS
QT INTERVAL: 294 MS
QT INTERVAL: 350 MS
QT INTERVAL: 368 MS
QT INTERVAL: 378 MS
QT INTERVAL: 386 MS
QTC INTERVAL: 386 MS
QTC INTERVAL: 432 MS
QTC INTERVAL: 448 MS
QTC INTERVAL: 457 MS
QTC INTERVAL: 464 MS
T WAVE AXIS: 42 DEGREES
T WAVE AXIS: 42 DEGREES
T WAVE AXIS: 46 DEGREES
T WAVE AXIS: 48 DEGREES
T WAVE AXIS: 61 DEGREES
VENTRICULAR RATE: 104 BPM
VENTRICULAR RATE: 106 BPM
VENTRICULAR RATE: 81 BPM
VENTRICULAR RATE: 83 BPM
VENTRICULAR RATE: 88 BPM

## 2023-06-21 PROCEDURE — 93010 ELECTROCARDIOGRAM REPORT: CPT | Performed by: INTERNAL MEDICINE

## 2023-06-22 ENCOUNTER — OFFICE VISIT (OUTPATIENT)
Dept: GASTROENTEROLOGY | Facility: CLINIC | Age: 58
End: 2023-06-22
Payer: COMMERCIAL

## 2023-06-22 VITALS
WEIGHT: 218.4 LBS | DIASTOLIC BLOOD PRESSURE: 82 MMHG | SYSTOLIC BLOOD PRESSURE: 122 MMHG | HEIGHT: 72 IN | BODY MASS INDEX: 29.58 KG/M2 | HEART RATE: 78 BPM

## 2023-06-22 DIAGNOSIS — R10.84 GENERALIZED ABDOMINAL PAIN: ICD-10-CM

## 2023-06-22 DIAGNOSIS — K57.90 DIVERTICULOSIS: Primary | ICD-10-CM

## 2023-06-22 PROCEDURE — 99214 OFFICE O/P EST MOD 30 MIN: CPT | Performed by: PHYSICIAN ASSISTANT

## 2023-06-22 NOTE — PROGRESS NOTES
[facility]  Guilherme Claros 63 y o  male MRN: [id number]       CC: Diverticulosis    HPI: Mr Nicole Mo is a 56-year-old male with history of atrial fibrillation on Cardizem and baby aspirin, prediabetes, and diverticulosis  Patient presents the office by referral of his PCP as he began having abdominal pain for the last month  Patient reports that when the pain is present, he would rated a 6 out of 10 during his worst severity  Today, he reports that he has no pain  He has no associated change in bowel habits, fevers or chills  He does report abdominal bloating and early satiety  He had a single episode of dark-colored stool  He was just hospitalized for paroxysmal atrial fibrillation this month  Fortunately, is feeling well  Because of his symptoms, his PCP had diagnosed him with diverticulitis  He has had no recent abdominal imaging  Patient's last colonoscopy was with Dr Judith Pablo in 2021 revealing diverticulosis in the splenic flexure, with partially poor prep  He was recommended a 10-year recall  There is no family history of colon cancer to his knowledge  Review of Systems:    CONSTITUTIONAL: Denies any fever, chills, or rigors  Good appetite, and no recent weight loss  HEENT: No earache or tinnitus  Denies hearing loss or visual disturbances  CARDIOVASCULAR: No chest pain or palpitations  RESPIRATORY: Denies any cough, hemoptysis, shortness of breath or dyspnea on exertion  GASTROINTESTINAL: As noted in the History of Present Illness  GENITOURINARY: No problems with urination  Denies any hematuria or dysuria  NEUROLOGIC: No dizziness or vertigo, denies headaches  MUSCULOSKELETAL: Denies any muscle or joint pain  SKIN: Denies skin rashes or itching  ENDOCRINE: Denies excessive thirst  Denies intolerance to heat or cold  PSYCHOSOCIAL: Denies depression or anxiety  Denies any recent memory loss         Current Outpatient Medications   Medication Sig Dispense Refill   • aspirin (ECOTRIN LOW STRENGTH) 81 mg EC tablet Take 1 tablet (81 mg total) by mouth daily 30 tablet 0   • diltiazem (CARDIZEM CD) 120 mg 24 hr capsule Take 1 capsule (120 mg total) by mouth daily Do not start before June 21, 2023  30 capsule 0   • metroNIDAZOLE (FLAGYL) 500 mg tablet Take 1 tablet (500 mg total) by mouth every 8 (eight) hours for 7 days 21 tablet 0   • sulfamethoxazole-trimethoprim (BACTRIM DS) 800-160 mg per tablet Take 1 tablet by mouth 2 (two) times a day for 7 days 14 tablet 0   • sildenafil (VIAGRA) 100 mg tablet Take 1 tablet (100 mg total) by mouth as needed for erectile dysfunction (Patient not taking: Reported on 6/19/2023) 30 tablet 2     No current facility-administered medications for this visit  Past Medical History:   Diagnosis Date   • Diverticulitis    • Erectile dysfunction    • Unspecified atrial fibrillation ([facility] [address] )     Resolved 6/29/2016      Past Surgical History:   Procedure Laterality Date   • CARDIAC SURGERY      a-fib 2013     Social History     Socioeconomic History   • Marital status: /Civil Union     Spouse name: None   • Number of children: None   • Years of education: None   • Highest education level: None   Occupational History   • None   Tobacco Use   • Smoking status: Never   • Smokeless tobacco: Never   Vaping Use   • Vaping Use: Never used   Substance and Sexual Activity   • Alcohol use:  Yes     Alcohol/week: 2 0 standard drinks of alcohol     Types: 2 Glasses of wine per week     Comment: ocassionally    • Drug use: No   • Sexual activity: None   Other Topics Concern   • None   Social History Narrative   • None     Social Determinants of Health     Financial Resource Strain: Not on file   Food Insecurity: No Food Insecurity (6/20/2023)    Hunger Vital Sign    • Worried About Running Out of Food in the Last Year: Never true    • Ran Out of Food in the Last Year: Never true   Transportation Needs: No Transportation Needs (6/20/2023)    PRAPARE - Transportation    • Lack of Transportation (Medical): No    • Lack of Transportation (Non-Medical): No   Physical Activity: Inactive (5/20/2021)    Exercise Vital Sign    • Days of Exercise per Week: 0 days    • Minutes of Exercise per Session: 0 min   Stress: No Stress Concern Present (5/20/2021)    [address]    • Feeling of Stress : Not at all   Social Connections: Not on file   Intimate Partner Violence: Not on file   Housing Stability: Low Risk  (6/20/2023)    Housing Stability Vital Sign    • Unable to Pay for Housing in the Last Year: No    • Number of Places Lived in the Last Year: 1    • Unstable Housing in the Last Year: No     Family History   Problem Relation Age of Onset   • Diabetes Mother    • Diabetes type II Mother    • Hypertension Mother    • Diabetes Father    • Hypertension Family         Ess hypertension             PHYSICAL EXAM:    Vitals:    06/22/23 1527   BP: 122/82   BP Location: Left arm   Patient Position: Sitting   Pulse: 78   Weight: 99 1 kg (218 lb 6 4 oz)   Height: 6' (1 829 m)     General Appearance:   Alert and oriented x 3  Cooperative, and in no respiratory distress   HEENT:   Normocephalic, atraumatic, anicteric       Neck:  Supple, symmetrical, trachea midline   Lungs:   Clear to auscultation bilaterally    Heart[de-identified]   Regular rate and rhythm   Abdomen:   Soft, non-tender, non-distended; normal bowel sounds; no masses, no organomegaly    Genitalia:   Deferred    Rectal:   Deferred    Extremities:  No cyanosis, clubbing or edema    Pulses:  2+ and symmetric all extremities    Skin:  Skin color, texture, turgor normal, no rashes or lesions    Lymph nodes:  No palpable cervical or supraclavicular lymphadenopathy        Lab Results:   Results from last 6 Months   Lab Units 06/20/23  0427   WBC Thousand/uL 4 78   HEMOGLOBIN g/dL 13 6   HEMATOCRIT % 39 2   PLATELETS Thousands/uL 141*   NEUTROS PCT % 49   LYMPHS PCT % 37 "  MONOS PCT % 10   EOS PCT % 4     Results from last 6 Months   Lab Units 06/20/23  0427 06/19/23  1057 06/17/23  0149   POTASSIUM mmol/L 4 4   < > 4 4   CHLORIDE mmol/L 106   < > 112*   CO2 mmol/L 26   < > 27   BUN mg/dL 12   < > 15   CREATININE mg/dL 1 14   < > 1 01   CALCIUM mg/dL 8 9   < > 9 6   ALK PHOS U/L  --   --  65   ALT U/L  --   --  50   AST U/L  --   --  27    < > = values in this interval not displayed  Imaging Studies:   X-ray chest 1 view portable    Result Date: 6/19/2023  Impression: No acute cardiopulmonary disease  Findings are stable Workstation performed: [id number]       ASSESSMENT and PLAN:      1) Generalized abdominal discomfort and bloating, history of diverticulosis - Patient nearing completion of treatment of suspected diverticulitis by PCP with Bactrim  Patient reports that he is doing okay, but still suffering with abdominal bloating and early satiety  He has no previous abdominal imaging  No obvious family history or change in bowel habits  He is nontoxic-appearing on exam   We discussed the difference between diverticulosis and diverticulitis, as well as common presentation of diverticulitis  Recent TSH normal, hemoglobin A1c 6 1   - We will plan for CT abdomen to investigate with IV contrast, marked as urgent  - Further recommendations based on above, we may need to consider gastric emptying study, endoscopy and/or repeat colonoscopy in the future  - For now, I advised him to do a low fiber diet for the next 3 weeks avoiding large amounts of roughage in 1 sitting  We also discussed avoiding gas producing foods such as beans, cauliflower and a large salad  - Patient and patient's wife agree with above plan      Follow up after imaging  Portions of the record may have been created with voice recognition software  Occasional wrong word or \"sound a like\" substitutions may have occurred due to the inherent limitations of voice recognition software    Read the " chart carefully and recognize, using context, where substitutions have occurred

## 2023-06-24 NOTE — ASSESSMENT & PLAN NOTE
71-year-old male patient with past medical history of A-fib with history of cardioversion in the past, was sent in to emergency room for admission by cardiology office due to A-fib RVR  Seems like patient has converted to normal sinus rhythm after being on Cardizem drip  CBC, BMP within normal  Troponins - x3  Currently asymptomatic  CHADS2 Vascor of 0  Continue p o  Cardizem  mg daily  Currently on Eliquis while pending DAMION however likely transition to aspirin after  Cardiology recommendation appreciated  Continue telemetry monitoring

## 2023-06-29 ENCOUNTER — HOSPITAL ENCOUNTER (OUTPATIENT)
Dept: RADIOLOGY | Facility: MEDICAL CENTER | Age: 58
Discharge: HOME | End: 2023-06-29
Payer: COMMERCIAL

## 2023-06-29 DIAGNOSIS — K57.90 DIVERTICULOSIS: ICD-10-CM

## 2023-06-29 DIAGNOSIS — R10.84 GENERALIZED ABDOMINAL PAIN: ICD-10-CM

## 2023-06-29 PROCEDURE — G1004 CDSM NDSC: HCPCS

## 2023-06-29 PROCEDURE — 74177 CT ABD & PELVIS W/CONTRAST: CPT

## 2023-06-29 RX ADMIN — IOHEXOL 100 ML: 350 INJECTION, SOLUTION INTRAVENOUS at 16:10

## 2023-07-11 ENCOUNTER — TELEPHONE (OUTPATIENT)
Dept: SURGERY | Facility: CLINIC | Age: 58
End: 2023-07-11

## 2023-07-11 NOTE — TELEPHONE ENCOUNTER
Message relayed via [email or web address] message:  Please let patient know that his CT scan does not show any abnormal findings in his abdomen other than a large prostate.  He should be seen by urologist. Eleazar Boyce will refer him to 1 if he does not already follow with 1.  Thank you!

## 2023-07-11 NOTE — TELEPHONE ENCOUNTER
----- Message from Jairon Rolle PA-C sent at 7/7/2023  1:36 PM EDT -----  Please let patient know that his CT scan does not show any abnormal findings in his abdomen other than a large prostate. He should be seen by urologist.  We will refer him to 1 if he does not already follow with 1. Thank you!

## 2023-07-28 DIAGNOSIS — I48.0 PAF (PAROXYSMAL ATRIAL FIBRILLATION) (HCC): ICD-10-CM

## 2023-07-28 RX ORDER — DILTIAZEM HYDROCHLORIDE 120 MG/1
120 CAPSULE, COATED, EXTENDED RELEASE ORAL DAILY
Qty: 90 CAPSULE | Refills: 1 | Status: SHIPPED | OUTPATIENT
Start: 2023-07-28

## 2023-07-28 NOTE — TELEPHONE ENCOUNTER
Name of [address] [place] [address]    Call back [phone]    Medication(s):Diltiazem    Are we prescribing provider?:    30 or 90 day supply:    Pharmacy name/number:[id number] or Next appt?:

## 2023-07-31 ENCOUNTER — OFFICE VISIT (OUTPATIENT)
Dept: GASTROENTEROLOGY | Facility: CLINIC | Age: 58
End: 2023-07-31
Payer: COMMERCIAL

## 2023-07-31 VITALS
DIASTOLIC BLOOD PRESSURE: 78 MMHG | BODY MASS INDEX: 28.04 KG/M2 | WEIGHT: 207 LBS | HEART RATE: 78 BPM | SYSTOLIC BLOOD PRESSURE: 124 MMHG | HEIGHT: 72 IN

## 2023-07-31 DIAGNOSIS — N40.0 ENLARGED PROSTATE: ICD-10-CM

## 2023-07-31 DIAGNOSIS — R10.84 GENERALIZED ABDOMINAL PAIN: Primary | ICD-10-CM

## 2023-07-31 DIAGNOSIS — R14.0 ABDOMINAL BLOATING: ICD-10-CM

## 2023-07-31 PROCEDURE — 99214 OFFICE O/P EST MOD 30 MIN: CPT | Performed by: PHYSICIAN ASSISTANT

## 2023-07-31 NOTE — PATIENT INSTRUCTIONS
Scheduled date of EGD(as of today):8/10/23  Physician performing EGD:Alexi  Location of EGD:[place]  Instructions reviewed with patient by:Carolyn WELLINGTON  Clearances:  none

## 2023-07-31 NOTE — H&P (VIEW-ONLY)
[address]  [facility]  [facility] 63 y.o. male MRN: [id number]       CC: Follow-up for generalized abdominal discomfort and bloating    HPI: Mr. Elida Garcia is a 69-year-old male with history of atrial fibrillation on baby aspirin, prediabetes, and diverticulosis. Patient was last seen by me in June. Patient was presenting to GI with abdominal bloating and early satiety. In addition, he was reporting 6 out of 10 abdominal pain. Patient reports that due to his symptoms, his PCP thought it was secondary to diverticulitis and was treated with antibiotics. Patient had CT scan at the end of June revealing no acute abdominal pathology. Diverticulosis was noted without diverticulitis. Large prostate noted. Patient reports that his abdominal pain is subsided, but is still dealing with generalized abdominal bloating. He denies heartburn, regurgitation, nausea or vomiting. His bowel movements are regular. Patient's wife reports that they will be scheduling appoint with urology. Patient's last colonoscopy was with Dr. Swapnil Church in 2021 revealing diverticulosis in the splenic flexure, with partially poor prep. He was recommended a 10-year recall. There is no family history of colon cancer to his knowledge. Review of Systems:    CONSTITUTIONAL: Denies any fever, chills, or rigors. Good appetite, and no recent weight loss. HEENT: No earache or tinnitus. Denies hearing loss or visual disturbances. CARDIOVASCULAR: No chest pain or palpitations. RESPIRATORY: Denies any cough, hemoptysis, shortness of breath or dyspnea on exertion. GASTROINTESTINAL: As noted in the History of Present Illness. GENITOURINARY: No problems with urination. Denies any hematuria or dysuria. NEUROLOGIC: No dizziness or vertigo, denies headaches. MUSCULOSKELETAL: Denies any muscle or joint pain. SKIN: Denies skin rashes or itching.    ENDOCRINE: Denies excessive thirst. Denies intolerance to heat or cold.  PSYCHOSOCIAL: Denies depression or anxiety. Denies any recent memory loss. Current Outpatient Medications   Medication Sig Dispense Refill   • aspirin (ECOTRIN LOW STRENGTH) 81 mg EC tablet Take 1 tablet (81 mg total) by mouth daily 30 tablet 0   • diltiazem (CARDIZEM CD) 120 mg 24 hr capsule Take 1 capsule (120 mg total) by mouth daily 90 capsule 1   • sildenafil (VIAGRA) 100 mg tablet Take 1 tablet (100 mg total) by mouth as needed for erectile dysfunction (Patient not taking: Reported on 6/19/2023) 30 tablet 2     No current facility-administered medications for this visit. Past Medical History:   Diagnosis Date   • Diverticulitis    • Erectile dysfunction    • Unspecified atrial fibrillation ([address])     Resolved 6/29/2016      Past Surgical History:   Procedure Laterality Date   • CARDIAC SURGERY      a-fib 2013     Social History     Socioeconomic History   • Marital status: /Civil Union     Spouse name: None   • Number of children: None   • Years of education: None   • Highest education level: None   Occupational History   • None   Tobacco Use   • Smoking status: Never   • Smokeless tobacco: Never   Vaping Use   • Vaping Use: Never used   Substance and Sexual Activity   • Alcohol use: Yes     Alcohol/week: 2.0 standard drinks of alcohol     Types: 2 Glasses of wine per week     Comment: ocassionally    • Drug use: No   • Sexual activity: None   Other Topics Concern   • None   Social History Narrative   • None     Social Determinants of Health     Financial Resource Strain: Not on file   Food Insecurity: No Food Insecurity (6/20/2023)    Hunger Vital Sign    • Worried About Running Out of Food in the Last Year: Never true    • Ran Out of Food in the Last Year: Never true   Transportation Needs: No Transportation Needs (6/20/2023)    PRAPARE - Transportation    • Lack of Transportation (Medical): No    • Lack of Transportation (Non-Medical):  No   Physical Activity: Inactive (5/20/2021) Exercise Vital Sign    • Days of Exercise per Week: 0 days    • Minutes of Exercise per Session: 0 min   Stress: No Stress Concern Present (5/20/2021)    [address] [facility]    • Feeling of Stress : Not at all   Social Connections: Not on file   Intimate Partner Violence: Not on file   Housing Stability: Low Risk  (6/20/2023)    Housing Stability Vital Sign    • Unable to Pay for Housing in the Last Year: No    • Number of Places Lived in the Last Year: 1    • Unstable Housing in the Last Year: No     Family History   Problem Relation Age of Onset   • Diabetes Mother    • Diabetes type II Mother    • Hypertension Mother    • Diabetes Father    • Hypertension Family         Ess hypertension             PHYSICAL EXAM:    There were no vitals filed for this visit. General Appearance:   Alert and oriented x 3. Cooperative, and in no respiratory distress   HEENT:   Normocephalic, atraumatic, anicteric.      Neck:  Supple, symmetrical, trachea midline   Lungs:   Clear to auscultation bilaterally    Heart[de-identified]   Regular rate and rhythm   Abdomen:   Soft, non-tender, non-distended; normal bowel sounds; no masses, no organomegaly    Genitalia:   Deferred    Rectal:   Deferred    Extremities:  No cyanosis, clubbing or edema    Pulses:  2+ and symmetric all extremities    Skin:  Skin color, texture, turgor normal, no rashes or lesions    Lymph nodes:  No palpable cervical or supraclavicular lymphadenopathy        Lab Results:   Results from last 6 Months   Lab Units 06/20/23  0427   WBC Thousand/uL 4.78   HEMOGLOBIN g/dL 13.6   HEMATOCRIT % 39.2   PLATELETS Thousands/uL 141*   NEUTROS PCT % 49   LYMPHS PCT % 37   MONOS PCT % 10   EOS PCT % 4     Results from last 6 Months   Lab Units 06/20/23  0427 06/19/23  1057 06/17/23  0149   POTASSIUM mmol/L 4.4   < > 4.4   CHLORIDE mmol/L 106   < > 112*   CO2 mmol/L 26   < > 27   BUN mg/dL 12   < > 15   CREATININE mg/dL 1.14   < > 1.01   CALCIUM mg/dL 8.9   < > 9.6   ALK PHOS U/L  --   --  65   ALT U/L  --   --  50   AST U/L  --   --  27    < > = values in this interval not displayed. Imaging Studies:   CT abdomen pelvis w contrast    Result Date: 7/6/2023  Impression: 1. No acute intra-abdominal inflammatory process. 2. Colonic diverticulosis without evidence of acute diverticulitis. 3. Large prostate Workstation performed: [id number]       ASSESSMENT and PLAN:      1) Generalized abdominal discomfort and bloating - Unclear etiology at this time. He has never had an EGD. CT scan without obvious findings to explain his symptoms.  - EGD to investigate, rule out gastritis, H. pylori, less likely celiac disease, or other inflammation in the stomach/duodenum  - If his testing is negative, may benefit from a Xifaxan course  -Patient and patient's wife are agreeable to plan    2) Prostatomegaly - Urology consult. Follow up after EGD. Portions of the record may have been created with voice recognition software. Occasional wrong word or "sound a like" substitutions may have occurred due to the inherent limitations of voice recognition software. Read the chart carefully and recognize, using context, where substitutions have occurred.

## 2023-07-31 NOTE — PROGRESS NOTES
[address] [place] [address] [facility]  Hipolito Nguyen 63 y.o. male MRN: [id number]       CC: Follow-up for generalized abdominal discomfort and bloating    HPI: Mr. Mickie Grossman is a 45-year-old male with history of atrial fibrillation on baby aspirin, prediabetes, and diverticulosis. Patient was last seen by me in June. Patient was presenting to GI with abdominal bloating and early satiety. In addition, he was reporting 6 out of 10 abdominal pain. Patient reports that due to his symptoms, his PCP thought it was secondary to diverticulitis and was treated with antibiotics. Patient had CT scan at the end of June revealing no acute abdominal pathology. Diverticulosis was noted without diverticulitis. Large prostate noted. Patient reports that his abdominal pain is subsided, but is still dealing with generalized abdominal bloating. He denies heartburn, regurgitation, nausea or vomiting. His bowel movements are regular. Patient's wife reports that they will be scheduling appoint with urology. Patient's last colonoscopy was with Dr. Ayanna Lam in 2021 revealing diverticulosis in the splenic flexure, with partially poor prep. He was recommended a 10-year recall. There is no family history of colon cancer to his knowledge. Review of Systems:    CONSTITUTIONAL: Denies any fever, chills, or rigors. Good appetite, and no recent weight loss. HEENT: No earache or tinnitus. Denies hearing loss or visual disturbances. CARDIOVASCULAR: No chest pain or palpitations. RESPIRATORY: Denies any cough, hemoptysis, shortness of breath or dyspnea on exertion. GASTROINTESTINAL: As noted in the History of Present Illness. GENITOURINARY: No problems with urination. Denies any hematuria or dysuria. NEUROLOGIC: No dizziness or vertigo, denies headaches. MUSCULOSKELETAL: Denies any muscle or joint pain. SKIN: Denies skin rashes or itching.    ENDOCRINE: Denies excessive thirst. Denies intolerance to heat or cold.  PSYCHOSOCIAL: Denies depression or anxiety. Denies any recent memory loss. Current Outpatient Medications   Medication Sig Dispense Refill   • aspirin (ECOTRIN LOW STRENGTH) 81 mg EC tablet Take 1 tablet (81 mg total) by mouth daily 30 tablet 0   • diltiazem (CARDIZEM CD) 120 mg 24 hr capsule Take 1 capsule (120 mg total) by mouth daily 90 capsule 1   • sildenafil (VIAGRA) 100 mg tablet Take 1 tablet (100 mg total) by mouth as needed for erectile dysfunction (Patient not taking: Reported on 6/19/2023) 30 tablet 2     No current facility-administered medications for this visit. Past Medical History:   Diagnosis Date   • Diverticulitis    • Erectile dysfunction    • Unspecified atrial fibrillation ([address])     Resolved 6/29/2016      Past Surgical History:   Procedure Laterality Date   • CARDIAC SURGERY      a-fib 2013     Social History     Socioeconomic History   • Marital status: /Civil Union     Spouse name: None   • Number of children: None   • Years of education: None   • Highest education level: None   Occupational History   • None   Tobacco Use   • Smoking status: Never   • Smokeless tobacco: Never   Vaping Use   • Vaping Use: Never used   Substance and Sexual Activity   • Alcohol use: Yes     Alcohol/week: 2.0 standard drinks of alcohol     Types: 2 Glasses of wine per week     Comment: ocassionally    • Drug use: No   • Sexual activity: None   Other Topics Concern   • None   Social History Narrative   • None     Social Determinants of Health     Financial Resource Strain: Not on file   Food Insecurity: No Food Insecurity (6/20/2023)    Hunger Vital Sign    • Worried About Running Out of Food in the Last Year: Never true    • Ran Out of Food in the Last Year: Never true   Transportation Needs: No Transportation Needs (6/20/2023)    PRAPARE - Transportation    • Lack of Transportation (Medical): No    • Lack of Transportation (Non-Medical):  No   Physical Activity: Inactive (5/20/2021) Exercise Vital Sign    • Days of Exercise per Week: 0 days    • Minutes of Exercise per Session: 0 min   Stress: No Stress Concern Present (5/20/2021)    [address] [facility]    • Feeling of Stress : Not at all   Social Connections: Not on file   Intimate Partner Violence: Not on file   Housing Stability: Low Risk  (6/20/2023)    Housing Stability Vital Sign    • Unable to Pay for Housing in the Last Year: No    • Number of Places Lived in the Last Year: 1    • Unstable Housing in the Last Year: No     Family History   Problem Relation Age of Onset   • Diabetes Mother    • Diabetes type II Mother    • Hypertension Mother    • Diabetes Father    • Hypertension Family         Ess hypertension             PHYSICAL EXAM:    There were no vitals filed for this visit. General Appearance:   Alert and oriented x 3. Cooperative, and in no respiratory distress   HEENT:   Normocephalic, atraumatic, anicteric.      Neck:  Supple, symmetrical, trachea midline   Lungs:   Clear to auscultation bilaterally    Heart[de-identified]   Regular rate and rhythm   Abdomen:   Soft, non-tender, non-distended; normal bowel sounds; no masses, no organomegaly    Genitalia:   Deferred    Rectal:   Deferred    Extremities:  No cyanosis, clubbing or edema    Pulses:  2+ and symmetric all extremities    Skin:  Skin color, texture, turgor normal, no rashes or lesions    Lymph nodes:  No palpable cervical or supraclavicular lymphadenopathy        Lab Results:   Results from last 6 Months   Lab Units 06/20/23  0427   WBC Thousand/uL 4.78   HEMOGLOBIN g/dL 13.6   HEMATOCRIT % 39.2   PLATELETS Thousands/uL 141*   NEUTROS PCT % 49   LYMPHS PCT % 37   MONOS PCT % 10   EOS PCT % 4     Results from last 6 Months   Lab Units 06/20/23  0427 06/19/23  1057 06/17/23  0149   POTASSIUM mmol/L 4.4   < > 4.4   CHLORIDE mmol/L 106   < > 112*   CO2 mmol/L 26   < > 27   BUN mg/dL 12   < > 15   CREATININE mg/dL 1.14   < > 1.01   CALCIUM mg/dL 8.9   < > 9.6   ALK PHOS U/L  --   --  65   ALT U/L  --   --  50   AST U/L  --   --  27    < > = values in this interval not displayed. Imaging Studies:   CT abdomen pelvis w contrast    Result Date: 7/6/2023  Impression: 1. No acute intra-abdominal inflammatory process. 2. Colonic diverticulosis without evidence of acute diverticulitis. 3. Large prostate Workstation performed: [id number]       ASSESSMENT and PLAN:      1) Generalized abdominal discomfort and bloating - Unclear etiology at this time. He has never had an EGD. CT scan without obvious findings to explain his symptoms.  - EGD to investigate, rule out gastritis, H. pylori, less likely celiac disease, or other inflammation in the stomach/duodenum  - If his testing is negative, may benefit from a Xifaxan course  -Patient and patient's wife are agreeable to plan    2) Prostatomegaly - Urology consult. Follow up after EGD. Portions of the record may have been created with voice recognition software. Occasional wrong word or "sound a like" substitutions may have occurred due to the inherent limitations of voice recognition software. Read the chart carefully and recognize, using context, where substitutions have occurred.

## 2023-08-03 ENCOUNTER — OFFICE VISIT (OUTPATIENT)
Dept: CARDIOLOGY CLINIC | Facility: CLINIC | Age: 58
End: 2023-08-03
Payer: COMMERCIAL

## 2023-08-03 VITALS
DIASTOLIC BLOOD PRESSURE: 91 MMHG | HEIGHT: 72 IN | OXYGEN SATURATION: 96 % | SYSTOLIC BLOOD PRESSURE: 117 MMHG | BODY MASS INDEX: 28.71 KG/M2 | WEIGHT: 212 LBS | RESPIRATION RATE: 16 BRPM | HEART RATE: 77 BPM

## 2023-08-03 DIAGNOSIS — Z86.79 S/P ABLATION OF ATRIAL FIBRILLATION: ICD-10-CM

## 2023-08-03 DIAGNOSIS — Z98.890 S/P ABLATION OF ATRIAL FIBRILLATION: ICD-10-CM

## 2023-08-03 DIAGNOSIS — I34.0 NONRHEUMATIC MITRAL VALVE REGURGITATION: ICD-10-CM

## 2023-08-03 DIAGNOSIS — I48.92 ATRIAL FLUTTER BY ELECTROCARDIOGRAM (HCC): Primary | ICD-10-CM

## 2023-08-03 PROCEDURE — 93000 ELECTROCARDIOGRAM COMPLETE: CPT | Performed by: INTERNAL MEDICINE

## 2023-08-03 PROCEDURE — 99213 OFFICE O/P EST LOW 20 MIN: CPT | Performed by: INTERNAL MEDICINE

## 2023-08-03 NOTE — PROGRESS NOTES
Cardiology Follow Up    Morris Donovan  1965  [id number]  [facility] Autumn Campbell [address] [place] [address]  [place] [id number] [phone]    1. Atrial flutter by electrocardiogram [facility])  -     Ambulatory referral to Cardiac Electrophysiology; Future    2. S/P ablation of atrial fibrillation    3. Nonrheumatic mitral valve regurgitation          Interval History: 81-year-old heavy  whom I saw in June with atrial flutter. He had a rapid response and I sent him to the hospital and with IV Cardizem he converted. He states that he had an atrial fibrillation ablation at [facility] in 2012. He is healthy and active and without symptoms. He takes 81 mg of aspirin daily. He also takes Cardizem 120 mg long-lasting daily. Patient Active Problem List   Diagnosis   • PAF (paroxysmal atrial fibrillation) ([facility])   • History of cardioversion   • Typical atrial flutter ([facility])   • Diverticulosis   • Atrial flutter by electrocardiogram [facility])   • S/P ablation of atrial fibrillation   • Nonrheumatic mitral valve regurgitation     Past Medical History:   Diagnosis Date   • Diverticulitis    • Erectile dysfunction    • Unspecified atrial fibrillation ([address])     Resolved 6/29/2016      Social History     Socioeconomic History   • Marital status: /Civil Union     Spouse name: Not on file   • Number of children: Not on file   • Years of education: Not on file   • Highest education level: Not on file   Occupational History   • Not on file   Tobacco Use   • Smoking status: Never   • Smokeless tobacco: Never   Vaping Use   • Vaping Use: Never used   Substance and Sexual Activity   • Alcohol use:  Yes     Alcohol/week: 2.0 standard drinks of alcohol     Types: 2 Glasses of wine per week     Comment: ocassionally    • Drug use: No   • Sexual activity: Not on file   Other Topics Concern   • Not on file   Social History Narrative   • Not on file     Social Determinants of Health     Financial Resource Strain: Not on file   Food Insecurity: No Food Insecurity (6/20/2023)    Hunger Vital Sign    • Worried About Running Out of Food in the Last Year: Never true    • Ran Out of Food in the Last Year: Never true   Transportation Needs: No Transportation Needs (6/20/2023)    PRAPARE - Transportation    • Lack of Transportation (Medical): No    • Lack of Transportation (Non-Medical):  No   Physical Activity: Inactive (5/20/2021)    Exercise Vital Sign    • Days of Exercise per Week: 0 days    • Minutes of Exercise per Session: 0 min   Stress: No Stress Concern Present (5/20/2021)    [facility]    • Feeling of Stress : Not at all   Social Connections: Not on file   Intimate Partner Violence: Not on file   Housing Stability: Low Risk  (6/20/2023)    Housing Stability Vital Sign    • Unable to Pay for Housing in the Last Year: No    • Number of Places Lived in the Last Year: 1    • Unstable Housing in the Last Year: No      Family History   Problem Relation Age of Onset   • Diabetes Mother    • Diabetes type II Mother    • Hypertension Mother    • Diabetes Father    • Hypertension Family         Ess hypertension      Past Surgical History:   Procedure Laterality Date   • CARDIAC SURGERY      a-fib 2013       Current Outpatient Medications:   •  aspirin (ECOTRIN LOW STRENGTH) 81 mg EC tablet, Take 1 tablet (81 mg total) by mouth daily, Disp: 30 tablet, Rfl: 0  •  diltiazem (CARDIZEM CD) 120 mg 24 hr capsule, Take 1 capsule (120 mg total) by mouth daily, Disp: 90 capsule, Rfl: 1  •  sildenafil (VIAGRA) 100 mg tablet, Take 1 tablet (100 mg total) by mouth as needed for erectile dysfunction (Patient not taking: Reported on 6/19/2023), Disp: 30 tablet, Rfl: 2  No Known Allergies    Labs:  Admission on 06/19/2023, Discharged on 06/20/2023   Component Date Value   • WBC 06/19/2023 4.88 • RBC 06/19/2023 4.98    • Hemoglobin 06/19/2023 14.1    • Hematocrit 06/19/2023 41.6    • MCV 06/19/2023 84    • MCH 06/19/2023 28.3    • MCHC 06/19/2023 33.9    • RDW 06/19/2023 12.7    • MPV 06/19/2023 13.6 (H)    • Platelets 20/61/5021 117 (L)    • nRBC 06/19/2023 0    • Neutrophils Relative 06/19/2023 52    • Immat GRANS % 06/19/2023 0    • Lymphocytes Relative 06/19/2023 34    • Monocytes Relative 06/19/2023 11    • Eosinophils Relative 06/19/2023 3    • Basophils Relative 06/19/2023 0    • Neutrophils Absolute 06/19/2023 2.54    • Immature Grans Absolute 06/19/2023 0.01    • Lymphocytes Absolute 06/19/2023 1.64    • Monocytes Absolute 06/19/2023 0.54    • Eosinophils Absolute 06/19/2023 0.13    • Basophils Absolute 06/19/2023 0.02    • Sodium 06/19/2023 136    • Potassium 06/19/2023 4.3    • Chloride 06/19/2023 105    • CO2 06/19/2023 27    • ANION GAP 06/19/2023 4    • BUN 06/19/2023 13    • Creatinine 06/19/2023 1.14    • Glucose 06/19/2023 96    • Calcium 06/19/2023 9.5    • eGFR 06/19/2023 70    • hs TnI 0hr 06/19/2023 7    • Magnesium 06/19/2023 2.0    • hs TnI 2hr 06/19/2023 6    • Delta 2hr hsTnI 06/19/2023 -1    • hs TnI 4hr 06/19/2023 7    • Delta 4hr hsTnI 06/19/2023 0    • LV EF 06/20/2023 55    • TSH 3RD GENERATON 06/19/2023 2.589    • Hemoglobin A1C 06/20/2023 6.1 (H)    • EAG 06/20/2023 128    • WBC 06/20/2023 4.78    • RBC 06/20/2023 4.66    • Hemoglobin 06/20/2023 13.6    • Hematocrit 06/20/2023 39.2    • MCV 06/20/2023 84    • MCH 06/20/2023 29.2    • MCHC 06/20/2023 34.7    • RDW 06/20/2023 13.0    • MPV 06/20/2023 12.5    • Platelets 14/16/5412 141 (L)    • nRBC 06/20/2023 0    • Neutrophils Relative 06/20/2023 49    • Immat GRANS % 06/20/2023 0    • Lymphocytes Relative 06/20/2023 37    • Monocytes Relative 06/20/2023 10    • Eosinophils Relative 06/20/2023 4    • Basophils Relative 06/20/2023 0    • Neutrophils Absolute 06/20/2023 2.34    • Immature Grans Absolute 06/20/2023 0.02    • Lymphocytes Absolute 06/20/2023 1.75    • Monocytes Absolute 06/20/2023 0.48    • Eosinophils Absolute 06/20/2023 0.17    • Basophils Absolute 06/20/2023 0.02    • Sodium 06/20/2023 136    • Potassium 06/20/2023 4.4    • Chloride 06/20/2023 106    • CO2 06/20/2023 26    • ANION GAP 06/20/2023 4    • BUN 06/20/2023 12    • Creatinine 06/20/2023 1.14    • Glucose 06/20/2023 106    • Glucose, Fasting 06/20/2023 106 (H)    • Calcium 06/20/2023 8.9    • eGFR 06/20/2023 70    • LA size 06/20/2023 4.1    • Triscuspid Valve Regurgi* 06/20/2023 15.0    • Tricuspid valve peak reg* 06/20/2023 1.95    • LVPWd 06/20/2023 1.10    • Left Atrium Area-systoli* 06/20/2023 24.8    • Left Atrium Area-systoli* 06/20/2023 23.4    • MV E' Tissue Velocity Se* 06/20/2023 11    • Tricuspid annular plane * 06/20/2023 2.30    • TR Peak Yakov 06/20/2023 2.0    • IVSd 06/20/2023 5.81    • LV DIASTOLIC VOLUME (MOD* 12/60/6435 124    • LEFT VENTRICLE SYSTOLIC * 01/66/4116 41    • Left ventricular stroke * 06/20/2023 83.00    • A4C EF 06/20/2023 54    • LA length (A2C) 06/20/2023 5.50    • LVIDd 06/20/2023 5.10    • IVS 06/20/2023 1.1    • LVIDS 06/20/2023 3.20    • FS 06/20/2023 37    • Asc Ao 06/20/2023 3.7    • Ao root 06/20/2023 3.60    • RVID d 06/20/2023 3.9    • MV valve area p 1/2 meth* 06/20/2023 3.33    • E wave deceleration time 06/20/2023 229    • MV Peak E Yakov 06/20/2023 76    • MV Peak A Yakov 06/20/2023 0.32    • KAREN A4C 06/20/2023 25    • RAA A4C 06/20/2023 21.7    • MV stenosis pressure 1/2* 06/20/2023 66    • LVSV, 2D 06/20/2023 83    • Ventricular Rate 06/19/2023 106    • Atrial Rate 06/19/2023 312    • QRSD Interval 06/19/2023 92    • QT Interval 06/19/2023 350    • QTC Interval 06/19/2023 464    • QRS Axis 06/19/2023 51    • T Wave Axis 06/19/2023 61    • Ventricular Rate 06/19/2023 104    • Atrial Rate 06/19/2023 312    • QRSD Interval 06/19/2023 86    • QT Interval 06/19/2023 294    • QTC Interval 06/19/2023 386    • QRS Axis 06/19/2023 49    • T Wave Axis 06/19/2023 42    • Ventricular Rate 06/19/2023 88    • Atrial Rate 06/19/2023 326    • QRSD Interval 06/19/2023 90    • QT Interval 06/19/2023 378    • QTC Interval 06/19/2023 457    • QRS Axis 06/19/2023 38    • T Wave Axis 06/19/2023 48    • Ventricular Rate 06/19/2023 83    • Atrial Rate 06/19/2023 326    • QRSD Interval 06/19/2023 88    • QT Interval 06/19/2023 368    • QTC Interval 06/19/2023 432    • QRS Axis 06/19/2023 45    • T Wave Axis 06/19/2023 42    • Ventricular Rate 06/19/2023 81    • Atrial Rate 06/19/2023 333    • QRSD Interval 06/19/2023 106    • QT Interval 06/19/2023 386    • QTC Interval 06/19/2023 448    • QRS Axis 06/19/2023 41    • T Wave Axis 06/19/2023 46    Appointment on 06/16/2023   Component Date Value   • Sodium 06/17/2023 141    • Potassium 06/17/2023 4.4    • Chloride 06/17/2023 112 (H)    • CO2 06/17/2023 27    • ANION GAP 06/17/2023 2 (L)    • BUN 06/17/2023 15    • Creatinine 06/17/2023 1.01    • Glucose, Fasting 06/17/2023 122 (H)    • Calcium 06/17/2023 9.6    • AST 06/17/2023 27    • ALT 06/17/2023 50    • Alkaline Phosphatase 06/17/2023 65    • Total Protein 06/17/2023 7.6    • Albumin 06/17/2023 3.7    • Total Bilirubin 06/17/2023 0.39    • eGFR 06/17/2023 82    • Cholesterol 06/17/2023 185    • Triglycerides 06/17/2023 202 (H)    • HDL, Direct 06/17/2023 35 (L)    • LDL Calculated 06/17/2023 110 (H)    • WBC 06/17/2023 5.41    • RBC 06/17/2023 5.26    • Hemoglobin 06/17/2023 14.8    • Hematocrit 06/17/2023 45.2    • MCV 06/17/2023 86    • MCH 06/17/2023 28.1    • MCHC 06/17/2023 32.7    • RDW 06/17/2023 13.1    • MPV 06/17/2023 12.3    • Platelets 30/32/3695 212    • nRBC 06/17/2023 0    • Neutrophils Relative 06/17/2023 44    • Immat GRANS % 06/17/2023 0    • Lymphocytes Relative 06/17/2023 42    • Monocytes Relative 06/17/2023 10    • Eosinophils Relative 06/17/2023 3    • Basophils Relative 06/17/2023 1    • Neutrophils Absolute 06/17/2023 2.41    • Immature Grans Absolute 06/17/2023 0.01    • Lymphocytes Absolute 06/17/2023 2.27    • Monocytes Absolute 06/17/2023 0.55    • Eosinophils Absolute 06/17/2023 0.14    • Basophils Absolute 06/17/2023 0.03    • Hepatitis C Ab 06/16/2023 Non-reactive    • PSA 06/17/2023 1.55      Imaging: No results found. Review of Systems:  Review of Systems    Physical Exam:  Well-developed well-nourished. Blood pressure 117/85. Heart rate 85 and irregular. Lungs clear. .  No carotid bruits. Holosystolic apical murmur grade 2. No organomegaly. No edema. EKG shows atrial flutter with a controlled ventricular response    Discussion/Summary:    1. Atrial flutter  2. Reported history of atrial fibrillation ablation  3. Mild mitral regurgitation    Recommendations:    1. Continue current medications  2. Refer to EP for possible flutter ablation  3.   Follow-up afterwards      Elida Anedrson MD

## 2023-08-07 ENCOUNTER — TELEPHONE (OUTPATIENT)
Dept: GASTROENTEROLOGY | Facility: CLINIC | Age: 58
End: 2023-08-07

## 2023-08-09 RX ORDER — SODIUM CHLORIDE, SODIUM LACTATE, POTASSIUM CHLORIDE, CALCIUM CHLORIDE 600; 310; 30; 20 MG/100ML; MG/100ML; MG/100ML; MG/100ML
125 INJECTION, SOLUTION INTRAVENOUS CONTINUOUS
Status: CANCELLED | OUTPATIENT
Start: 2023-08-09

## 2023-08-10 ENCOUNTER — ANESTHESIA (OUTPATIENT)
Dept: GASTROENTEROLOGY | Facility: HOSPITAL | Age: 58
End: 2023-08-10

## 2023-08-10 ENCOUNTER — HOSPITAL ENCOUNTER (OUTPATIENT)
Dept: GASTROENTEROLOGY | Facility: HOSPITAL | Age: 58
Setting detail: OUTPATIENT SURGERY
Discharge: HOME | End: 2023-08-10
Payer: COMMERCIAL

## 2023-08-10 ENCOUNTER — ANESTHESIA EVENT (OUTPATIENT)
Dept: GASTROENTEROLOGY | Facility: HOSPITAL | Age: 58
End: 2023-08-10

## 2023-08-10 VITALS
HEART RATE: 73 BPM | OXYGEN SATURATION: 97 % | WEIGHT: 211.2 LBS | BODY MASS INDEX: 28.61 KG/M2 | TEMPERATURE: 97.7 F | DIASTOLIC BLOOD PRESSURE: 69 MMHG | SYSTOLIC BLOOD PRESSURE: 111 MMHG | HEIGHT: 72 IN | RESPIRATION RATE: 19 BRPM

## 2023-08-10 DIAGNOSIS — R14.0 ABDOMINAL BLOATING: ICD-10-CM

## 2023-08-10 DIAGNOSIS — R10.84 GENERALIZED ABDOMINAL PAIN: ICD-10-CM

## 2023-08-10 PROCEDURE — 43239 EGD BIOPSY SINGLE/MULTIPLE: CPT | Performed by: INTERNAL MEDICINE

## 2023-08-10 PROCEDURE — 88305 TISSUE EXAM BY PATHOLOGIST: CPT | Performed by: PATHOLOGY

## 2023-08-10 PROCEDURE — 88341 IMHCHEM/IMCYTCHM EA ADD ANTB: CPT | Performed by: PATHOLOGY

## 2023-08-10 PROCEDURE — 88342 IMHCHEM/IMCYTCHM 1ST ANTB: CPT | Performed by: PATHOLOGY

## 2023-08-10 RX ORDER — LIDOCAINE HYDROCHLORIDE 20 MG/ML
INJECTION, SOLUTION EPIDURAL; INFILTRATION; INTRACAUDAL; PERINEURAL AS NEEDED
Status: DISCONTINUED | OUTPATIENT
Start: 2023-08-10 | End: 2023-08-10

## 2023-08-10 RX ORDER — PROPOFOL 10 MG/ML
INJECTION, EMULSION INTRAVENOUS AS NEEDED
Status: DISCONTINUED | OUTPATIENT
Start: 2023-08-10 | End: 2023-08-10

## 2023-08-10 RX ORDER — SODIUM CHLORIDE, SODIUM LACTATE, POTASSIUM CHLORIDE, CALCIUM CHLORIDE 600; 310; 30; 20 MG/100ML; MG/100ML; MG/100ML; MG/100ML
INJECTION, SOLUTION INTRAVENOUS CONTINUOUS PRN
Status: DISCONTINUED | OUTPATIENT
Start: 2023-08-10 | End: 2023-08-10

## 2023-08-10 RX ORDER — PANTOPRAZOLE SODIUM 40 MG/1
40 TABLET, DELAYED RELEASE ORAL DAILY
Qty: 30 TABLET | Refills: 2 | Status: SHIPPED | OUTPATIENT
Start: 2023-08-10

## 2023-08-10 RX ADMIN — SODIUM CHLORIDE, SODIUM LACTATE, POTASSIUM CHLORIDE, AND CALCIUM CHLORIDE: .6; .31; .03; .02 INJECTION, SOLUTION INTRAVENOUS at 08:14

## 2023-08-10 RX ADMIN — PROPOFOL 170 MG: 10 INJECTION, EMULSION INTRAVENOUS at 08:19

## 2023-08-10 RX ADMIN — LIDOCAINE HYDROCHLORIDE 50 MG: 20 INJECTION, SOLUTION EPIDURAL; INFILTRATION; INTRACAUDAL; PERINEURAL at 08:19

## 2023-08-10 NOTE — ANESTHESIA PREPROCEDURE EVALUATION
Procedure:  EGD    Relevant Problems   ANESTHESIA (within normal limits)   (-) History of anesthesia complications      CARDIO   (+) Nonrheumatic mitral valve regurgitation   (+) PAF (paroxysmal atrial fibrillation) ([facility]) (s/p ablation)   (+) Typical atrial flutter (H[facility]) (Recurred after ablation for Afib, planning for DCCV)      ENDO (within normal limits)      GI/HEPATIC  Confirmed NPO appropriate      /RENAL (within normal limits)      HEMATOLOGY  On AC but held for this procedure      MUSCULOSKELETAL (within normal limits)      NEURO/PSYCH (within normal limits)      PULMONARY   (-) Smoking   (-) URI (upper respiratory infection)      Other   (+) S/P ablation of atrial fibrillation        Physical Exam    Airway    Mallampati score: II  TM Distance: >3 FB  Neck ROM: full     Dental       Cardiovascular  Rhythm: regular, Rate: normal,     Pulmonary  Breath sounds clear to auscultation,     Other Findings        Anesthesia Plan  ASA Score- 2     Anesthesia Type- IV sedation with anesthesia with ASA Monitors. Additional Monitors:   Airway Plan:     Comment: I discussed the risks and benefits of IV sedation anesthesia including the possibility of the need to convert to general anesthesia and the potential risk of awareness. The patient was given the opportunity to ask questions, which were answered. .       Plan Factors-Exercise tolerance (METS): >4 METS. Chart reviewed. EKG reviewed. Patient is not a current smoker. Induction- intravenous. Postoperative Plan-     Informed Consent- Anesthetic plan and risks discussed with patient. I personally reviewed this patient with the CRNA. Discussed and agreed on the Anesthesia Plan with the CRNA. .        TTE 6/20/23:  Interpretation Summary       •  Left Ventricle: Left ventricular cavity size is normal. Wall thickness is mildly increased. The left ventricular ejection fraction is 55%.  Systolic function is normal. Wall motion is normal. Diastolic function is normal.  •  Left Atrium: The atrium is mildly dilated. •  Right Atrium: The atrium is mildly dilated. •  Mitral Valve: There is annular calcification. There is mild regurgitation.

## 2023-08-10 NOTE — INTERVAL H&P NOTE
H&P reviewed. After examining the patient I find no changes in the patients condition since the H&P had been written.     Vitals:    08/10/23 0716   BP: 145/86   Pulse: 78   Resp: 22   Temp: (!) 97.4 °F (36.3 °C)   SpO2: 99%

## 2023-08-10 NOTE — ANESTHESIA POSTPROCEDURE EVALUATION
Post-Op Assessment Note    CV Status:  Stable  Pain Score: 0    Pain management: adequate     Mental Status:  Sleepy and arousable   PONV Controlled:  None   Airway Patency:  Patent      Post Op Vitals Reviewed: Yes      Staff: Anesthesiologist, CRNA         No notable events documented.     BP   118/74   Temp   97.7   Pulse  79   Resp   15   SpO2   97% at rest on RA

## 2023-08-16 ENCOUNTER — TELEPHONE (OUTPATIENT)
Dept: GASTROENTEROLOGY | Facility: CLINIC | Age: 58
End: 2023-08-16

## 2023-08-16 DIAGNOSIS — K29.70 HELICOBACTER POSITIVE GASTRITIS: Primary | ICD-10-CM

## 2023-08-16 DIAGNOSIS — B96.81 HELICOBACTER POSITIVE GASTRITIS: Primary | ICD-10-CM

## 2023-08-16 PROCEDURE — 88342 IMHCHEM/IMCYTCHM 1ST ANTB: CPT | Performed by: PATHOLOGY

## 2023-08-16 PROCEDURE — 88341 IMHCHEM/IMCYTCHM EA ADD ANTB: CPT | Performed by: PATHOLOGY

## 2023-08-16 PROCEDURE — 88305 TISSUE EXAM BY PATHOLOGIST: CPT | Performed by: PATHOLOGY

## 2023-08-16 RX ORDER — METRONIDAZOLE 250 MG/1
250 TABLET ORAL EVERY 8 HOURS SCHEDULED
Qty: 30 TABLET | Refills: 0 | Status: SHIPPED | OUTPATIENT
Start: 2023-08-16 | End: 2023-08-26

## 2023-08-16 RX ORDER — TETRACYCLINE HYDROCHLORIDE 500 MG/1
500 CAPSULE ORAL 4 TIMES DAILY
Qty: 40 CAPSULE | Refills: 0 | Status: SHIPPED | OUTPATIENT
Start: 2023-08-16 | End: 2023-08-26

## 2023-09-01 DIAGNOSIS — R10.84 GENERALIZED ABDOMINAL PAIN: ICD-10-CM

## 2023-09-01 RX ORDER — PANTOPRAZOLE SODIUM 40 MG/1
40 TABLET, DELAYED RELEASE ORAL DAILY
Qty: 90 TABLET | Refills: 1 | Status: SHIPPED | OUTPATIENT
Start: 2023-09-01

## 2023-09-13 ENCOUNTER — OFFICE VISIT (OUTPATIENT)
Dept: UROLOGY | Facility: CLINIC | Age: 58
End: 2023-09-13
Payer: COMMERCIAL

## 2023-09-13 VITALS
HEART RATE: 82 BPM | DIASTOLIC BLOOD PRESSURE: 86 MMHG | OXYGEN SATURATION: 98 % | WEIGHT: 212.2 LBS | HEIGHT: 72 IN | SYSTOLIC BLOOD PRESSURE: 120 MMHG | BODY MASS INDEX: 28.74 KG/M2

## 2023-09-13 DIAGNOSIS — N40.0 BENIGN PROSTATIC HYPERPLASIA WITHOUT LOWER URINARY TRACT SYMPTOMS: ICD-10-CM

## 2023-09-13 DIAGNOSIS — N52.8 OTHER MALE ERECTILE DYSFUNCTION: Primary | ICD-10-CM

## 2023-09-13 DIAGNOSIS — Z12.5 PROSTATE CANCER SCREENING: ICD-10-CM

## 2023-09-13 LAB
POST-VOID RESIDUAL VOLUME, ML POC: 7 ML
SL AMB  POCT GLUCOSE, UA: NORMAL
SL AMB LEUKOCYTE ESTERASE,UA: NORMAL
SL AMB POCT BILIRUBIN,UA: NORMAL
SL AMB POCT BLOOD,UA: NORMAL
SL AMB POCT CLARITY,UA: CLEAR
SL AMB POCT COLOR,UA: YELLOW
SL AMB POCT KETONES,UA: NORMAL
SL AMB POCT NITRITE,UA: NORMAL
SL AMB POCT PH,UA: 6
SL AMB POCT SPECIFIC GRAVITY,UA: 1.03
SL AMB POCT URINE PROTEIN: NORMAL
SL AMB POCT UROBILINOGEN: 0.2

## 2023-09-13 PROCEDURE — 99214 OFFICE O/P EST MOD 30 MIN: CPT | Performed by: PHYSICIAN ASSISTANT

## 2023-09-13 PROCEDURE — 81002 URINALYSIS NONAUTO W/O SCOPE: CPT | Performed by: PHYSICIAN ASSISTANT

## 2023-09-13 PROCEDURE — 51798 US URINE CAPACITY MEASURE: CPT | Performed by: PHYSICIAN ASSISTANT

## 2023-09-13 NOTE — PROGRESS NOTES
9/13/2023      Chief Complaint   Patient presents with   • Follow-up     Assessment and Plan    1. ED   - Failed oral PDE-5 inhibitors  - Interested in [facility]. Return for [facility] teaching     2. Prostate cancer screening  - PSA from 6/17/23 was 1.55  - SHILPA negative    3. History of low testosterone  - Managed on Androgel many years ago. - Most recent testosterone from 2021 was normal at 392 off TRT  - Denies low libido today    4. BPH   - Noted to have enlarged prostate on CT from 6/29/23  - Asymptomatic.   - PVR=7 mL     History of Present Illness  Cayetano Coburn is a 63 y.o. male here for follow up evaluation of ED and prostate cancer screening. Was last seen in 2021. Was managed on Sildenafil 100 mg PRN at that time. He reports oral agents have been ineffective for ED. Reports issues with maintaining erections. He was previously managed with intracavernosal injections with outside urologist however reports he was never taught how to perform these injections and therefore did not pursue this treatment. He also has reported history of low testosterone. He reports he was treated with AndroGel by outside urologist several years ago. Most recent testosterone was normal in 2021. He denies any low libido or symptoms of low testosterone currently. He was advised to follow up with our office again due to enlarged prostate noted on recent CT completed earlier this year. Recent PSA normal at 1.55. He denies any bothersome urinary symptoms. Review of Systems   Constitutional: Negative for chills and fever. Respiratory: Negative for shortness of breath. Cardiovascular: Negative for chest pain. Gastrointestinal: Negative for abdominal pain. Genitourinary: Negative for difficulty urinating, dysuria, flank pain, frequency, hematuria and urgency. Neurological: Negative for dizziness.        Past Medical History  Past Medical History:   Diagnosis Date   • Benign prostatic hyperplasia    • Diverticulitis • Erectile dysfunction    • Low testosterone    • Unspecified atrial fibrillation ([address])     Resolved 6/29/2016        Past Social History  Past Surgical History:   Procedure Laterality Date   • CARDIAC SURGERY      a-fib 2013   • COLONOSCOPY       Social History     Tobacco Use   Smoking Status Never   Smokeless Tobacco Never       Past Family History  Family History   Problem Relation Age of Onset   • Diabetes Mother    • Diabetes type II Mother    • Hypertension Mother    • Diabetes Father    • Hypertension Family         Ess hypertension        Past Social history  Social History     Socioeconomic History   • Marital status: /Civil Union     Spouse name: Not on file   • Number of children: Not on file   • Years of education: Not on file   • Highest education level: Not on file   Occupational History   • Not on file   Tobacco Use   • Smoking status: Never   • Smokeless tobacco: Never   Vaping Use   • Vaping Use: Never used   Substance and Sexual Activity   • Alcohol use: Yes     Alcohol/week: 2.0 standard drinks of alcohol     Types: 2 Glasses of wine per week     Comment: ocassionally    • Drug use: No   • Sexual activity: Not on file   Other Topics Concern   • Not on file   Social History Narrative   • Not on file     Social Determinants of Health     Financial Resource Strain: Not on file   Food Insecurity: No Food Insecurity (6/20/2023)    Hunger Vital Sign    • Worried About Running Out of Food in the Last Year: Never true    • Ran Out of Food in the Last Year: Never true   Transportation Needs: No Transportation Needs (6/20/2023)    PRAPARE - Transportation    • Lack of Transportation (Medical): No    • Lack of Transportation (Non-Medical):  No   Physical Activity: Inactive (5/20/2021)    Exercise Vital Sign    • Days of Exercise per Week: 0 days    • Minutes of Exercise per Session: 0 min   Stress: No Stress Concern Present (5/20/2021)    Larry Brady Stress Questionnaire    • Feeling of Stress : Not at all   Social Connections: Not on file   Intimate Partner Violence: Not on file   Housing Stability: Low Risk  (6/20/2023)    Housing Stability Vital Sign    • Unable to Pay for Housing in the Last Year: No    • Number of Places Lived in the Last Year: 1    • Unstable Housing in the Last Year: No       Current Medications  Current Outpatient Medications   Medication Sig Dispense Refill   • aspirin (ECOTRIN LOW STRENGTH) 81 mg EC tablet Take 1 tablet (81 mg total) by mouth daily 30 tablet 0   • diltiazem (CARDIZEM CD) 120 mg 24 hr capsule Take 1 capsule (120 mg total) by mouth daily 90 capsule 1   • pantoprazole (PROTONIX) 40 mg tablet TAKE 1 TABLET BY MOUTH EVERY DAY 90 tablet 1   • sildenafil (VIAGRA) 100 mg tablet Take 1 tablet (100 mg total) by mouth as needed for erectile dysfunction (Patient not taking: Reported on 6/19/2023) 30 tablet 2     No current facility-administered medications for this visit. Allergies  No Known Allergies      The following portions of the patient's history were reviewed and updated as appropriate: allergies, current medications, past medical history, past social history, past surgical history and problem list.      Vitals  Vitals:    09/13/23 1453   BP: 120/86   Pulse: 82   SpO2: 98%   Weight: 96.3 kg (212 lb 3.2 oz)   Height: 6' (1.829 m)           Physical Exam  Physical Exam  Constitutional:       Appearance: Normal appearance. HENT:      Head: Normocephalic and atraumatic. Right Ear: External ear normal.      Left Ear: External ear normal.      Nose: Nose normal.   Eyes:      General: No scleral icterus. Conjunctiva/sclera: Conjunctivae normal.   Cardiovascular:      Pulses: Normal pulses. Pulmonary:      Effort: Pulmonary effort is normal.   Genitourinary:     Testes: Normal.      Comments: No prostatic nodularity or tenderness  Musculoskeletal:         General: Normal range of motion.       Cervical back: Normal range of motion. Skin:     General: Skin is warm and dry. Neurological:      General: No focal deficit present. Mental Status: He is alert and oriented to person, place, and time. Psychiatric:         Mood and Affect: Mood normal.         Behavior: Behavior normal.         Thought Content:  Thought content normal.         Judgment: Judgment normal.           Results  Recent Results (from the past 1 hour(s))   POCT urine dip    Collection Time: 09/13/23  3:06 PM   Result Value Ref Range    LEUKOCYTE ESTERASE,UA -     NITRITE,UA -     SL AMB POCT UROBILINOGEN 0.2     POCT URINE PROTEIN -      PH,UA 6.0     BLOOD,UA -     SPECIFIC GRAVITY,UA 1.030     KETONES,UA -     BILIRUBIN,UA -     GLUCOSE, UA -      COLOR,UA yellow     CLARITY,UA clear    POCT Measure PVR    Collection Time: 09/13/23  3:08 PM   Result Value Ref Range    POST-VOID RESIDUAL VOLUME, ML POC 7 mL   ]  Lab Results   Component Value Date    PSA 1.55 06/17/2023    PSA 1.5 05/22/2021    PSA 0.9 07/02/2016     Lab Results   Component Value Date    CALCIUM 8.9 06/20/2023    K 4.4 06/20/2023    CO2 26 06/20/2023     06/20/2023    BUN 12 06/20/2023    CREATININE 1.14 06/20/2023     Lab Results   Component Value Date    WBC 4.78 06/20/2023    HGB 13.6 06/20/2023    HCT 39.2 06/20/2023    MCV 84 06/20/2023     (L) 06/20/2023           Orders  Orders Placed This Encounter   Procedures   • POCT Measure PVR   • POCT urine dip       Jorge A Concepcion

## 2023-10-05 ENCOUNTER — PREP FOR PROCEDURE (OUTPATIENT)
Dept: CARDIOLOGY CLINIC | Facility: CLINIC | Age: 58
End: 2023-10-05

## 2023-10-05 ENCOUNTER — OFFICE VISIT (OUTPATIENT)
Dept: CARDIOLOGY CLINIC | Facility: CLINIC | Age: 58
End: 2023-10-05
Payer: COMMERCIAL

## 2023-10-05 ENCOUNTER — TELEPHONE (OUTPATIENT)
Dept: CARDIOLOGY CLINIC | Facility: CLINIC | Age: 58
End: 2023-10-05

## 2023-10-05 VITALS
HEART RATE: 63 BPM | BODY MASS INDEX: 29.26 KG/M2 | HEIGHT: 72 IN | DIASTOLIC BLOOD PRESSURE: 78 MMHG | WEIGHT: 216 LBS | SYSTOLIC BLOOD PRESSURE: 122 MMHG

## 2023-10-05 DIAGNOSIS — I48.3 TYPICAL ATRIAL FLUTTER (HCC): Primary | ICD-10-CM

## 2023-10-05 DIAGNOSIS — I48.92 ATRIAL FLUTTER BY ELECTROCARDIOGRAM (HCC): Primary | ICD-10-CM

## 2023-10-05 PROCEDURE — 93000 ELECTROCARDIOGRAM COMPLETE: CPT | Performed by: INTERNAL MEDICINE

## 2023-10-05 PROCEDURE — 99203 OFFICE O/P NEW LOW 30 MIN: CPT | Performed by: INTERNAL MEDICINE

## 2023-10-05 NOTE — PROGRESS NOTES
EPS Consultation/New Patient Evaluation - Luke Randhawa 63 y.o. male MRN: [id number]       Referring: Dr. Kervin Agustin. CC/HPI:   It was a pleasure to see Luke Randhawa in our arrhythmia clinic at [facility]. As you know he is a 63 y.o. man with atrial fibrillation status post prior ablation in 2012 who presents to discuss management of atrial flutter. Patient is originally from [address] [place] [address]. He had atrial fibrillation in 2012 and had ablation at [facility].  He had done well since then until June of this year. He started having elevated heart rate and palpitations. He also had abdominal discomfort and was found to be in atrial flutter with RVR when he visited his primary doctor. He was recommended to see cardiologist urgently after being placed on Eliquis. He was recommended to go to the emergency room where he was started on IV Cardizem drip and converted to sinus rhythm. He now presents to discuss further management. He reports compliance with medication but he would like to come off medications if possible. He denies any further episodes of palpitations. He denies any chest pain, dizziness, lightheadedness, orthopnea, PND or syncope.       Past Medical History:  Past Medical History:   Diagnosis Date   • Benign prostatic hyperplasia    • Diverticulitis    • Erectile dysfunction    • Low testosterone    • Unspecified atrial fibrillation (HCC)     Resolved 6/29/2016        Medications:      Current Outpatient Medications:   •  aspirin (ECOTRIN LOW STRENGTH) 81 mg EC tablet, Take 1 tablet (81 mg total) by mouth daily, Disp: 30 tablet, Rfl: 0  •  diltiazem (CARDIZEM CD) 120 mg 24 hr capsule, Take 1 capsule (120 mg total) by mouth daily, Disp: 90 capsule, Rfl: 1  •  pantoprazole (PROTONIX) 40 mg tablet, TAKE 1 TABLET BY MOUTH EVERY DAY, Disp: 90 tablet, Rfl: 1  •  sildenafil (VIAGRA) 100 mg tablet, Take 1 tablet (100 mg total) by mouth as needed for erectile dysfunction (Patient not taking: Reported on 6/19/2023), Disp: 30 tablet, Rfl: 2     Family History   Problem Relation Age of Onset   • Diabetes Mother    • Diabetes type II Mother    • Hypertension Mother    • Diabetes Father    • Hypertension Family         Ess hypertension      Social History     Socioeconomic History   • Marital status: /Civil Union     Spouse name: Not on file   • Number of children: Not on file   • Years of education: Not on file   • Highest education level: Not on file   Occupational History   • Not on file   Tobacco Use   • Smoking status: Never   • Smokeless tobacco: Never   Vaping Use   • Vaping Use: Never used   Substance and Sexual Activity   • Alcohol use: Yes     Alcohol/week: 2.0 standard drinks of alcohol     Types: 2 Glasses of wine per week     Comment: ocassionally    • Drug use: No   • Sexual activity: Not on file   Other Topics Concern   • Not on file   Social History Narrative   • Not on file     Social Determinants of Health     Financial Resource Strain: Not on file   Food Insecurity: No Food Insecurity (6/20/2023)    Hunger Vital Sign    • Worried About Running Out of Food in the Last Year: Never true    • Ran Out of Food in the Last Year: Never true   Transportation Needs: No Transportation Needs (6/20/2023)    PRAPARE - Transportation    • Lack of Transportation (Medical): No    • Lack of Transportation (Non-Medical):  No   Physical Activity: Inactive (5/20/2021)    Exercise Vital Sign    • Days of Exercise per Week: 0 days    • Minutes of Exercise per Session: 0 min   Stress: No Stress Concern Present (5/20/2021)    [facility]    • Feeling of Stress : Not at all   Social Connections: Not on file   Intimate Partner Violence: Not on file   Housing Stability: [address]  (6/20/2023)    Housing Stability Vital Sign    • Unable to Pay for Housing in the Last Year: No    • Number of Places Lived in the Last Year: 1    • Unstable Housing in the Last Year: No     Social History     Tobacco Use   Smoking Status Never   Smokeless Tobacco Never     Social History     Substance and Sexual Activity   Alcohol Use Yes   • Alcohol/week: 2.0 standard drinks of alcohol   • Types: 2 Glasses of wine per week    Comment: ocassionally        Review of Systems   Constitutional: Negative for chills and fever. HENT: Negative. Eyes: Negative for blurred vision and double vision. Cardiovascular: Positive for palpitations. Negative for chest pain, dyspnea on exertion, leg swelling, near-syncope, orthopnea, paroxysmal nocturnal dyspnea and syncope. Respiratory: Negative for cough and sputum production. Endocrine: Negative. Skin: Negative. Negative for rash. Musculoskeletal: Negative. Negative for arthritis and joint pain. Gastrointestinal: Negative for abdominal pain, nausea and vomiting. Genitourinary: Negative. Neurological: Negative. Negative for dizziness and light-headedness. Psychiatric/Behavioral: Negative. The patient is not nervous/anxious. Objective:     Vitals: Blood pressure 122/78, pulse 63, height 6' (1.829 m), weight 98 kg (216 lb). , Body mass index is 29.29 kg/m². ,        Physical Exam:    GEN: Daron Crawford appears well, alert and oriented x 3, pleasant and cooperative   HEENT: pupils equal, round, and reactive to light; extraocular muscles intact  NECK: supple, no carotid bruits   HEART: regular rhythm, normal S1 and S2, no murmurs, clicks, gallops or rubs   LUNGS: clear to auscultation bilaterally; no wheezes, rales, or rhonchi   ABDOMEN: normal bowel sounds, soft, no tenderness, no distention  EXTREMITIES: peripheral pulses normal; no clubbing, cyanosis, or edema  NEURO: no focal findings   SKIN: normal without suspicious lesions on exposed skin      Labs & Results:  Below is the patient's most recent value for Albumin, ALT, AST, BUN, Calcium, Chloride, Cholesterol, CO2, Creatinine, GFR, Glucose, HDL, Hematocrit, Hemoglobin, Hemoglobin A1C, LDL, Magnesium, Phosphorus, Platelets, Potassium, PSA, Sodium, Triglycerides, and WBC. Lab Results   Component Value Date    ALT 50 06/17/2023    AST 27 06/17/2023    BUN 12 06/20/2023    CALCIUM 8.9 06/20/2023     06/20/2023    CO2 26 06/20/2023    CREATININE 1.14 06/20/2023    HDL 35 (L) 06/17/2023    HCT 39.2 06/20/2023    HGB 13.6 06/20/2023    HGBA1C 6.1 (H) 06/20/2023    MG 2.0 06/19/2023     (L) 06/20/2023    K 4.4 06/20/2023    PSA 1.55 06/17/2023    TRIG 202 (H) 06/17/2023    WBC 4.78 06/20/2023     Note: for a comprehensive list of the patient's lab results, access the Results Review activity. Cardiac testing:     I personally reviewed the ECG performed in the clinic on 10/05/23. It reveals sinus rhythm heart rate of 63 bpm.     Echocardiograms:  No results found for this or any previous visit. No results found for this or any previous visit. Catheterizations:   No results found for this or any previous visit. Stress Tests:  No results found for this or any previous visit. Holter monitor -   No results found for this or any previous visit. No results found for this or any previous visit.         ASSESSMENT/PLAN:  #1 atrial flutter  Patient is s/p atrial fibrillation ablation in 2012  Patient had atrial flutter in June 2023 with symptoms  Converted to sinus rhythm with IV Cardizem drip  Currently maintained on Cardizem and aspirin only due to low chads Vascor  EKG shows possible atypical flutter  Discussed ablation versus antiarrhythmic therapy  Patient would like to come off medications if possible and prefers to avoid ablation procedure  We will have our office schedule ablation procedure for the patient  Continue current medications in the meantime but will hold diltiazem 4 days prior to the procedure    Follow-up after ablation

## 2023-10-05 NOTE — TELEPHONE ENCOUNTER
Dr. Ish Jaime, what company? Patient scheduled for DAMION/Aflutter abl on 12/8/23 at [facility] with Dr. Ish Jaime. Instructions given to patient in office. No med holds. Can I have auth?

## 2023-10-06 ENCOUNTER — OFFICE VISIT (OUTPATIENT)
Dept: UROLOGY | Facility: CLINIC | Age: 58
End: 2023-10-06
Payer: COMMERCIAL

## 2023-10-06 VITALS
DIASTOLIC BLOOD PRESSURE: 86 MMHG | OXYGEN SATURATION: 97 % | BODY MASS INDEX: 29.96 KG/M2 | SYSTOLIC BLOOD PRESSURE: 124 MMHG | HEART RATE: 62 BPM | HEIGHT: 72 IN | WEIGHT: 221.2 LBS

## 2023-10-06 DIAGNOSIS — N52.8 OTHER MALE ERECTILE DYSFUNCTION: Primary | ICD-10-CM

## 2023-10-06 PROCEDURE — 99214 OFFICE O/P EST MOD 30 MIN: CPT | Performed by: PHYSICIAN ASSISTANT

## 2023-10-06 NOTE — PROGRESS NOTES
Trimix teaching  10/6/2023        Assessment and Plan    1. Erectile Dysfunction    Patient was provided verbal and physical demonstration of Trimix use. He was instructed on storage, disposal, and titration of the medication. He was educated on hospital precautions for priapism. He was able to demonstrate understanding of injection and injected 0.1cc of the medication today in the office. He tolerated the procedure well. Depending on response, will titrate as necessary and follow up in 2-3 months for reassessment. All questions and concerns have been addressed and answered. Chief Complaint   Patient presents with   • Follow-up     Erectile dysfunction         History of Present Illness  Jhoan Valdez is a 63 y.o. male here for trimix injection teaching. He has a history of erectile dysfuntion and has failed PDE5 inhibitors including Viagra. Review of Systems   Constitutional: Negative for chills and fever. Respiratory: Negative for shortness of breath. Cardiovascular: Negative for chest pain. Gastrointestinal: Negative for abdominal pain. Genitourinary: Negative for difficulty urinating, dysuria, flank pain, frequency, hematuria, penile pain, penile swelling and urgency. Neurological: Negative for dizziness.          Past Medical History  Past Medical History:   Diagnosis Date   • Benign prostatic hyperplasia    • Diverticulitis    • Erectile dysfunction    • Low testosterone    • Unspecified atrial fibrillation ([address])     Resolved 6/29/2016        Past Social History  Past Surgical History:   Procedure Laterality Date   • CARDIAC SURGERY      a-fib 2013   • COLONOSCOPY         Past Family History  Family History   Problem Relation Age of Onset   • Diabetes Mother    • Diabetes type II Mother    • Hypertension Mother    • Diabetes Father    • Hypertension Family         Ess hypertension        Past Social history  Social History     Socioeconomic History   • Marital status: /Civil Union Spouse name: Not on file   • Number of children: Not on file   • Years of education: Not on file   • Highest education level: Not on file   Occupational History   • Not on file   Tobacco Use   • Smoking status: Never   • Smokeless tobacco: Never   Vaping Use   • Vaping Use: Never used   Substance and Sexual Activity   • Alcohol use: Yes     Alcohol/week: 2.0 standard drinks of alcohol     Types: 2 Glasses of wine per week     Comment: ocassionally    • Drug use: No   • Sexual activity: Not on file   Other Topics Concern   • Not on file   Social History Narrative   • Not on file     Social Determinants of Health     Financial Resource Strain: Not on file   Food Insecurity: No Food Insecurity (6/20/2023)    Hunger Vital Sign    • Worried About Running Out of Food in the Last Year: Never true    • Ran Out of Food in the Last Year: Never true   Transportation Needs: No Transportation Needs (6/20/2023)    PRAPARE - Transportation    • Lack of Transportation (Medical): No    • Lack of Transportation (Non-Medical):  No   Physical Activity: Inactive (5/20/2021)    Exercise Vital Sign    • Days of Exercise per Week: 0 days    • Minutes of Exercise per Session: 0 min   Stress: No Stress Concern Present (5/20/2021)    [address] [facility]    • Feeling of Stress : Not at all   Social Connections: Not on file   Intimate Partner Violence: Not on file   Housing Stability: Low Risk  (6/20/2023)    Housing Stability Vital Sign    • Unable to Pay for Housing in the Last Year: No    • Number of Places Lived in the Last Year: 1    • Unstable Housing in the Last Year: No       Current Medications  Current Outpatient Medications   Medication Sig Dispense Refill   • aspirin (ECOTRIN LOW STRENGTH) 81 mg EC tablet Take 1 tablet (81 mg total) by mouth daily 30 tablet 0   • diltiazem (CARDIZEM CD) 120 mg 24 hr capsule Take 1 capsule (120 mg total) by mouth daily 90 capsule 1   • pantoprazole (PROTONIX) 40 mg tablet TAKE 1 TABLET BY MOUTH EVERY DAY 90 tablet 1   • patient supplied medication ALPROSTADIL/PAPAVERINE/PHENTOLAMINE 10 MCG-30 MG-1 MG/ML INJ SOLUTION (BUD)     • sildenafil (VIAGRA) 100 mg tablet Take 1 tablet (100 mg total) by mouth as needed for erectile dysfunction (Patient not taking: Reported on 6/19/2023) 30 tablet 2     No current facility-administered medications for this visit. Allergies  No Known Allergies      Past Medical History, Social History, Family History, medications and allergies were reviewed. Vitals  Vitals:    10/06/23 1425   BP: 124/86   Pulse: 62   SpO2: 97%   Weight: 100 kg (221 lb 3.2 oz)   Height: 6' (1.829 m)         Physical Exam    Constitutional   General appearance: Patient is seated and in no acute distress, well appearing and well nourished. Head and Face   Head and face: Normal.    Eyes   Conjunctiva and lids: No erythema, swelling or discharge. Ears, Nose, Mouth, and Throat   Hearing: Normal.    Pulmonary   Respiratory effort: No increased work of breathing or signs of respiratory distress. Cardiovascular   Examination of extremities for edema and/or varicosities: Normal.    Abdomen   Abdomen: Non-tender, no masses. Genitourinary  Penis uncircumcised, phallus normal, meatus patent. Testicles descended into scrotum bilaterally without masses nor tenderness. Musculoskeletal   Gait and station:  Normal   Skin   Skin and subcutaneous tissue: Warm, dry, and intact. No visible lesions or rashes. Psychiatric   Judgment and insight: Normal  Recent and remote memory:  Normal  Mood and affect: Normal        Procedure: intracavernosal injection  Indication: Erectile Rehabilitation  unknown cause  Discussed:. Proper technique and instruction for intracavernosal injection were explained to the patient.  risks of injection including but not limited to pain, bleeding, infection, fibrosis, and priapism (including the potential complications of priapism) were discussed. Procedure: The skin overlying the injection site was then prepped with Alcohol. 0.1 ml of was injected into the corpora cavernosum. Patient Status:. the patient was closely monitored for 10 minutes and reassessed. He tolerated the procedure well. Response to intracavernosal injection was excellent   Complications:. No complications noted. Instructions:. the patient was counseled about priapism and instructed to return to the clinic or the emergency room if his erection lasted up to 4 hours. the patient expressed understanding of the injection technique and felt able to perform self-injection.      Moshe Sheikh

## 2023-10-06 NOTE — PATIENT INSTRUCTIONS
Penile Injection Therapy    Penile injections can help you achieve an erection if you have erectile dysfunction (ED). It works best if it's given about 5 to 15 minutes before you want an erection. Stimulation, either with a partner or self-stimulation, is required    Medication  The three most commonly used medications for injection therapy are Trimix, Bimix, and Alprostadil. Most men begin injection therapy with Trimix, which is a mixture of three ingredients: alprostadil, phentolamine, and papaverine. These ingredients work by relaxing the smooth muscle and opening the blood vessels in your penis, causing an erection. Storing your medication  The alprostadil in Trimix can weaken over time, so Trimix should be kept cold and stored away from light. The medication can be stored in either the freezer or refrigerator. Trimix has a 90 day expiration date. This date is 90 days from when the compound was mixed at the Pharmacy. If kept frozen, this date can be extended to 180 days. If you are using Bimix, you don't need to keep the medication cold because this medication does not contain alprostadil. Don't use the medication if:  It has particles or is cloudy  The rubber stopper comes off of the vial  P  reparing the injection  Prepare a clean surface for your supplies. Gather your supplies:   1 medication vial  1 syringe (one-time use only)  2 alcohol wipes  Sharps container. You can use an empty detergent or bleach bottle with a cap, or a metal coffee can with a plastic top. Wash your hands well with soap and water. Drawing up the medication from the vial  If you're using the medication for the first time, take the cap off. Throw the cap away. Open an alcohol wipe and wipe the rubber stopper on the top of the vial. You must always wipe the rubber stopper with alcohol before you insert the needle. This will kill any bacteria on the rubber stopper. Take the syringe out of its package.  Remove the cap from the needle. Be very careful not to let anything touch the needle. If anything touches the needle, you must throw the entire syringe away in the sharps container and use a new one. This is because it will no longer be sterile. First, holding the syringe upright so the needle faces the karin, pull the plunger of the syringe back past the dose you were told to inject. Next, push the plunger back up until the top of the plunger (the thin black line closest to the needle) is at the dose you were told to inject. Turn the syringe downward so the needle is facing the floor. Hold the syringe in your hand like you hold a pen or dart. Hold the syringe close to the needle with your thumb, index (first) and middle (second) fingers. This will keep the needle from bending as you insert it into the rubber stopper. Support the medication vial with your other hand. Holding the vial upright and the syringe downward, insert the needle through the [place] in the center of the rubber stopper. Push the plunger down to inject the air into the vial (see Figure 1). You do this because the vial is pressurized. You must replace the amount of medication you remove from the vial with air. Turn the vial and syringe upside down (see Figure 2). Hold the syringe with the hand you use to write with and the vial with your other hand. Don't let go of the vial, or the needle will bend. Make sure the tip of the needle is in the medication. Rotate the syringe so you're looking at the numbers and lines on the syringe. Pull the plunger down past your prescribed dose. This will help remove any air bubbles. Slowly push the plunger back up to your prescribed dose. Check the amount of medication in the syringe to make sure it's the correct dose. Check for air bubbles in the syringe. If you see any air bubbles, pull more medication into the syringe. They will go to the top, towards the needle.  Slowly push the air bubbles and the extra medication back into the vial. Look at the syringe again to make sure that you have the right amount of medication. When you have the correct amount, pull the needle out of the vial. Place the cap back on the syringe without touching or bending the needle. If you touch or bend the needle, you will need to throw away the syringe and start at step 2. When you place the cap back on the syringe, make sure you don't push the plunger by accident. This will push the medication into the cap and result in the wrong amount when it's time to inject yourself. If you're using Trimix, put the medication vial back into the refrigerator. If you're using Bimix or Papaverine, you don't need to keep your medication in the refrigerator. Choosing an injection site  You must inject the medication into a specific area of your penis. This is so you don't inject into a nerve or blood vessel. Imagine that your penis is divided in 3 parts. You will give the injection in the middle third of your penis at the 10 o'clock (left side) or the 2 o'clock (right side) position (see Figure 3). To prevent trauma to your penile tissue, always change sides of your penis each time you inject the medication (right side, then left side). Keep a record each time so you don't forget. Don't inject into any vein you can see or feel because it could cause a large bruise on your penis. Don't inject straight down on the top or the bottom of your penis. Injecting the medication  Grasp the head of your penis, not the skin. This will allow your penis to be more fully stretched. It's easiest to inject if your penis is pulled straight out in front of you. If you aren't circumcised, pull your foreskin back before grasping the head of your penis. This will prevent the needle from going between the skin and erectile tissue. For your injection to work, the medication needs to go into the erectile tissue. Locate the area to be injected (the middle third of your penis).  Wipe it with an alcohol wipe. Let go of the head of your penis and  the syringe with 2 hands. Remove the cap covering the needle. Look at the syringe to make sure the dose is correct and you haven't pushed any medication out by accident. Hold the syringe between your thumb, index and middle fingers like a pen or a dart. The needle should be facing the floor. Don't place your index finger or thumb on the plunger. Once again, grasp the head of your penis and pull it straight out. Keep pulling on your penis from the time the needle goes into your penis until it comes out. Don't twist your penis, since this could lead to injecting the wrong area. Touch the needle to your skin and quickly slide it into the shaft of your penis. Remember to avoid any veins. Make sure to insert the needle at a slight angle (either the 10 o'clock or 2 o'clock position, as shown in Figure 3). Move your finger so that your index finger or thumb can push in the plunger. Quickly push down on the plunger to inject the medication into the shaft of your penis. Be careful not to pull the syringe out as you're injecting the medication. Once you have injected all of the medication, quickly pull the needle out of your penis. Pull it straight out. Don't use a twisting or jerking motion, because this may cause bruising. Apply pressure at the injection site for 2 to 3 minutes with your thumb on the injection site and your index finger on the opposite side of your penis. If you're taking a blood thinner or aspirin, hold the pressure for 5 minutes. This will help to decrease any bleeding or bruising. Place the syringe in the sharps container. You don't need to recap it. *It usually takes a few injections to find the right dose for an erection firm enough to have sex. It's important that you follow the treatment plan that your health care provider gave you: increase by 1 unit with each injection until you achieve an erection hard enough for penetration. Call if you have reached 7-8 units with lack of an erection firm enough for intercourse. Important Points  Don't take more than 1 injection in 24 hours, even if the medication doesn't work   You can inject up to 3 times a week as long as there are 24 hours between each injection. If you're currently receiving chemotherapy for cancer, ask your oncologist (cancer doctor) when you can safely inject. Don't take any other medication for ED without speaking to your health care provider  Do not take the following medications within 18 hours of injecting (before or after):  Sildenafil (Viagra? - 20 mg to 100 mg   Vardenafil (Levitra? - 10 mg to 20 mg   Avanafil (Stendra? - 50 mg to 200 mL  If you take tadalafil (Cialis? 10 mg or 20 mg, do not inject within 72 hours (3 days) of taking the medication  If you are using tadalafil (Cialis) 5 mg daily, ask your health care provider how you should use this medication along with your injections. Supplies  Your injection medication can't be ordered from your local retail pharmacy (such as [facility], [facility], [facility], or [facility]). It must be obtain at:    [facility]  [address]  [phone]    [address] [place] [address]  [phone]    Other:  _______________________________  _______________________________  _______________________________          Priapism  Sometimes, penile injections can cause priapism (an erection that lasts too long). Priapism can develop without sexual stimulation and doesn't go away after orgasm. When you have a full erection, no fresh blood flows into your penis. This means that your penis isn't getting oxygen, which can damage the tissue and lead to permanent ED. If you have priapism, taking pseudoephedrine HCl can help. Make sure to have pseudoephedrine HCl with you as long as you're using the penile injections.  If you have problems with your heart, talk with your cardiologist about whether it's safe for you to take pseudoephedrine HCl. If you develop priapism  If you have an erection at penetration hardness that lasts 2 hours, take 4 (30 mg) tablets of pseudoephedrine HCl (Sudafed) - see figure 4   Don't take extended-release or long-acting tablets, such as Sudafed 12 hour  If your erection becomes less than penetration hardness within an hour of taking pseudoephedrine HCl, call your doctor's office the next day  If you have an erection that lasts 3 hours (you still have an erection 1 hour after taking the pseudoephedrine HCl), go to the ER  Figure 4  After Pelvic Surgery for Prostate or Bladder Cancer  Your response to oral ED medications may improve over the first 18 to 24 months after surgery. Try taking a full dose once a month. A full dose is one of the followin mg of sildenafil (Viagra) taken on an empty stomach (2 hours before or 2 hours after a meal). 20 mg of vardenafil (Levitra) taken on an empty stomach. 200 mg of avanafil Verita Ko) taken with or without food. 20 mg of tadalafil (Cialis) taken with or without food. Traveling with Your Medication  Speak with your health care provider about how to travel with your injection medication. They can give you a letter explaining your treatment, if needed. If you need a letter, be sure to ask for it before your travel dates.   *Information supplied by [facility] ([email or web address]) and addendum provided by [facility]

## 2023-11-15 ENCOUNTER — OFFICE VISIT (OUTPATIENT)
Dept: GASTROENTEROLOGY | Facility: CLINIC | Age: 58
End: 2023-11-15
Payer: COMMERCIAL

## 2023-11-15 ENCOUNTER — APPOINTMENT (OUTPATIENT)
Dept: LAB | Facility: HOSPITAL | Age: 58
End: 2023-11-15

## 2023-11-15 VITALS
SYSTOLIC BLOOD PRESSURE: 140 MMHG | DIASTOLIC BLOOD PRESSURE: 86 MMHG | HEART RATE: 70 BPM | HEIGHT: 72 IN | BODY MASS INDEX: 29.66 KG/M2 | WEIGHT: 219 LBS

## 2023-11-15 DIAGNOSIS — K29.70 HELICOBACTER PYLORI GASTRITIS: Primary | ICD-10-CM

## 2023-11-15 DIAGNOSIS — B96.81 HELICOBACTER PYLORI GASTRITIS: Primary | ICD-10-CM

## 2023-11-15 PROCEDURE — 99213 OFFICE O/P EST LOW 20 MIN: CPT | Performed by: PHYSICIAN ASSISTANT

## 2023-11-15 NOTE — PROGRESS NOTES
[facility]  Ravi Small 63 y.o. male MRN: [id number]  Encounter: [id number]          ASSESSMENT AND PLAN:      1. Helicobacter pylori gastritis  - Diagnosed during EGD in August after he reported abdominal bloating and early satiety which has now resolved since completing treatment for the  H. pylori  - s/p treatment with tetracycline, flagyl, PPI, and pepto  - Discussed follow up stool testing to confirm eradication, they will go to the lab to get the stool kit    ______________________________________________________________________    SUBJECTIVE:  Lynn is a 62 yo M presenting for follow up of bloating and H. Pylori gastritis diagnosed during his EGD in August. He was last seen in July for abdominal bloating and early satiety. His EGD showed gastritis and path was positive for H. pylori so he was treated with tetracycline and flagyl along with pepto. He was on a PPI for about 2 months and stopped this for about 3 weeks ago. He reports he is overall feeling much better after the H. Pylori treatment. He reports vague itching that has been going on for a few months without a rash or any other symptoms like jaundice or abdominal pain. REVIEW OF SYSTEMS IS OTHERWISE NEGATIVE.       Historical Information   Past Medical History:   Diagnosis Date    Benign prostatic hyperplasia     Diverticulitis     Erectile dysfunction     Low testosterone     Unspecified atrial fibrillation ([address])     Resolved 6/29/2016      Past Surgical History:   Procedure Laterality Date    CARDIAC SURGERY      a-fib 2013    COLONOSCOPY       Social History   Social History     Substance and Sexual Activity   Alcohol Use Yes    Alcohol/week: 2.0 standard drinks of alcohol    Types: 2 Glasses of wine per week    Comment: ocassionally      Social History     Substance and Sexual Activity   Drug Use No     Social History     Tobacco Use   Smoking Status Never   Smokeless Tobacco Never     Family History   Problem Relation Age of Onset    Diabetes Mother     Diabetes type II Mother     Hypertension Mother     Diabetes Father     Hypertension Family         Ess hypertension        Meds/Allergies       Current Outpatient Medications:     aspirin (ECOTRIN LOW STRENGTH) 81 mg EC tablet    diltiazem (CARDIZEM CD) 120 mg 24 hr capsule    pantoprazole (PROTONIX) 40 mg tablet    patient supplied medication    sildenafil (VIAGRA) 100 mg tablet    No Known Allergies        Objective     Blood pressure 140/86, pulse 70, height 6' (1.829 m), weight 99.3 kg (219 lb). Body mass index is 29.7 kg/m². PHYSICAL EXAM:      General Appearance:   Alert, cooperative, no distress   HEENT:   Normocephalic, atraumatic, anicteric. Neck:  Supple, symmetrical, trachea midline   Lungs:   Clear to auscultation bilaterally; no rales, rhonchi or wheezing; respirations unlabored    Heart[de-identified]   Regular rate and rhythm; no murmur, rub, or gallop. Abdomen:   Soft, non-tender, non-distended; normal bowel sounds; no masses, no organomegaly    Genitalia:   Deferred    Rectal:   Deferred    Extremities:  No cyanosis, clubbing or edema    Pulses:  2+ and symmetric    Skin:  No jaundice, rashes, or lesions    Lymph nodes:  No palpable cervical lymphadenopathy        Lab Results:   No visits with results within 1 Day(s) from this visit. Latest known visit with results is:   Office Visit on 09/13/2023   Component Date Value    POST-VOID RESIDUAL VOLUM* 09/13/2023 7     LEUKOCYTE ESTERASE,UA 09/13/2023 -     Alois Jasmin 09/13/2023 -     [facility] POCT UROBILINOGEN 09/13/2023 0.2     POCT URINE PROTEIN 09/13/2023 -      [place] 09/13/2023 6.0     BLOOD,UA 09/13/2023 -     SPECIFIC GRAVITY,UA 09/13/2023 1.030     KETONES,UA 09/13/2023 -     BILIRUBIN,UA 09/13/2023 -     GLUCOSE, UA 09/13/2023 -      Ryan Donte 09/13/2023 yellow     CLARITY,UA 09/13/2023 clear          Radiology Results:   No results found.

## 2023-11-18 ENCOUNTER — LAB (OUTPATIENT)
Dept: LAB | Facility: HOSPITAL | Age: 58
End: 2023-11-18
Payer: COMMERCIAL

## 2023-11-18 DIAGNOSIS — B96.81 HELICOBACTER POSITIVE GASTRITIS: ICD-10-CM

## 2023-11-18 DIAGNOSIS — K29.70 HELICOBACTER POSITIVE GASTRITIS: ICD-10-CM

## 2023-11-18 PROCEDURE — 87338 HPYLORI STOOL AG IA: CPT

## 2023-11-21 LAB — H PYLORI AG STL QL IA: POSITIVE

## 2023-11-24 ENCOUNTER — NURSE TRIAGE (OUTPATIENT)
Age: 58
End: 2023-11-24

## 2023-11-24 DIAGNOSIS — B96.81 HELICOBACTER POSITIVE GASTRITIS: Primary | ICD-10-CM

## 2023-11-24 DIAGNOSIS — K29.70 HELICOBACTER POSITIVE GASTRITIS: Primary | ICD-10-CM

## 2023-11-24 RX ORDER — CLARITHROMYCIN 500 MG/1
500 TABLET, COATED ORAL EVERY 12 HOURS SCHEDULED
Qty: 28 TABLET | Refills: 0 | Status: SHIPPED | OUTPATIENT
Start: 2023-11-24 | End: 2023-11-27

## 2023-11-24 RX ORDER — OMEPRAZOLE 20 MG/1
20 CAPSULE, DELAYED RELEASE ORAL DAILY
Qty: 14 CAPSULE | Refills: 0 | Status: SHIPPED | OUTPATIENT
Start: 2023-11-24 | End: 2023-11-27 | Stop reason: SDUPTHER

## 2023-11-24 RX ORDER — AMOXICILLIN 500 MG/1
1000 CAPSULE ORAL EVERY 12 HOURS SCHEDULED
Qty: 56 CAPSULE | Refills: 0 | Status: SHIPPED | OUTPATIENT
Start: 2023-11-24 | End: 2023-11-27

## 2023-11-24 NOTE — RESULT ENCOUNTER NOTE
I spoke to him. He still has H. pylori gastritis. He failed quadruple therapy. I am going to give him triple therapy. He will do a stool H. pylori antigen in early February.

## 2023-11-24 NOTE — TELEPHONE ENCOUNTER
Reason for Disposition   Nursing judgment    Answer Assessment - Initial Assessment Questions  1. REASON FOR CALL or QUESTION: pt. Spouse calling stating that the triple therapy that was ordered for h. Pylori is contraindicated, pt. Spouse was made aware by pharmacy that pt. HR and BP will be effected due to taking Cardizem, pt. Spouse concerned and wants to know if pt. Should be taking the clarithromycin or not, picked up prescription today but has not started therapy yet, please advise    Protocols used:  Information Only Call - No Triage-ADULT-[place]

## 2023-11-27 DIAGNOSIS — B96.81 HELICOBACTER POSITIVE GASTRITIS: ICD-10-CM

## 2023-11-27 DIAGNOSIS — K29.70 HELICOBACTER POSITIVE GASTRITIS: ICD-10-CM

## 2023-11-27 RX ORDER — TETRACYCLINE HYDROCHLORIDE 500 MG/1
500 CAPSULE ORAL 4 TIMES DAILY
Qty: 40 CAPSULE | Refills: 0 | Status: ON HOLD | OUTPATIENT
Start: 2023-11-27 | End: 2023-12-08 | Stop reason: ALTCHOICE

## 2023-11-27 RX ORDER — OMEPRAZOLE 20 MG/1
20 CAPSULE, DELAYED RELEASE ORAL 2 TIMES DAILY
Qty: 28 CAPSULE | Refills: 0 | Status: ON HOLD | OUTPATIENT
Start: 2023-11-27 | End: 2023-12-09 | Stop reason: SDUPTHER

## 2023-11-27 RX ORDER — BISMUTH SUBSALICYLATE 262 MG/1
524 TABLET, CHEWABLE ORAL
Qty: 80 TABLET | Refills: 0 | Status: SHIPPED | OUTPATIENT
Start: 2023-11-27 | End: 2023-12-09

## 2023-11-27 RX ORDER — METRONIDAZOLE 500 MG/1
500 TABLET ORAL EVERY 8 HOURS SCHEDULED
Qty: 30 TABLET | Refills: 0 | Status: ON HOLD | OUTPATIENT
Start: 2023-11-27 | End: 2023-12-08 | Stop reason: ALTCHOICE

## 2023-11-27 NOTE — TELEPHONE ENCOUNTER
Called and relayed to pt providers message. Advised pt to stop taking the current regiment and to start taking the new prescription that Cassandra Marks PA-C called in today. Pt verbalized understanding.

## 2023-12-02 ENCOUNTER — APPOINTMENT (OUTPATIENT)
Dept: LAB | Facility: HOSPITAL | Age: 58
End: 2023-12-02
Payer: COMMERCIAL

## 2023-12-02 DIAGNOSIS — I48.3 TYPICAL ATRIAL FLUTTER (HCC): ICD-10-CM

## 2023-12-02 LAB
ALBUMIN SERPL BCP-MCNC: 4.3 G/DL (ref 3.5–5)
ALP SERPL-CCNC: 54 U/L (ref 34–104)
ALT SERPL W P-5'-P-CCNC: 52 U/L (ref 7–52)
ANION GAP SERPL CALCULATED.3IONS-SCNC: 3 MMOL/L
AST SERPL W P-5'-P-CCNC: 43 U/L (ref 13–39)
BASOPHILS # BLD AUTO: 0.03 THOUSANDS/ÂΜL (ref 0–0.1)
BASOPHILS NFR BLD AUTO: 1 % (ref 0–1)
BILIRUB SERPL-MCNC: 0.61 MG/DL (ref 0.2–1)
BUN SERPL-MCNC: 15 MG/DL (ref 5–25)
CALCIUM SERPL-MCNC: 9.3 MG/DL (ref 8.4–10.2)
CHLORIDE SERPL-SCNC: 109 MMOL/L (ref 96–108)
CO2 SERPL-SCNC: 29 MMOL/L (ref 21–32)
CREAT SERPL-MCNC: 0.91 MG/DL (ref 0.6–1.3)
EOSINOPHIL # BLD AUTO: 0.16 THOUSAND/ÂΜL (ref 0–0.61)
EOSINOPHIL NFR BLD AUTO: 4 % (ref 0–6)
ERYTHROCYTE [DISTWIDTH] IN BLOOD BY AUTOMATED COUNT: 13.2 % (ref 11.6–15.1)
GFR SERPL CREATININE-BSD FRML MDRD: 93 ML/MIN/1.73SQ M
GLUCOSE P FAST SERPL-MCNC: 103 MG/DL (ref 65–99)
HCT VFR BLD AUTO: 42 % (ref 36.5–49.3)
HGB BLD-MCNC: 14.2 G/DL (ref 12–17)
IMM GRANULOCYTES # BLD AUTO: 0.01 THOUSAND/UL (ref 0–0.2)
IMM GRANULOCYTES NFR BLD AUTO: 0 % (ref 0–2)
INR PPP: 1.09 (ref 0.84–1.19)
LYMPHOCYTES # BLD AUTO: 1.91 THOUSANDS/ÂΜL (ref 0.6–4.47)
LYMPHOCYTES NFR BLD AUTO: 45 % (ref 14–44)
MCH RBC QN AUTO: 28.4 PG (ref 26.8–34.3)
MCHC RBC AUTO-ENTMCNC: 33.8 G/DL (ref 31.4–37.4)
MCV RBC AUTO: 84 FL (ref 82–98)
MONOCYTES # BLD AUTO: 0.36 THOUSAND/ÂΜL (ref 0.17–1.22)
MONOCYTES NFR BLD AUTO: 9 % (ref 4–12)
NEUTROPHILS # BLD AUTO: 1.7 THOUSANDS/ÂΜL (ref 1.85–7.62)
NEUTS SEG NFR BLD AUTO: 41 % (ref 43–75)
NRBC BLD AUTO-RTO: 0 /100 WBCS
PLATELET # BLD AUTO: 180 THOUSANDS/UL (ref 149–390)
PMV BLD AUTO: 11 FL (ref 8.9–12.7)
POTASSIUM SERPL-SCNC: 4.4 MMOL/L (ref 3.5–5.3)
PROT SERPL-MCNC: 7.5 G/DL (ref 6.4–8.4)
PROTHROMBIN TIME: 14.7 SECONDS (ref 11.6–14.5)
RBC # BLD AUTO: 5 MILLION/UL (ref 3.88–5.62)
SODIUM SERPL-SCNC: 141 MMOL/L (ref 135–147)
TSH SERPL DL<=0.05 MIU/L-ACNC: 1.28 UIU/ML (ref 0.45–4.5)
WBC # BLD AUTO: 4.17 THOUSAND/UL (ref 4.31–10.16)

## 2023-12-02 PROCEDURE — 36415 COLL VENOUS BLD VENIPUNCTURE: CPT

## 2023-12-02 PROCEDURE — 80053 COMPREHEN METABOLIC PANEL: CPT

## 2023-12-02 PROCEDURE — 85610 PROTHROMBIN TIME: CPT

## 2023-12-02 PROCEDURE — 85025 COMPLETE CBC W/AUTO DIFF WBC: CPT

## 2023-12-05 ENCOUNTER — TELEPHONE (OUTPATIENT)
Dept: CARDIOLOGY CLINIC | Facility: CLINIC | Age: 58
End: 2023-12-05

## 2023-12-05 NOTE — TELEPHONE ENCOUNTER
----- Message from Stephanie Wright sent at 12/5/2023 10:36 AM EST -----  Regarding: [facility] in [place] phone number  Contact: [phone]  So I have to call them?

## 2023-12-05 NOTE — TELEPHONE ENCOUNTER
Spoke with pt and he said that he will call [facility] in regards to his procedure.

## 2023-12-08 ENCOUNTER — ANESTHESIA EVENT (OUTPATIENT)
Dept: NON INVASIVE DIAGNOSTICS | Facility: HOSPITAL | Age: 58
End: 2023-12-08
Payer: COMMERCIAL

## 2023-12-08 ENCOUNTER — ANESTHESIA (OUTPATIENT)
Dept: NON INVASIVE DIAGNOSTICS | Facility: HOSPITAL | Age: 58
End: 2023-12-08
Payer: COMMERCIAL

## 2023-12-08 ENCOUNTER — HOSPITAL ENCOUNTER (OUTPATIENT)
Facility: HOSPITAL | Age: 58
Setting detail: OUTPATIENT SURGERY
Discharge: HOME | End: 2023-12-09
Attending: INTERNAL MEDICINE | Admitting: INTERNAL MEDICINE
Payer: COMMERCIAL

## 2023-12-08 ENCOUNTER — APPOINTMENT (OUTPATIENT)
Dept: NON INVASIVE DIAGNOSTICS | Facility: HOSPITAL | Age: 58
End: 2023-12-08
Attending: INTERNAL MEDICINE
Payer: COMMERCIAL

## 2023-12-08 DIAGNOSIS — Z86.79 S/P ABLATION OF ATRIAL FIBRILLATION: Primary | ICD-10-CM

## 2023-12-08 DIAGNOSIS — Z98.890 S/P ABLATION OF ATRIAL FIBRILLATION: Primary | ICD-10-CM

## 2023-12-08 DIAGNOSIS — B96.81 HELICOBACTER POSITIVE GASTRITIS: ICD-10-CM

## 2023-12-08 DIAGNOSIS — I48.92 ATRIAL FLUTTER BY ELECTROCARDIOGRAM (HCC): ICD-10-CM

## 2023-12-08 DIAGNOSIS — K29.70 HELICOBACTER POSITIVE GASTRITIS: ICD-10-CM

## 2023-12-08 DIAGNOSIS — I48.3 TYPICAL ATRIAL FLUTTER (HCC): ICD-10-CM

## 2023-12-08 LAB
ANION GAP SERPL CALCULATED.3IONS-SCNC: 1 MMOL/L
ATRIAL RATE: 60 BPM
BUN SERPL-MCNC: 13 MG/DL (ref 5–25)
CALCIUM SERPL-MCNC: 9.4 MG/DL (ref 8.4–10.2)
CHLORIDE SERPL-SCNC: 109 MMOL/L (ref 96–108)
CO2 SERPL-SCNC: 29 MMOL/L (ref 21–32)
CREAT SERPL-MCNC: 0.84 MG/DL (ref 0.6–1.3)
ERYTHROCYTE [DISTWIDTH] IN BLOOD BY AUTOMATED COUNT: 12.9 % (ref 11.6–15.1)
GFR SERPL CREATININE-BSD FRML MDRD: 97 ML/MIN/1.73SQ M
GLUCOSE P FAST SERPL-MCNC: 96 MG/DL (ref 65–99)
GLUCOSE SERPL-MCNC: 96 MG/DL (ref 65–140)
HCT VFR BLD AUTO: 43 % (ref 36.5–49.3)
HGB BLD-MCNC: 14.7 G/DL (ref 12–17)
INR PPP: 1.12 (ref 0.84–1.19)
KCT BLD-ACNC: 302 SEC (ref 89–137)
KCT BLD-ACNC: 321 SEC (ref 89–137)
KCT BLD-ACNC: 341 SEC (ref 89–137)
KCT BLD-ACNC: 341 SEC (ref 89–137)
MCH RBC QN AUTO: 28.7 PG (ref 26.8–34.3)
MCHC RBC AUTO-ENTMCNC: 34.2 G/DL (ref 31.4–37.4)
MCV RBC AUTO: 84 FL (ref 82–98)
P AXIS: 8 DEGREES
PLATELET # BLD AUTO: 193 THOUSANDS/UL (ref 149–390)
PMV BLD AUTO: 10.8 FL (ref 8.9–12.7)
POTASSIUM SERPL-SCNC: 4.3 MMOL/L (ref 3.5–5.3)
PR INTERVAL: 148 MS
PROTHROMBIN TIME: 14.3 SECONDS (ref 11.6–14.5)
QRS AXIS: 10 DEGREES
QRSD INTERVAL: 90 MS
QT INTERVAL: 404 MS
QTC INTERVAL: 404 MS
RBC # BLD AUTO: 5.12 MILLION/UL (ref 3.88–5.62)
SODIUM SERPL-SCNC: 139 MMOL/L (ref 135–147)
SPECIMEN SOURCE: ABNORMAL
T WAVE AXIS: 31 DEGREES
VENTRICULAR RATE: 60 BPM
WBC # BLD AUTO: 3.27 THOUSAND/UL (ref 4.31–10.16)

## 2023-12-08 PROCEDURE — 93656 COMPRE EP EVAL ABLTJ ATR FIB: CPT | Performed by: INTERNAL MEDICINE

## 2023-12-08 PROCEDURE — 93010 ELECTROCARDIOGRAM REPORT: CPT | Performed by: INTERNAL MEDICINE

## 2023-12-08 PROCEDURE — 85027 COMPLETE CBC AUTOMATED: CPT | Performed by: PHYSICIAN ASSISTANT

## 2023-12-08 PROCEDURE — C1894 INTRO/SHEATH, NON-LASER: HCPCS | Performed by: INTERNAL MEDICINE

## 2023-12-08 PROCEDURE — 93657 TX L/R ATRIAL FIB ADDL: CPT | Performed by: INTERNAL MEDICINE

## 2023-12-08 PROCEDURE — 93655 ICAR CATH ABLTJ DSCRT ARRHYT: CPT | Performed by: INTERNAL MEDICINE

## 2023-12-08 PROCEDURE — 93623 PRGRMD STIMJ&PACG IV RX NFS: CPT | Performed by: INTERNAL MEDICINE

## 2023-12-08 PROCEDURE — 85347 COAGULATION TIME ACTIVATED: CPT

## 2023-12-08 PROCEDURE — 85610 PROTHROMBIN TIME: CPT | Performed by: PHYSICIAN ASSISTANT

## 2023-12-08 PROCEDURE — C1732 CATH, EP, DIAG/ABL, 3D/VECT: HCPCS | Performed by: INTERNAL MEDICINE

## 2023-12-08 PROCEDURE — 80048 BASIC METABOLIC PNL TOTAL CA: CPT | Performed by: PHYSICIAN ASSISTANT

## 2023-12-08 PROCEDURE — 93005 ELECTROCARDIOGRAM TRACING: CPT

## 2023-12-08 PROCEDURE — C1893 INTRO/SHEATH, FIXED,NON-PEEL: HCPCS | Performed by: INTERNAL MEDICINE

## 2023-12-08 PROCEDURE — C1769 GUIDE WIRE: HCPCS | Performed by: INTERNAL MEDICINE

## 2023-12-08 PROCEDURE — C1730 CATH, EP, 19 OR FEW ELECT: HCPCS | Performed by: INTERNAL MEDICINE

## 2023-12-08 PROCEDURE — NC001 PR NO CHARGE: Performed by: PHYSICIAN ASSISTANT

## 2023-12-08 RX ORDER — MIDAZOLAM HYDROCHLORIDE 2 MG/2ML
INJECTION, SOLUTION INTRAMUSCULAR; INTRAVENOUS AS NEEDED
Status: DISCONTINUED | OUTPATIENT
Start: 2023-12-08 | End: 2023-12-08

## 2023-12-08 RX ORDER — PROPOFOL 10 MG/ML
INJECTION, EMULSION INTRAVENOUS AS NEEDED
Status: DISCONTINUED | OUTPATIENT
Start: 2023-12-08 | End: 2023-12-08

## 2023-12-08 RX ORDER — LIDOCAINE HYDROCHLORIDE 10 MG/ML
INJECTION, SOLUTION EPIDURAL; INFILTRATION; INTRACAUDAL; PERINEURAL CODE/TRAUMA/SEDATION MEDICATION
Status: DISCONTINUED | OUTPATIENT
Start: 2023-12-08 | End: 2023-12-08 | Stop reason: HOSPADM

## 2023-12-08 RX ORDER — HEPARIN SODIUM 10000 [USP'U]/100ML
INJECTION, SOLUTION INTRAVENOUS
Status: DISCONTINUED | OUTPATIENT
Start: 2023-12-08 | End: 2023-12-08 | Stop reason: HOSPADM

## 2023-12-08 RX ORDER — FENTANYL CITRATE 50 UG/ML
INJECTION, SOLUTION INTRAMUSCULAR; INTRAVENOUS AS NEEDED
Status: DISCONTINUED | OUTPATIENT
Start: 2023-12-08 | End: 2023-12-08

## 2023-12-08 RX ORDER — SODIUM CHLORIDE 9 MG/ML
INJECTION, SOLUTION INTRAVENOUS CONTINUOUS PRN
Status: DISCONTINUED | OUTPATIENT
Start: 2023-12-08 | End: 2023-12-08

## 2023-12-08 RX ORDER — PROTAMINE SULFATE 10 MG/ML
INJECTION, SOLUTION INTRAVENOUS AS NEEDED
Status: DISCONTINUED | OUTPATIENT
Start: 2023-12-08 | End: 2023-12-08

## 2023-12-08 RX ORDER — ACETAMINOPHEN 325 MG/1
650 TABLET ORAL EVERY 4 HOURS PRN
Status: DISCONTINUED | OUTPATIENT
Start: 2023-12-08 | End: 2023-12-09 | Stop reason: HOSPADM

## 2023-12-08 RX ORDER — PANTOPRAZOLE SODIUM 40 MG/1
40 TABLET, DELAYED RELEASE ORAL DAILY
Status: DISCONTINUED | OUTPATIENT
Start: 2023-12-08 | End: 2023-12-09 | Stop reason: HOSPADM

## 2023-12-08 RX ORDER — DILTIAZEM HYDROCHLORIDE 120 MG/1
120 CAPSULE, COATED, EXTENDED RELEASE ORAL DAILY
Status: DISCONTINUED | OUTPATIENT
Start: 2023-12-08 | End: 2023-12-09 | Stop reason: HOSPADM

## 2023-12-08 RX ORDER — ADENOSINE 3 MG/ML
INJECTION INTRAVENOUS CODE/TRAUMA/SEDATION MEDICATION
Status: DISCONTINUED | OUTPATIENT
Start: 2023-12-08 | End: 2023-12-08 | Stop reason: HOSPADM

## 2023-12-08 RX ORDER — SODIUM CHLORIDE 9 MG/ML
20 INJECTION, SOLUTION INTRAVENOUS CONTINUOUS
Status: DISCONTINUED | OUTPATIENT
Start: 2023-12-08 | End: 2023-12-09

## 2023-12-08 RX ORDER — PROPOFOL 10 MG/ML
INJECTION, EMULSION INTRAVENOUS CONTINUOUS PRN
Status: DISCONTINUED | OUTPATIENT
Start: 2023-12-08 | End: 2023-12-08

## 2023-12-08 RX ORDER — HEPARIN SODIUM 1000 [USP'U]/ML
INJECTION, SOLUTION INTRAVENOUS; SUBCUTANEOUS CODE/TRAUMA/SEDATION MEDICATION
Status: DISCONTINUED | OUTPATIENT
Start: 2023-12-08 | End: 2023-12-08 | Stop reason: HOSPADM

## 2023-12-08 RX ORDER — SODIUM CHLORIDE 9 MG/ML
20 INJECTION, SOLUTION INTRAVENOUS ONCE
Status: DISCONTINUED | OUTPATIENT
Start: 2023-12-08 | End: 2023-12-08 | Stop reason: HOSPADM

## 2023-12-08 RX ORDER — PANTOPRAZOLE SODIUM 40 MG/10ML
40 INJECTION, POWDER, LYOPHILIZED, FOR SOLUTION INTRAVENOUS ONCE
Status: DISCONTINUED | OUTPATIENT
Start: 2023-12-08 | End: 2023-12-08 | Stop reason: HOSPADM

## 2023-12-08 RX ADMIN — PROTAMINE SULFATE 10 MG: 10 INJECTION, SOLUTION INTRAVENOUS at 14:28

## 2023-12-08 RX ADMIN — FENTANYL CITRATE 50 MCG: 50 INJECTION INTRAMUSCULAR; INTRAVENOUS at 12:31

## 2023-12-08 RX ADMIN — SODIUM CHLORIDE: 0.9 INJECTION, SOLUTION INTRAVENOUS at 11:34

## 2023-12-08 RX ADMIN — ASPIRIN 81 MG: 81 TABLET, COATED ORAL at 16:07

## 2023-12-08 RX ADMIN — PANTOPRAZOLE SODIUM 40 MG: 40 TABLET, DELAYED RELEASE ORAL at 17:12

## 2023-12-08 RX ADMIN — FENTANYL CITRATE 25 MCG: 50 INJECTION INTRAMUSCULAR; INTRAVENOUS at 12:40

## 2023-12-08 RX ADMIN — MIDAZOLAM 2 MG: 1 INJECTION INTRAMUSCULAR; INTRAVENOUS at 11:53

## 2023-12-08 RX ADMIN — SODIUM CHLORIDE: 0.9 INJECTION, SOLUTION INTRAVENOUS at 13:22

## 2023-12-08 RX ADMIN — PROPOFOL 20 MG: 10 INJECTION, EMULSION INTRAVENOUS at 12:18

## 2023-12-08 RX ADMIN — FENTANYL CITRATE 25 MCG: 50 INJECTION INTRAMUSCULAR; INTRAVENOUS at 12:53

## 2023-12-08 RX ADMIN — PROTAMINE SULFATE 10 MG: 10 INJECTION, SOLUTION INTRAVENOUS at 14:32

## 2023-12-08 RX ADMIN — PROTAMINE SULFATE 10 MG: 10 INJECTION, SOLUTION INTRAVENOUS at 14:33

## 2023-12-08 RX ADMIN — FENTANYL CITRATE 25 MCG: 50 INJECTION INTRAMUSCULAR; INTRAVENOUS at 12:56

## 2023-12-08 RX ADMIN — PHENYLEPHRINE HYDROCHLORIDE 30 MCG/MIN: 10 INJECTION INTRAVENOUS at 12:09

## 2023-12-08 RX ADMIN — PROTAMINE SULFATE 10 MG: 10 INJECTION, SOLUTION INTRAVENOUS at 14:26

## 2023-12-08 RX ADMIN — PROPOFOL 50 MCG/KG/MIN: 10 INJECTION, EMULSION INTRAVENOUS at 11:57

## 2023-12-08 RX ADMIN — SODIUM CHLORIDE 20 ML/HR: 0.9 INJECTION, SOLUTION INTRAVENOUS at 09:54

## 2023-12-08 RX ADMIN — PROTAMINE SULFATE 10 MG: 10 INJECTION, SOLUTION INTRAVENOUS at 14:24

## 2023-12-08 RX ADMIN — PROPOFOL 30 MG: 10 INJECTION, EMULSION INTRAVENOUS at 12:55

## 2023-12-08 RX ADMIN — PROPOFOL 20 MG: 10 INJECTION, EMULSION INTRAVENOUS at 12:06

## 2023-12-08 RX ADMIN — FENTANYL CITRATE 50 MCG: 50 INJECTION INTRAMUSCULAR; INTRAVENOUS at 11:53

## 2023-12-08 RX ADMIN — PROTAMINE SULFATE 10 MG: 10 INJECTION, SOLUTION INTRAVENOUS at 14:30

## 2023-12-08 RX ADMIN — FENTANYL CITRATE 25 MCG: 50 INJECTION INTRAMUSCULAR; INTRAVENOUS at 12:44

## 2023-12-08 RX ADMIN — APIXABAN 5 MG: 5 TABLET, FILM COATED ORAL at 17:10

## 2023-12-08 NOTE — H&P
H&P Exam - Electrophysiology - Cardiology   Daron Crawford 63 y.o. male MRN: [id number]  Unit/Bed#: BE CATH LAB ROOM Encounter: [id number]    Assessment/Plan     Assessment:  Recurrent atypical atrial flutter  Atrial fibrillation s/p Afib ablation 2012 at [facility]  LVEF 55%  Mild MR  H. Pylori gastritis  Diverticulosis    Plan:  -- NPO for Afib ablation today  -- informed consent  -- bedrest post procedure x 4 hours  -- resume Eliquis post procedure  -- continue PPI  -- aspirin 81 mg daily x 30 days  -- admit to med/surg tele overnight  -- D/C home tomorrow if stable      History of Present Illness   Daron Crawford is a 63 y.o. male with history of Afib s/p Afib ablation 2012, atypical atrial flutter, LVEF 55%, mild MR, H. Pylori gastritis, and diverticulosis who presents for redo ablation of atypical atrial flutter. He is originally from [address] [place] [address]. He had atrial fibrillation and underwent Afib ablation in 2012 at [facility].  He did well since then until June of this year. He started having elevated heart rate and palpitations. He also had abdominal discomfort and was found to be in atrial flutter with RVR when he visited his primary doctor. He was started on Eliquis and was referred to cardiology. He required ED treatment due to RVR and was placed on IV Cardizem drip. He converted to sinus rhythm after that. He has been compliant with medications but he would like to come off medications if possible. He was evaluated by Dr. Parth Noe in June 2023. Treatment options were discussed and he elected to proceed with catheter ablation of Afib. He was not currently maintained on anticoagulation due to low [id number] score. Today, he is feeling well. He denies any further episodes of palpitations. He denies any chest pain, dizziness, lightheadedness, orthopnea, PND or syncope.     EKG 12/8/2023: normal sinus rhythm HR 60 bpm     Echo 6/20/2023:    Left Ventricle: Left ventricular cavity size is normal. Wall thickness is mildly increased. The left ventricular ejection fraction is 55%. Systolic function is normal. Wall motion is normal. Diastolic function is normal.    Left Atrium: The atrium is mildly dilated. Right Atrium: The atrium is mildly dilated. Mitral Valve: There is annular calcification. There is mild regurgitation. Review of Systems   Constitutional: Negative. HENT: Negative. Eyes: Negative. Respiratory: Negative. Cardiovascular: Negative. Gastrointestinal: Negative. Endocrine: Negative. Genitourinary: Negative. Musculoskeletal: Negative. Skin:  Negative for rash. Neurological:  Negative for dizziness and syncope. Hematological: Negative. Psychiatric/Behavioral: Negative. ROS as noted above, otherwise 12 point review of systems was performed and is negative.      Historical Information   Past Medical History:   Diagnosis Date    Benign prostatic hyperplasia     Diverticulitis     Erectile dysfunction     Low testosterone     Unspecified atrial fibrillation ([address])     Resolved 6/29/2016      Past Surgical History:   Procedure Laterality Date    CARDIAC SURGERY      a-fib 2013    COLONOSCOPY       Family History:   Family History   Problem Relation Age of Onset    Diabetes Mother     Diabetes type II Mother     Hypertension Mother     Diabetes Father     Hypertension Family         Ess hypertension        Social History   Social History     Substance and Sexual Activity   Alcohol Use Yes    Alcohol/week: 2.0 standard drinks of alcohol    Types: 2 Glasses of wine per week    Comment: ocassionally      Social History     Substance and Sexual Activity   Drug Use No     Social History     Tobacco Use   Smoking Status Never   Smokeless Tobacco Never       Meds/Allergies   all medications and allergies reviewed  Home Medications:   Medications Prior to Admission   Medication    aspirin (ECOTRIN LOW STRENGTH) 81 mg EC tablet    bismuth subsalicylate (PEPTO BISMOL) 262 MG chewable tablet    diltiazem (CARDIZEM CD) 120 mg 24 hr capsule    omeprazole (PriLOSEC) 20 mg delayed release capsule    patient supplied medication    sildenafil (VIAGRA) 100 mg tablet       No Known Allergies    Objective   Vitals: Blood pressure 147/90, pulse 64, temperature 98.1 °F (36.7 °C), temperature source Temporal, resp. rate 17, height 6' (1.829 m), weight 97.5 kg (215 lb), SpO2 97 %. Orthostatic Blood Pressures      Flowsheet Row Most Recent Value   Blood Pressure 147/90 filed at 12/08/2023 1026              Intake/Output Summary (Last 24 hours) at 12/8/2023 1337  Last data filed at 12/8/2023 1322  Gross per 24 hour   Intake 1000 ml   Output --   Net 1000 ml       Invasive Devices       Peripheral Intravenous Line  Duration             Peripheral IV 12/08/23 Left Antecubital <1 day    Peripheral IV 12/08/23 Right Wrist <1 day              Line  Duration             Venous Sheath  Right Femoral <1 day    Venous Sheath Other (Comment) Left Femoral <1 day                    Physical Exam   GEN: NAD, alert and oriented, well appearing  SKIN: dry without significant lesions or rashes  HEENT: NCAT  NECK: No JVD or carotid bruits appreciated  CARDIOVASCULAR: RRR, normal S1, S2 without murmurs, rubs, or gallops appreciated  LUNGS: Clear to auscultation bilaterally without wheezes, rhonchi, or rales  ABDOMEN: Soft, nontender, nondistended  EXTREMITIES/VASCULAR: perfused without clubbing, cyanosis, or edema b/l  PSYCH: Normal mood and affect  NEURO: CN ll-Xll grossly intact    Lab Results: I have personally reviewed pertinent lab results.     Results from last 7 days   Lab Units 12/08/23  0954 12/02/23  0715   WBC Thousand/uL 3.27* 4.17*   HEMOGLOBIN g/dL 14.7 14.2   HEMATOCRIT % 43.0 42.0   PLATELETS Thousands/uL 193 180     Results from last 7 days   Lab Units 12/08/23  0954 12/02/23  0715   POTASSIUM mmol/L 4.3 4.4   CHLORIDE mmol/L 109* 109*   CO2 mmol/L 29 29   BUN mg/dL 13 15   CREATININE mg/dL 0.84 0.91   CALCIUM mg/dL 9.4 9.3     Results from last 7 days   Lab Units 12/08/23  0954 12/02/23  0715   INR  1.12 1.09         Imaging: I have personally reviewed pertinent reports. No results found for this or any previous visit. Code Status: Level 1 - Full Code    ** Please Note: Dictation voice to text software may have been used in the creation of this document.  **

## 2023-12-08 NOTE — DISCHARGE INSTR - AVS FIRST PAGE
PLEASE NOTE THE FOLLOWING MEDICATION RECOMMENDATIONS:  Take Eliquis 5 mg every 12 hours for 2 months then stop  Continue aspirin 81 mg once daily for 30 days, then then resume when Eliquis is stopped  Continue diltiazem  Take Protonix 40 mg once daily for 30 days, then stop        RESTRICTIONS:  No heavy lifting or strenuous activity for one week. No soaking in a bath tub/hot tub/swimming pool for one week or until groin heals. You may shower - please let soap and water run over the groins, no scrubbing, and pat the area dry. Please place band-aid on groins daily for up to five days, but you may remove sooner if no issues are noted. If you notice ongoing bleeding, swelling, or firm lumps in groin near ablation incision, please contact Dr. Pablo Taveras office - [phone].

## 2023-12-08 NOTE — ANESTHESIA PREPROCEDURE EVALUATION
Procedure:  atypical flutter ablation (Chest)    Relevant Problems   CARDIO   (+) Nonrheumatic mitral valve regurgitation   (+) PAF (paroxysmal atrial fibrillation) (HCC)   (+) Typical atrial flutter (HCC)        Physical Exam    Airway    Mallampati score: II  TM Distance: >3 FB  Neck ROM: full     Dental   No notable dental hx     Cardiovascular  Rhythm: regular, Rate: normal, Cardiovascular exam normal    Pulmonary  Pulmonary exam normal Breath sounds clear to auscultation, Breath sounds normal, No wheezes    Other Findings      Anesthesia Plan  ASA Score- 3     Anesthesia Type- IV sedation with anesthesia with ASA Monitors. Additional Monitors:     Airway Plan: NTT. Plan Factors-Exercise tolerance (METS): >4 METS. Chart reviewed. EKG reviewed. Imaging results reviewed. Existing labs reviewed. Patient summary reviewed. Patient is not a current smoker. Patient instructed to abstain from smoking on day of procedure. Patient did not smoke on day of surgery. Induction- intravenous. Postoperative Plan-     Informed Consent- Anesthetic plan and risks discussed with patient and spouse. I personally reviewed this patient with the CRNA. Discussed and agreed on the Anesthesia Plan with the CRNA. Fredi Mcneal

## 2023-12-08 NOTE — ANESTHESIA POSTPROCEDURE EVALUATION
Post-Op Assessment Note    CV Status:  Stable    Pain management: adequate       Mental Status:  Sleepy and lethargic   Hydration Status:  Stable   PONV Controlled:  None   Airway Patency:  Patent     Post Op Vitals Reviewed: Yes      Staff: CRNA           BP   133/92   Temp      Pulse  77   Resp      SpO2   98

## 2023-12-08 NOTE — PROGRESS NOTES
Pt awakens to voice, bilateral groin dressing dry and intact. No c/o pain voiced. Pt says he feels like he has to urinate but has voided some. Pt's  wife @ bedside. Pt transported to room 562 via stretcher.

## 2023-12-08 NOTE — DISCHARGE SUMMARY
Discharge Summary - Rosa Gaytan 63 y.o. male MRN: [id number]    Unit/Bed#: -01 Encounter: [id number]      Admission Date: 12/8/2023   Discharge Date: 12/9/2023    Discharge Diagnosis:   recurrent atypical atrial flutter  Atrial fibrillation s/p Afib ablation 2012 at [facility]    Procedures Performed:   Successful redo Afib ablation 12/8/2023 by Dr. Cha Null:   No arrhythmias inducible with atrial programmed stimulation with and without isoproterenol. Repeat isolation of the RPV and LPV. Successful isolation of the posterior wall. Successful empiric ablation of CTI line  Normal HV interval.   PLAN   - Bedrest for 4 hours with both legs straight  - Serial neurologic checks to watch for any evidence of stroke post-ablation  - Post-operative close vital sign monitoring and femoral access site serial examination  - Telemetry  - Start anticoagulation (dose this evening)  - Aspirin 81mg PO daily x 30 days  - Resume outpatient medications  - Call me with any questions, issues, or concerns  - Plan for follow-up office visit with me (Dr. Stalin Echols)      Orders Placed This Encounter   Procedures    Cardiac ep lab eps/ablations     Consultants: none    HPI: Rosa Gaytan is a 63year old male with a history of Afib s/p Afib ablation 2012, atypical atrial flutter, LVEF 55%, mild MR, H. Pylori gastritis, and diverticulosis who presents for redo ablation of atypical atrial flutter. He was not maintained on anticoagulation as an outpatient due to low [id number]. Hospital Course: He presented in sinus rhythm. He underwent successful redo Afib ablation on 12/8/2023 by Dr. Stalin Echols. The procedure consisted of reisolation of the RPV and LPV, posterior wall ablation, and CTI line. See procedure notes by Dr. Stalin Echols for details. He tolerated the procedure well. He was started on Eliquis 5 mg bid post procedure. He will take aspirin and PPI for 30 days. He will continue diltiazem. He was monitored overnight for observation. He remained in sinus rhythm. The following day, groin sites were stable with no bleeding, bruits or hematoma. Groin sutures were removed bilaterally. He denied chest pain/pressure, dyspnea, palpitations, dizziness, lightheadedness, or syncope. His vital signs were reviewed and labs are stable. Physical exam:  /95   Pulse 85   Temp 98.9 °F (37.2 °C)   Resp 16   Ht 6' (1.829 m)   Wt 97.5 kg (215 lb)   SpO2 96%   BMI 29.16 kg/m²   GEN: NAD, alert and oriented x 3, well appearing  SKIN: dry without significant lesions or rashes  HEENT: NCAT  NECK: No JVD appreciated  CARDIOVASCULAR: RRR, normal S1, S2 without murmurs, rubs, or gallops appreciated  LUNGS: Clear to auscultation bilaterally without wheezes, rhonchi, or rales  ABDOMEN: Soft, nontender, nondistended. Groins soft bilaterally, no bleeding, bruits or hematoma. EXTREMITIES/VASCULAR: perfused without clubbing, cyanosis, or LE edema b/l  PSYCH: Normal mood and affect  NEURO: CN ll-Xll grossly intact    He was given routine post ablation discharge instructions and restrictions, and these were explained in detail. He was given a follow up appointment with Christa Sidhu PA-C, and he was instructed to follow up with his primary cardiologist as previously instructed. Medication Instructions: Take Eliquis 5 mg bid for 2 months, then stop  Aspirin 81 mg daily for 30 days (resume when Eliquis stopped)  Omeprazole daily for 30 days  Continue diltiazem    He is stable for discharge at this time with all questions answered. He was discussed in detail with Dr. Zenobia Ruffin who is in agreement with this discharge summary. Discharge Medications:  See after visit summary for reconciled discharge medications provided to patient and family.     Medications Prior to Admission   Medication    aspirin (ECOTRIN LOW STRENGTH) 81 mg EC tablet    [] bismuth subsalicylate (PEPTO BISMOL) 262 MG chewable tablet diltiazem (CARDIZEM CD) 120 mg 24 hr capsule    [DISCONTINUED] omeprazole (PriLOSEC) 20 mg delayed release capsule    patient supplied medication    sildenafil (VIAGRA) 100 mg tablet       Pertininet Labs/diagnostics:  CBC with diff:   Results from last 7 days   Lab Units 12/09/23  0535 12/08/23  0954   WBC Thousand/uL 5.05 3.27*   HEMOGLOBIN g/dL 13.9 14.7   HEMATOCRIT % 40.0 43.0   MCV fL 84 84   PLATELETS Thousands/uL 165 193   RBC Million/uL 4.77 5.12   MCH pg 29.1 28.7   MCHC g/dL 34.8 34.2   RDW % 13.0 12.9   MPV fL 10.9 10.8       BMP:  Results from last 7 days   Lab Units 12/09/23  0535 12/08/23  0954   POTASSIUM mmol/L 4.0 4.3   CHLORIDE mmol/L 106 109*   CO2 mmol/L 26 29   BUN mg/dL 12 13   CREATININE mg/dL 0.84 0.84   CALCIUM mg/dL 8.7 9.4       Magnesium:       Coags:   Results from last 7 days   Lab Units 12/09/23  0535 12/08/23  0954   INR  1.26* 5.98       Complications: none    Condition at Discharge: good     Discharge instructions/Information to patient and family:   See after visit summary for information provided to patient and family. Provisions for Follow-Up Care:  See after visit summary for information related to follow-up care and any pertinent home health orders. Disposition: Home    Planned Readmission: No    Discharge Statement   I spent 45 minutes minutes discharging the patient. This time was spent on the day of discharge. I had direct contact with the patient on the day of discharge. Additional documentation is required if more than 30 minutes were spent on discharge. Evaluating the incision, discussing discharge instructions and restrictions, arranging follow up appointments, discussing medications.

## 2023-12-09 VITALS
BODY MASS INDEX: 29.12 KG/M2 | DIASTOLIC BLOOD PRESSURE: 95 MMHG | SYSTOLIC BLOOD PRESSURE: 143 MMHG | OXYGEN SATURATION: 96 % | HEIGHT: 72 IN | RESPIRATION RATE: 16 BRPM | HEART RATE: 85 BPM | TEMPERATURE: 98.9 F | WEIGHT: 215 LBS

## 2023-12-09 LAB
ANION GAP SERPL CALCULATED.3IONS-SCNC: 5 MMOL/L
ATRIAL RATE: 65 BPM
BUN SERPL-MCNC: 12 MG/DL (ref 5–25)
CALCIUM SERPL-MCNC: 8.7 MG/DL (ref 8.4–10.2)
CHLORIDE SERPL-SCNC: 106 MMOL/L (ref 96–108)
CO2 SERPL-SCNC: 26 MMOL/L (ref 21–32)
CREAT SERPL-MCNC: 0.84 MG/DL (ref 0.6–1.3)
ERYTHROCYTE [DISTWIDTH] IN BLOOD BY AUTOMATED COUNT: 13 % (ref 11.6–15.1)
GFR SERPL CREATININE-BSD FRML MDRD: 97 ML/MIN/1.73SQ M
GLUCOSE P FAST SERPL-MCNC: 95 MG/DL (ref 65–99)
GLUCOSE SERPL-MCNC: 95 MG/DL (ref 65–140)
HCT VFR BLD AUTO: 40 % (ref 36.5–49.3)
HGB BLD-MCNC: 13.9 G/DL (ref 12–17)
INR PPP: 1.26 (ref 0.84–1.19)
MCH RBC QN AUTO: 29.1 PG (ref 26.8–34.3)
MCHC RBC AUTO-ENTMCNC: 34.8 G/DL (ref 31.4–37.4)
MCV RBC AUTO: 84 FL (ref 82–98)
P AXIS: 69 DEGREES
PLATELET # BLD AUTO: 165 THOUSANDS/UL (ref 149–390)
PMV BLD AUTO: 10.9 FL (ref 8.9–12.7)
POTASSIUM SERPL-SCNC: 4 MMOL/L (ref 3.5–5.3)
PR INTERVAL: 172 MS
PROTHROMBIN TIME: 15.7 SECONDS (ref 11.6–14.5)
QRS AXIS: 8 DEGREES
QRSD INTERVAL: 92 MS
QT INTERVAL: 384 MS
QTC INTERVAL: 399 MS
RBC # BLD AUTO: 4.77 MILLION/UL (ref 3.88–5.62)
SODIUM SERPL-SCNC: 137 MMOL/L (ref 135–147)
T WAVE AXIS: 30 DEGREES
VENTRICULAR RATE: 65 BPM
WBC # BLD AUTO: 5.05 THOUSAND/UL (ref 4.31–10.16)

## 2023-12-09 PROCEDURE — 85027 COMPLETE CBC AUTOMATED: CPT | Performed by: PHYSICIAN ASSISTANT

## 2023-12-09 PROCEDURE — 85610 PROTHROMBIN TIME: CPT | Performed by: PHYSICIAN ASSISTANT

## 2023-12-09 PROCEDURE — 80048 BASIC METABOLIC PNL TOTAL CA: CPT | Performed by: PHYSICIAN ASSISTANT

## 2023-12-09 PROCEDURE — NC001 PR NO CHARGE: Performed by: PHYSICIAN ASSISTANT

## 2023-12-09 RX ORDER — OMEPRAZOLE 20 MG/1
20 CAPSULE, DELAYED RELEASE ORAL DAILY
Qty: 30 CAPSULE | Refills: 0 | Status: SHIPPED | OUTPATIENT
Start: 2023-12-09 | End: 2024-01-08

## 2023-12-09 RX ADMIN — PANTOPRAZOLE SODIUM 40 MG: 40 TABLET, DELAYED RELEASE ORAL at 08:13

## 2023-12-09 RX ADMIN — ASPIRIN 81 MG: 81 TABLET, COATED ORAL at 08:13

## 2023-12-09 RX ADMIN — DILTIAZEM HYDROCHLORIDE 120 MG: 120 CAPSULE, COATED, EXTENDED RELEASE ORAL at 08:13

## 2023-12-09 RX ADMIN — APIXABAN 5 MG: 5 TABLET, FILM COATED ORAL at 08:13

## 2024-01-08 ENCOUNTER — OFFICE VISIT (OUTPATIENT)
Age: 59
End: 2024-01-08
Payer: COMMERCIAL

## 2024-01-08 VITALS
WEIGHT: 219.4 LBS | OXYGEN SATURATION: 98 % | RESPIRATION RATE: 18 BRPM | HEART RATE: 74 BPM | DIASTOLIC BLOOD PRESSURE: 68 MMHG | TEMPERATURE: 97.6 F | SYSTOLIC BLOOD PRESSURE: 122 MMHG | BODY MASS INDEX: 29.76 KG/M2

## 2024-01-08 DIAGNOSIS — E78.5 BORDERLINE HYPERLIPIDEMIA: ICD-10-CM

## 2024-01-08 DIAGNOSIS — A04.8 H. PYLORI INFECTION: ICD-10-CM

## 2024-01-08 DIAGNOSIS — Z12.5 PROSTATE CANCER SCREENING: ICD-10-CM

## 2024-01-08 DIAGNOSIS — I48.0 PAF (PAROXYSMAL ATRIAL FIBRILLATION) (HCC): Primary | ICD-10-CM

## 2024-01-08 PROCEDURE — 99214 OFFICE O/P EST MOD 30 MIN: CPT | Performed by: FAMILY MEDICINE

## 2024-01-08 NOTE — PROGRESS NOTES
Name: Lynn Horner      : 1965      MRN: [id number]  Encounter Provider: Iesha Shannon DO  Encounter Date: 2024   Encounter department: [facility] [place]    Assessment & Plan     1. PAF (paroxysmal atrial fibrillation) (HCC)-status post atrial ablation.  Following with cardiology.  On aspirin and Eliquis as well as diltiazem for rate control  -     Comprehensive metabolic panel; Future; Expected date: 2024    2. H. pylori infection-status post triple therapy.  Continues to take PPI.  Has follow-up with GI in a few weeks    3. Borderline hyperlipidemia-noted on lipid study from .  Dietary changes recommended.  Will continue to monitor.  -     Lipid Panel with Direct LDL reflex; Future; Expected date: 2024    4. Prostate cancer screening  -     PSA, Total Screen; Future           Subjective      Patient presents for 6-month follow-up.  Discussed recent cardiovascular interventions.  Overall patient states he feels well      Review of Systems   Constitutional:  Negative for fever.   Respiratory:  Negative for shortness of breath.    Cardiovascular:  Negative for chest pain and palpitations.   Neurological:  Negative for light-headedness and headaches.       Current Outpatient Medications on File Prior to Visit   Medication Sig    apixaban (ELIQUIS) 5 mg Take 1 tablet (5 mg total) by mouth 2 (two) times a day    aspirin (ECOTRIN LOW STRENGTH) 81 mg EC tablet Take 1 tablet (81 mg total) by mouth daily    diltiazem (CARDIZEM CD) 120 mg 24 hr capsule Take 1 capsule (120 mg total) by mouth daily    omeprazole (PriLOSEC) 20 mg delayed release capsule Take 1 capsule (20 mg total) by mouth daily    patient supplied medication ALPROSTADIL/PAPAVERINE/PHENTOLAMINE 10 MCG-30 MG-1 MG/ML INJ SOLUTION (BUD)    [DISCONTINUED] sildenafil (VIAGRA) 100 mg tablet Take 1 tablet (100 mg total) by mouth as needed for erectile dysfunction (Patient not taking: Reported on 2023)        Objective     /68 (BP Location: Left arm, Patient Position: Sitting, Cuff Size: Large)   Pulse 74   Temp 97.6 °F (36.4 °C) (Temporal)   Resp 18   Wt 99.5 kg (219 lb 6.4 oz)   SpO2 98%   BMI 29.76 kg/m²     Physical Exam  HENT:      Head: Normocephalic.   Cardiovascular:      Rate and Rhythm: Normal rate and regular rhythm.      Heart sounds: No murmur heard.  Pulmonary:      Effort: Pulmonary effort is normal.      Breath sounds: Normal breath sounds.   Neurological:      Mental Status: He is alert and oriented to person, place, and time.   Psychiatric:         Mood and Affect: Mood normal.         Behavior: Behavior normal.       Iesha Shannon,

## 2024-01-13 DIAGNOSIS — I48.0 PAF (PAROXYSMAL ATRIAL FIBRILLATION) (HCC): ICD-10-CM

## 2024-01-15 RX ORDER — DILTIAZEM HYDROCHLORIDE 120 MG/1
120 CAPSULE, COATED, EXTENDED RELEASE ORAL DAILY
Qty: 90 CAPSULE | Refills: 3 | Status: SHIPPED | OUTPATIENT
Start: 2024-01-15

## 2024-01-22 ENCOUNTER — OFFICE VISIT (OUTPATIENT)
Dept: CARDIOLOGY CLINIC | Facility: CLINIC | Age: 59
End: 2024-01-22
Payer: COMMERCIAL

## 2024-01-22 VITALS
HEART RATE: 75 BPM | HEIGHT: 72 IN | SYSTOLIC BLOOD PRESSURE: 114 MMHG | WEIGHT: 218.6 LBS | BODY MASS INDEX: 29.61 KG/M2 | DIASTOLIC BLOOD PRESSURE: 80 MMHG

## 2024-01-22 DIAGNOSIS — I34.0 NONRHEUMATIC MITRAL VALVE REGURGITATION: ICD-10-CM

## 2024-01-22 DIAGNOSIS — Z86.79 S/P ABLATION OF ATRIAL FIBRILLATION: ICD-10-CM

## 2024-01-22 DIAGNOSIS — Z98.890 S/P ABLATION OF ATRIAL FIBRILLATION: ICD-10-CM

## 2024-01-22 DIAGNOSIS — I48.3 TYPICAL ATRIAL FLUTTER (HCC): ICD-10-CM

## 2024-01-22 DIAGNOSIS — I48.0 PAF (PAROXYSMAL ATRIAL FIBRILLATION) (HCC): Primary | ICD-10-CM

## 2024-01-22 PROCEDURE — 99213 OFFICE O/P EST LOW 20 MIN: CPT | Performed by: PHYSICIAN ASSISTANT

## 2024-01-22 PROCEDURE — 93000 ELECTROCARDIOGRAM COMPLETE: CPT | Performed by: PHYSICIAN ASSISTANT

## 2024-01-22 NOTE — PROGRESS NOTES
Electrophysiology Hospital Follow Up  Heart & Vascular Center  [facility] [address], [place] [address]    Name: Lynn Horner  : 1965  MRN: [id number]    ASSESSMENT:  1. PAF (paroxysmal atrial fibrillation) (HCC)        2. Typical atrial flutter (HCC)        3. s/p redo AFib ablation (PVI, posterior wall, CTI) 2023        4. Nonrheumatic mitral valve regurgitation            PLAN:  Paroxysmal atrial fibrillation/flutter  - anticoagulated with Eliquis post procedure / [id number] score of 0  - EF of 55% per echo 2023 / mild dilation of left atrium noted  - rate control: diltiazem  - antiarrhythmic therapy: none  - prior ablation: possible ablation [facility]   - now status post redo ablation with reisolation of PVCs, posterior wall isolation, and empiric CTI line by Dr. Vences 2023  Diverticulitis  H. pylori infection    IMPRESSION:  1. Atrial fibrillation - Mr. Horner is doing well from an arrhythmia standpoint.  He is in sinus rhythm after redo A-fib ablation (prior had been in [facility] in ).  He was advised he can continue to take Eliquis until , this would be 2 months from the procedure.  He has continued apixaban which can be continued.    He did question if there were any other medications to come off of, discussed how we will keep him on Cardizem at current dose during the 3-month blanking period and when seen back in the office we can discuss lowering or discontinuing this medication as it is not required for hypertension.    2. Omeprazole -patient is following up with GI and will have repeat stool sample for H. pylori in February.    Patient is to follow up in our EP office in 2-3 months. He is to call our office with any further questions or concerns in the meantime.    HPI:   Interim history: Lynn Horner is a 58 y.o. male with paroxysmal atrial fibrillation/flutter anticoagulated with Eliquis H.  pylori, and diverticulitis. He presents today for hospital follow up after ablation.    Patient has been doing well since ablation, offers no complaints such as chest discomfort or issues with groin healing.  He does not really feel his arrhythmia, he has continued both Eliquis and aspirin since the ablation.  He is also being treated for H. pylori and did require a second round of antibiotics, he is to do another stool sample in February.  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    Rhythm History:  1. Diagnosed with afib since 2012    A. Ablation at [facility]  2. Established with gen cards 6/2023   A. Was at PCP with GI discomfort - found to be in atrial flutter with RVR - put on Eliquis   B. Sent to ER - and placed on cardiazem drip that chemically converted - sent home on only aspirin  3. Seen by EP  10/2023 - preferred to come off medications  4. Underwent repeat afib and flutter ablation by  12/8/2023    EKG: Normal sinus rhythm at 75 bpm, no evidence of PACs or PVCs on rhythm strip    ROS:   Review of Systems   Constitutional: Negative for chills, diaphoresis, fever and malaise/fatigue.   Cardiovascular:  Negative for chest pain, claudication, cyanosis, dyspnea on exertion, irregular heartbeat, leg swelling, near-syncope, orthopnea, palpitations, paroxysmal nocturnal dyspnea and syncope.   Respiratory:  Negative for shortness of breath and sleep disturbances due to breathing.    Neurological:  Negative for dizziness and light-headedness.   All other systems reviewed and are negative.      OBJECTIVE:   Vitals:   /80 (BP Location: Left arm, Patient Position: Sitting, Cuff Size: Extra-Large)   Pulse 75   Ht 6' (1.829 m)   Wt 99.2 kg (218 lb 9.6 oz)   BMI 29.65 kg/m²       Physical Exam:   Physical Exam  Vitals and nursing note reviewed.   Constitutional:       General: He is not in acute distress.     Appearance: Normal appearance. He  is well-developed. He is not diaphoretic.   HENT:      Head: Normocephalic and atraumatic.   Eyes:      Pupils: Pupils are equal, round, and reactive to light.   Neck:      Vascular: No JVD.   Cardiovascular:      Rate and Rhythm: Normal rate and regular rhythm.      Heart sounds: No murmur heard.     No friction rub. No gallop.   Pulmonary:      Effort: Pulmonary effort is normal. No respiratory distress.      Breath sounds: Normal breath sounds. No wheezing.   Abdominal:      Palpations: Abdomen is soft.   Musculoskeletal:         General: Normal range of motion.      Cervical back: Normal range of motion.   Skin:     General: Skin is warm and dry.   Neurological:      Mental Status: He is alert and oriented to person, place, and time.   Psychiatric:         Mood and Affect: Mood normal.         Behavior: Behavior normal.         Medications:      Current Outpatient Medications:     apixaban (ELIQUIS) 5 mg, Take 1 tablet (5 mg total) by mouth 2 (two) times a day, Disp: 60 tablet, Rfl: 6    aspirin (ECOTRIN LOW STRENGTH) 81 mg EC tablet, Take 1 tablet (81 mg total) by mouth daily, Disp: 30 tablet, Rfl: 0    diltiazem (CARDIZEM CD) 120 mg 24 hr capsule, TAKE 1 CAPSULE BY MOUTH EVERY DAY, Disp: 90 capsule, Rfl: 3    omeprazole (PriLOSEC) 20 mg delayed release capsule, Take 1 capsule (20 mg total) by mouth daily, Disp: 30 capsule, Rfl: 0    patient supplied medication, ALPROSTADIL/PAPAVERINE/PHENTOLAMINE 10 MCG-30 MG-1 MG/ML INJ SOLUTION (BUD), Disp: , Rfl:      Family History   Problem Relation Age of Onset    Diabetes Mother     Diabetes type II Mother     Hypertension Mother     Diabetes Father     Hypertension Family         Ess hypertension      Social History     Socioeconomic History    Marital status: /Civil Union     Spouse name: Not on file    Number of children: Not on file    Years of education: Not on file    Highest education level: Not on file   Occupational History    Not on file   Tobacco Use     Smoking status: Never    Smokeless tobacco: Never   Vaping Use    Vaping status: Never Used   Substance and Sexual Activity    Alcohol use: Yes     Alcohol/week: 2.0 standard drinks of alcohol     Types: 2 Glasses of wine per week     Comment: ocassionally     Drug use: Never    Sexual activity: Not on file   Other Topics Concern    Not on file   Social History Narrative    Not on file     Social Determinants of Health     Financial Resource Strain: Not on file   Food Insecurity: No Food Insecurity (6/20/2023)    Hunger Vital Sign     Worried About Running Out of Food in the Last Year: Never true     Ran Out of Food in the Last Year: Never true   Transportation Needs: No Transportation Needs (6/20/2023)    PRAPARE - Transportation     Lack of Transportation (Medical): No     Lack of Transportation (Non-Medical): No   Physical Activity: Inactive (5/20/2021)    Exercise Vital Sign     Days of Exercise per Week: 0 days     Minutes of Exercise per Session: 0 min   Stress: No Stress Concern Present (5/20/2021)    [employer] - Occupational Stress Questionnaire     Feeling of Stress : Not at all   Social Connections: Not on file   Intimate Partner Violence: Not on file   Housing Stability: Low Risk  (6/20/2023)    Housing Stability Vital Sign     Unable to Pay for Housing in the Last Year: No     Number of Places Lived in the Last Year: 1     Unstable Housing in the Last Year: No     Social History     Tobacco Use   Smoking Status Never   Smokeless Tobacco Never     Social History     Substance and Sexual Activity   Alcohol Use Yes    Alcohol/week: 2.0 standard drinks of alcohol    Types: 2 Glasses of wine per week    Comment: ocassionally        Labs & Results:  Below is the patient's most recent value for Albumin, ALT, AST, BUN, Calcium, Chloride, Cholesterol, CO2, Creatinine, GFR, Glucose, HDL, Hematocrit, Hemoglobin, Hemoglobin A1C, LDL, Magnesium, Phosphorus, Platelets, Potassium, PSA,  Sodium, Triglycerides, and WBC.   Lab Results   Component Value Date    ALT 52 12/02/2023    AST 43 (H) 12/02/2023    BUN 12 12/09/2023    CALCIUM 8.7 12/09/2023     12/09/2023    CO2 26 12/09/2023    CREATININE 0.84 12/09/2023    HDL 35 (L) 06/17/2023    HCT 40.0 12/09/2023    HGB 13.9 12/09/2023    HGBA1C 6.1 (H) 06/20/2023    MG 2.0 06/19/2023     12/09/2023    K 4.0 12/09/2023    PSA 1.55 06/17/2023    TRIG 202 (H) 06/17/2023    WBC 5.05 12/09/2023     Note: for a comprehensive list of the patient's lab results, access the Results Review activity.    CARDIAC TESTING:   ECHO:   No results found for this or any previous visit.    No results found for this or any previous visit.      CATH:  No results found for this or any previous visit.      STRESS TEST:  No results found for this or any previous visit.

## 2024-02-17 ENCOUNTER — APPOINTMENT (OUTPATIENT)
Dept: LAB | Facility: HOSPITAL | Age: 59
End: 2024-02-17
Payer: COMMERCIAL

## 2024-02-17 DIAGNOSIS — Z12.5 PROSTATE CANCER SCREENING: ICD-10-CM

## 2024-02-17 DIAGNOSIS — B96.81 HELICOBACTER POSITIVE GASTRITIS: ICD-10-CM

## 2024-02-17 DIAGNOSIS — K29.70 HELICOBACTER POSITIVE GASTRITIS: ICD-10-CM

## 2024-02-17 LAB — PSA SERPL-MCNC: 2.48 NG/ML (ref 0–4)

## 2024-02-17 PROCEDURE — 87338 HPYLORI STOOL AG IA: CPT

## 2024-02-17 PROCEDURE — G0103 PSA SCREENING: HCPCS

## 2024-02-17 PROCEDURE — 36415 COLL VENOUS BLD VENIPUNCTURE: CPT

## 2024-02-19 ENCOUNTER — TELEPHONE (OUTPATIENT)
Age: 59
End: 2024-02-19

## 2024-02-20 LAB — H PYLORI AG STL QL IA: NEGATIVE

## 2024-02-26 ENCOUNTER — TELEPHONE (OUTPATIENT)
Dept: GASTROENTEROLOGY | Facility: CLINIC | Age: 59
End: 2024-02-26

## 2024-02-26 NOTE — TELEPHONE ENCOUNTER
Attempted to call pt but no answer.  VM left with the stool test result and to call us back if any questions.    Office # provided.        CHRISTOPHER Bailey-     The stool test for H. pylori was negative.  This is great news.  Have a good day.

## 2024-02-26 NOTE — TELEPHONE ENCOUNTER
----- Message from Cricket Truong III, MD sent at 2/25/2024 10:02 AM EST -----  Please review with the patient    CHRISTOPHER Bailey-     The stool test for H. pylori was negative.  This is great news.  Have a good day.

## 2025-01-21 DIAGNOSIS — I48.0 PAF (PAROXYSMAL ATRIAL FIBRILLATION) (HCC): ICD-10-CM

## 2025-01-23 RX ORDER — DILTIAZEM HYDROCHLORIDE 120 MG/1
120 CAPSULE, COATED, EXTENDED RELEASE ORAL DAILY
Qty: 90 CAPSULE | Refills: 3 | OUTPATIENT
Start: 2025-01-23

## 2025-01-23 RX ORDER — DILTIAZEM HYDROCHLORIDE 120 MG/1
120 CAPSULE, COATED, EXTENDED RELEASE ORAL DAILY
Qty: 90 CAPSULE | Refills: 0 | OUTPATIENT
Start: 2025-01-23

## 2025-01-27 NOTE — TELEPHONE ENCOUNTER
LMOV for pt informing him that a 1 month supply of diltiazem 120mg daily has been to the pharmacy but we would like to see him in the office or have him be seen by general cardiology for a f/u to continue filling med.

## 2025-03-07 DIAGNOSIS — I48.0 PAF (PAROXYSMAL ATRIAL FIBRILLATION) (HCC): ICD-10-CM

## 2025-03-07 RX ORDER — DILTIAZEM HYDROCHLORIDE 120 MG/1
120 CAPSULE, COATED, EXTENDED RELEASE ORAL DAILY
Qty: 30 CAPSULE | Refills: 0 | OUTPATIENT
Start: 2025-03-07

## 2025-04-06 ENCOUNTER — APPOINTMENT (EMERGENCY)
Dept: RADIOLOGY | Facility: HOSPITAL | Age: 60
End: 2025-04-06
Payer: COMMERCIAL

## 2025-04-06 ENCOUNTER — HOSPITAL ENCOUNTER (EMERGENCY)
Facility: HOSPITAL | Age: 60
Discharge: HOME | End: 2025-04-07
Attending: EMERGENCY MEDICINE
Payer: COMMERCIAL

## 2025-04-06 DIAGNOSIS — R07.9 CHEST PAIN: Primary | ICD-10-CM

## 2025-04-06 LAB
BASOPHILS # BLD AUTO: 0.02 THOUSANDS/ÂΜL (ref 0–0.1)
BASOPHILS NFR BLD AUTO: 0 % (ref 0–1)
EOSINOPHIL # BLD AUTO: 0.21 THOUSAND/ÂΜL (ref 0–0.61)
EOSINOPHIL NFR BLD AUTO: 4 % (ref 0–6)
ERYTHROCYTE [DISTWIDTH] IN BLOOD BY AUTOMATED COUNT: 12.8 % (ref 11.6–15.1)
HCT VFR BLD AUTO: 41.3 % (ref 36.5–49.3)
HGB BLD-MCNC: 13.7 G/DL (ref 12–17)
IMM GRANULOCYTES # BLD AUTO: 0.01 THOUSAND/UL (ref 0–0.2)
IMM GRANULOCYTES NFR BLD AUTO: 0 % (ref 0–2)
LYMPHOCYTES # BLD AUTO: 1.81 THOUSANDS/ÂΜL (ref 0.6–4.47)
LYMPHOCYTES NFR BLD AUTO: 36 % (ref 14–44)
MCH RBC QN AUTO: 28.6 PG (ref 26.8–34.3)
MCHC RBC AUTO-ENTMCNC: 33.2 G/DL (ref 31.4–37.4)
MCV RBC AUTO: 86 FL (ref 82–98)
MONOCYTES # BLD AUTO: 0.52 THOUSAND/ÂΜL (ref 0.17–1.22)
MONOCYTES NFR BLD AUTO: 10 % (ref 4–12)
NEUTROPHILS # BLD AUTO: 2.42 THOUSANDS/ÂΜL (ref 1.85–7.62)
NEUTS SEG NFR BLD AUTO: 50 % (ref 43–75)
NRBC BLD AUTO-RTO: 0 /100 WBCS
PLATELET # BLD AUTO: 166 THOUSANDS/UL (ref 149–390)
PMV BLD AUTO: 10.4 FL (ref 8.9–12.7)
RBC # BLD AUTO: 4.79 MILLION/UL (ref 3.88–5.62)
WBC # BLD AUTO: 4.99 THOUSAND/UL (ref 4.31–10.16)

## 2025-04-06 PROCEDURE — 99285 EMERGENCY DEPT VISIT HI MDM: CPT

## 2025-04-06 PROCEDURE — 93005 ELECTROCARDIOGRAM TRACING: CPT

## 2025-04-06 PROCEDURE — 84484 ASSAY OF TROPONIN QUANT: CPT | Performed by: EMERGENCY MEDICINE

## 2025-04-06 PROCEDURE — 85025 COMPLETE CBC W/AUTO DIFF WBC: CPT | Performed by: EMERGENCY MEDICINE

## 2025-04-06 PROCEDURE — 36415 COLL VENOUS BLD VENIPUNCTURE: CPT | Performed by: EMERGENCY MEDICINE

## 2025-04-06 PROCEDURE — 80053 COMPREHEN METABOLIC PANEL: CPT | Performed by: EMERGENCY MEDICINE

## 2025-04-06 PROCEDURE — 71045 X-RAY EXAM CHEST 1 VIEW: CPT

## 2025-04-07 VITALS
WEIGHT: 212.74 LBS | DIASTOLIC BLOOD PRESSURE: 72 MMHG | RESPIRATION RATE: 15 BRPM | HEART RATE: 60 BPM | TEMPERATURE: 97.7 F | BODY MASS INDEX: 28.85 KG/M2 | OXYGEN SATURATION: 99 % | SYSTOLIC BLOOD PRESSURE: 119 MMHG

## 2025-04-07 LAB
2HR DELTA HS TROPONIN: 1 NG/L
ALBUMIN SERPL BCG-MCNC: 3.9 G/DL (ref 3.5–5)
ALP SERPL-CCNC: 60 U/L (ref 34–104)
ALT SERPL W P-5'-P-CCNC: 23 U/L (ref 7–52)
ANION GAP SERPL CALCULATED.3IONS-SCNC: 8 MMOL/L (ref 4–13)
AST SERPL W P-5'-P-CCNC: 18 U/L (ref 13–39)
ATRIAL RATE: 70 BPM
BILIRUB SERPL-MCNC: 0.32 MG/DL (ref 0.2–1)
BUN SERPL-MCNC: 17 MG/DL (ref 5–25)
CALCIUM SERPL-MCNC: 8.9 MG/DL (ref 8.4–10.2)
CARDIAC TROPONIN I PNL SERPL HS: 7 NG/L (ref ?–50)
CARDIAC TROPONIN I PNL SERPL HS: 8 NG/L (ref ?–50)
CHLORIDE SERPL-SCNC: 105 MMOL/L (ref 96–108)
CO2 SERPL-SCNC: 22 MMOL/L (ref 21–32)
CREAT SERPL-MCNC: 0.84 MG/DL (ref 0.6–1.3)
GFR SERPL CREATININE-BSD FRML MDRD: 96 ML/MIN/1.73SQ M
GLUCOSE SERPL-MCNC: 131 MG/DL (ref 65–140)
P AXIS: 42 DEGREES
POTASSIUM SERPL-SCNC: 4 MMOL/L (ref 3.5–5.3)
PR INTERVAL: 136 MS
PROT SERPL-MCNC: 6.9 G/DL (ref 6.4–8.4)
QRS AXIS: 51 DEGREES
QRSD INTERVAL: 90 MS
QT INTERVAL: 386 MS
QTC INTERVAL: 416 MS
SODIUM SERPL-SCNC: 135 MMOL/L (ref 135–147)
T WAVE AXIS: 66 DEGREES
VENTRICULAR RATE: 70 BPM

## 2025-04-07 PROCEDURE — 84484 ASSAY OF TROPONIN QUANT: CPT | Performed by: EMERGENCY MEDICINE

## 2025-04-07 PROCEDURE — 36415 COLL VENOUS BLD VENIPUNCTURE: CPT | Performed by: EMERGENCY MEDICINE

## 2025-04-07 PROCEDURE — 93010 ELECTROCARDIOGRAM REPORT: CPT | Performed by: INTERNAL MEDICINE

## 2025-04-07 PROCEDURE — 99284 EMERGENCY DEPT VISIT MOD MDM: CPT | Performed by: EMERGENCY MEDICINE

## 2025-04-07 RX ORDER — ACETAMINOPHEN 325 MG/1
975 TABLET ORAL ONCE
Status: COMPLETED | OUTPATIENT
Start: 2025-04-07 | End: 2025-04-07

## 2025-04-07 RX ADMIN — ACETAMINOPHEN 975 MG: 325 TABLET, FILM COATED ORAL at 01:28

## 2025-04-13 NOTE — ED PROVIDER NOTES
Time reflects when diagnosis was documented in both MDM as applicable and the Disposition within this note       Time User Action Codes Description Comment    4/7/2025  2:39 AM Andry Alfaro Add [R07.9] Chest pain           ED Disposition       ED Disposition   Discharge    Condition   Stable    Date/Time   Mon Apr 7, 2025  2:39 AM    Comment   Lynn Horner discharge to home/self care.                   Assessment & Plan       Medical Decision Making  51-year-old male presenting to the emergency department for evaluation of chest pain.  Will check labs EKG cardiac enzymes, monitor and reassess.    Workup does not suggest ACS or other acute cardiopulmonary source.  Possible that his pain is musculoskeletal, will discharge with PCP follow-up and return precautions.    Amount and/or Complexity of Data Reviewed  Labs: ordered.  Radiology: ordered.    Risk  OTC drugs.             Medications   acetaminophen (TYLENOL) tablet 975 mg (975 mg Oral Given 4/7/25 0128)       ED Risk Strat Scores   HEART Risk Score      Flowsheet Row Most Recent Value   Heart Score Risk Calculator    History 0 Filed at: 04/07/2025 0239   ECG 1 Filed at: 04/07/2025 0239   Age 1 Filed at: 04/07/2025 0239   Risk Factors 1 Filed at: 04/07/2025 0239   Troponin 0 Filed at: 04/07/2025 0239   HEART Score 3 Filed at: 04/07/2025 0239          HEART Risk Score      Flowsheet Row Most Recent Value   Heart Score Risk Calculator    History 0 Filed at: 04/07/2025 0239   ECG 1 Filed at: 04/07/2025 0239   Age 1 Filed at: 04/07/2025 0239   Risk Factors 1 Filed at: 04/07/2025 0239   Troponin 0 Filed at: 04/07/2025 0239   HEART Score 3 Filed at: 04/07/2025 0239                      No data recorded        SBIRT 20yo+      Flowsheet Row Most Recent Value   Initial Alcohol Screen: US AUDIT-C     1. How often do you have a drink containing alcohol? 0 Filed at: 04/06/2025 6281   2. How many drinks containing alcohol do you have on a typical day you are drinking?  0  Filed at: 04/06/2025 2322   3a. Male UNDER 65: How often do you have five or more drinks on one occasion? 0 Filed at: 04/06/2025 2322   Audit-C Score 0 Filed at: 04/06/2025 2322   ARTUR: How many times in the past year have you...    Used an illegal drug or used a prescription medication for non-medical reasons? Never Filed at: 04/06/2025 2322                            History of Present Illness       Chief Complaint   Patient presents with    Chest Pain     CP started around 1700, pt also c/o neck pain for the past week, elevated bp at home       Past Medical History:   Diagnosis Date    A-fib (HCC)     Benign prostatic hyperplasia     Diverticulitis     Erectile dysfunction     Low testosterone     s/p redo AFib ablation (PVI, posterior wall, CTI) 12/8/2023 08/03/2023    Unspecified atrial fibrillation (HCC)     Resolved 6/29/2016       Past Surgical History:   Procedure Laterality Date    CARDIAC ELECTROPHYSIOLOGY PROCEDURE N/A 12/8/2023    Procedure: atypical flutter ablation;  Surgeon: Nicolás Vences MD;  Location: [facility];  Service: Cardiology    CARDIAC SURGERY      a-fib 2013    COLONOSCOPY        Family History   Problem Relation Age of Onset    Diabetes Mother     Diabetes type II Mother     Hypertension Mother     Diabetes Father     Hypertension Family         Ess hypertension       Social History     Tobacco Use    Smoking status: Never    Smokeless tobacco: Never   Vaping Use    Vaping status: Never Used   Substance Use Topics    Alcohol use: Yes     Alcohol/week: 2.0 standard drinks of alcohol     Types: 2 Glasses of wine per week     Comment: ocassionally     Drug use: Never      E-Cigarette/Vaping    E-Cigarette Use Never User       E-Cigarette/Vaping Substances    Nicotine No     THC No     CBD No     Flavoring No     Other No     Unknown No       I have reviewed and agree with the history as documented.     59-year-old male presenting to the emergency department for evaluation of  chest pain.  Patient describes substernal chest pain radiating to the back as well as high blood pressure when he checked it at home.  The neck pain been progressive since the past week but the chest pain started tonight is sharp.  It is not exertional.  He has no shortness of breath palpitations lightheadedness syncope or presyncope.  No recent fevers chills or cough.        Review of Systems   Constitutional:  Negative for appetite change, chills, fatigue and fever.   HENT:  Negative for sneezing and sore throat.    Eyes:  Negative for visual disturbance.   Respiratory:  Negative for cough, choking, chest tightness, shortness of breath and wheezing.    Cardiovascular:  Positive for chest pain. Negative for palpitations.   Gastrointestinal:  Negative for abdominal pain, constipation, diarrhea, nausea and vomiting.   Genitourinary:  Negative for difficulty urinating and dysuria.   Musculoskeletal:  Positive for neck pain.   Neurological:  Negative for dizziness, weakness, light-headedness, numbness and headaches.   All other systems reviewed and are negative.          Objective       ED Triage Vitals [04/06/25 2319]   Temperature Pulse Blood Pressure Respirations SpO2 Patient Position - Orthostatic VS   97.7 °F (36.5 °C) 75 166/95 18 99 % Sitting      Temp Source Heart Rate Source BP Location FiO2 (%) Pain Score    Temporal Monitor Left arm -- 3      Vitals      Date and Time Temp Pulse SpO2 Resp BP Pain Score FACES Pain Rating User   04/07/25 0200 -- 60 99 % 15 119/72 2 -- [place]   04/07/25 0128 -- -- -- -- -- 4 -- [place]   04/07/25 0045 -- 68 98 % 17 142/89 No Pain -- [place]   04/06/25 2319 97.7 °F (36.5 °C) 75 99 % 18 166/95 3 -- MINN[place]            Physical Exam  Vitals and nursing note reviewed.   Constitutional:       General: He is not in acute distress.     Appearance: He is well-developed. He is not diaphoretic.   HENT:      Head: Normocephalic and atraumatic.   Eyes:      Pupils: Pupils are equal, round, and reactive to  light.   Neck:      Vascular: No JVD.      Trachea: No tracheal deviation.   Cardiovascular:      Rate and Rhythm: Normal rate and regular rhythm.      Heart sounds: Normal heart sounds. No murmur heard.     No friction rub. No gallop.   Pulmonary:      Effort: Pulmonary effort is normal. No respiratory distress.      Breath sounds: Normal breath sounds. No wheezing or rales.   Abdominal:      General: Bowel sounds are normal. There is no distension.      Palpations: Abdomen is soft.      Tenderness: There is no abdominal tenderness. There is no guarding or rebound.   Skin:     General: Skin is warm and dry.      Coloration: Skin is not pale.   Neurological:      Mental Status: He is alert and oriented to person, place, and time.      Cranial Nerves: No cranial nerve deficit.      Motor: No abnormal muscle tone.   Psychiatric:         Behavior: Behavior normal.         Results Reviewed       Procedure Component Value Units Date/Time    HS Troponin I 2hr [[id number]]  (Normal) Collected: 04/07/25 0155    Lab Status: Final result Specimen: Blood from Arm, Left Updated: 04/07/25 0233     hs TnI 2hr 8 ng/L      Delta 2hr hsTnI 1 ng/L     HS Troponin 0hr (reflex protocol) [[id number]]  (Normal) Collected: 04/06/25 2351    Lab Status: Final result Specimen: Blood from Arm, Left Updated: 04/07/25 0022     hs TnI 0hr 7 ng/L     Comprehensive metabolic panel [[id number]] Collected: 04/06/25 2351    Lab Status: Final result Specimen: Blood from Arm, Left Updated: 04/07/25 0020     Sodium 135 mmol/L      Potassium 4.0 mmol/L      Chloride 105 mmol/L      CO2 22 mmol/L      ANION GAP 8 mmol/L      BUN 17 mg/dL      Creatinine 0.84 mg/dL      Glucose 131 mg/dL      Calcium 8.9 mg/dL      AST 18 U/L      ALT 23 U/L      Alkaline Phosphatase 60 U/L      Total Protein 6.9 g/dL      Albumin 3.9 g/dL      Total Bilirubin 0.32 mg/dL      eGFR 96 ml/min/1.73sq m     Narrative:      National Kidney Disease Foundation guidelines for Chronic  Kidney Disease (CKD):     Stage 1 with normal or high GFR (GFR > 90 mL/min/1.73 square meters)    Stage 2 Mild CKD (GFR = 60-89 mL/min/1.73 square meters)    Stage 3A Moderate CKD (GFR = 45-59 mL/min/1.73 square meters)    Stage 3B Moderate CKD (GFR = 30-44 mL/min/1.73 square meters)    Stage 4 Severe CKD (GFR = 15-29 mL/min/1.73 square meters)    Stage 5 End Stage CKD (GFR <15 mL/min/1.73 square meters)  Note: GFR calculation is accurate only with a steady state creatinine    CBC and differential [[id number]] Collected: 04/06/25 2351    Lab Status: Final result Specimen: Blood from Arm, Left Updated: 04/06/25 2357     WBC 4.99 Thousand/uL      RBC 4.79 Million/uL      Hemoglobin 13.7 g/dL      Hematocrit 41.3 %      MCV 86 fL      MCH 28.6 pg      MCHC 33.2 g/dL      RDW 12.8 %      MPV 10.4 fL      Platelets 166 Thousands/uL      nRBC 0 /100 WBCs      Segmented % 50 %      Immature Grans % 0 %      Lymphocytes % 36 %      Monocytes % 10 %      Eosinophils Relative 4 %      Basophils Relative 0 %      Absolute Neutrophils 2.42 Thousands/µL      Absolute Immature Grans 0.01 Thousand/uL      Absolute Lymphocytes 1.81 Thousands/µL      Absolute Monocytes 0.52 Thousand/µL      Eosinophils Absolute 0.21 Thousand/µL      Basophils Absolute 0.02 Thousands/µL             XR chest 1 view portable   Final Interpretation by Jil Arce MD (04/07 0913)      No acute cardiopulmonary disease.            Workstation performed: [id number]             Procedures    ED Medication and Procedure Management   Prior to Admission Medications   Prescriptions Last Dose Informant Patient Reported? Taking?   apixaban (ELIQUIS) 5 mg   No No   Sig: Take 1 tablet (5 mg total) by mouth 2 (two) times a day   aspirin (ECOTRIN LOW STRENGTH) 81 mg EC tablet  Self No No   Sig: Take 1 tablet (81 mg total) by mouth daily   diltiazem (CARDIZEM CD) 120 mg 24 hr capsule   No No   Sig: Take 1 capsule (120 mg total) by mouth daily   omeprazole  (PriLOSEC) 20 mg delayed release capsule   No No   Sig: Take 1 capsule (20 mg total) by mouth daily   patient supplied medication  Self Yes No   Sig: ALPROSTADIL/PAPAVERINE/PHENTOLAMINE 10 MCG-30 MG-1 MG/ML INJ SOLUTION (BUD)   Patient not taking: Reported on 1/22/2024      Facility-Administered Medications: None     Discharge Medication List as of 4/7/2025  2:41 AM        CONTINUE these medications which have NOT CHANGED    Details   apixaban (ELIQUIS) 5 mg Take 1 tablet (5 mg total) by mouth 2 (two) times a day, Starting Sat 12/9/2023, Normal      aspirin (ECOTRIN LOW STRENGTH) 81 mg EC tablet Take 1 tablet (81 mg total) by mouth daily, Starting Tue 6/20/2023, Normal      diltiazem (CARDIZEM CD) 120 mg 24 hr capsule Take 1 capsule (120 mg total) by mouth daily, Starting Mon 1/27/2025, Normal      omeprazole (PriLOSEC) 20 mg delayed release capsule Take 1 capsule (20 mg total) by mouth daily, Starting Sat 12/9/2023, Until Mon 1/8/2024, Normal      patient supplied medication ALPROSTADIL/PAPAVERINE/PHENTOLAMINE 10 MCG-30 MG-1 MG/ML INJ SOLUTION (BUD), Historical Med           No discharge procedures on file.  ED SEPSIS DOCUMENTATION   Time reflects when diagnosis was documented in both MDM as applicable and the Disposition within this note       Time User Action Codes Description Comment    4/7/2025  2:39 AM Andry Alfaro Add [R07.9] Chest pain                  Andry Alfaro MD  04/12/25 8785

## 2025-04-30 ENCOUNTER — OFFICE VISIT (OUTPATIENT)
Age: 60
End: 2025-04-30
Payer: COMMERCIAL

## 2025-04-30 VITALS
DIASTOLIC BLOOD PRESSURE: 90 MMHG | OXYGEN SATURATION: 96 % | HEIGHT: 72 IN | WEIGHT: 212.2 LBS | HEART RATE: 72 BPM | SYSTOLIC BLOOD PRESSURE: 130 MMHG | BODY MASS INDEX: 28.74 KG/M2 | TEMPERATURE: 97.5 F | RESPIRATION RATE: 18 BRPM

## 2025-04-30 DIAGNOSIS — Z00.00 ANNUAL PHYSICAL EXAM: Primary | ICD-10-CM

## 2025-04-30 DIAGNOSIS — R73.03 PREDIABETES: ICD-10-CM

## 2025-04-30 DIAGNOSIS — Z13.220 LIPID SCREENING: ICD-10-CM

## 2025-04-30 DIAGNOSIS — Z12.5 SCREENING FOR PROSTATE CANCER: ICD-10-CM

## 2025-04-30 DIAGNOSIS — Z72.820 POOR SLEEP: ICD-10-CM

## 2025-04-30 PROCEDURE — 99214 OFFICE O/P EST MOD 30 MIN: CPT | Performed by: FAMILY MEDICINE

## 2025-04-30 PROCEDURE — 99396 PREV VISIT EST AGE 40-64: CPT | Performed by: FAMILY MEDICINE

## 2025-04-30 RX ORDER — HYDROXYZINE PAMOATE 50 MG/1
50 CAPSULE ORAL
Qty: 30 CAPSULE | Refills: 0 | Status: SHIPPED | OUTPATIENT
Start: 2025-04-30

## 2025-04-30 RX ORDER — HYDROXYZINE PAMOATE 50 MG/1
50 CAPSULE ORAL 3 TIMES DAILY PRN
Qty: 30 CAPSULE | Refills: 0 | Status: SHIPPED | OUTPATIENT
Start: 2025-04-30 | End: 2025-04-30

## 2025-04-30 NOTE — PROGRESS NOTES
Adult Annual Physical  Name: Lynn Horner      : 1965      MRN: [id number]  Encounter Provider: Iesha Shannon DO  Encounter Date: 2025   Encounter department: [facility] [place]    :  Assessment & Plan  Annual physical exam  Overall well appearing 60 yo male        Lipid screening  Diet controlled. Last lipid study in  showed mild elevation of LDL. Will obtain UTD study and terat accordingly.   Orders:    Lipid Panel with Direct LDL reflex; Future    Poor sleep  Discussed sleep hygiene. Will trail sleep aid as well.   Orders:    hydrOXYzine pamoate (VISTARIL) 50 mg capsule; Take 1 capsule (50 mg total) by mouth daily at bedtime as needed (sleep)    Prediabetes  Diet controlled. Previous A1c of 6.1 over a year ago. Will obtain UTD study and treat accordingly.   Orders:    Hemoglobin A1C; Future    Screening for prostate cancer  Discussed risks and benefits of prostate cancer screening.   Orders:    PSA, Total Screen; Future                   Preventive Screenings:  - Diabetes Screening: screening up-to-date  - Hepatitis C screening: screening up-to-date   - Colon cancer screening: screening up-to-date   - Lung cancer screening: screening not indicated   - Prostate cancer screening: screening up-to-date     Counseling/Anticipatory Guidance:    - Diet: discussed recommendations for a healthy/well-balanced diet.   - Exercise: the importance of regular exercise/physical activity was discussed. Recommend exercise 3-5 times per week for at least 30 minutes.       Depression Screening and Follow-up Plan: Patient was screened for depression during today's encounter. They screened negative with a PHQ-2 score of 0.          History of Present Illness     Adult Annual Physical:  Patient presents for annual physical.     Diet and Physical Activity:  - Diet/Nutrition: no special diet.  - Exercise: no formal exercise.    Depression Screening:  - PHQ-2 Score: 0    General Health:  -  Sleep: 4-6 hours of sleep on average.  - Hearing: normal hearing bilateral ears.  - Vision: wears glasses.     Health:    - Urinary symptoms: nocturia.     Review of Systems   Constitutional:  Negative for fatigue.   Respiratory:  Negative for shortness of breath.    Cardiovascular:  Negative for chest pain.   Gastrointestinal:  Negative for constipation and diarrhea.   Musculoskeletal:  Negative for gait problem.   Neurological:  Positive for headaches (intermittent. self resolving). Negative for light-headedness.   Psychiatric/Behavioral:  Positive for sleep disturbance.          Objective   /90 (BP Location: Left arm, Patient Position: Sitting, Cuff Size: Large)   Pulse 72   Temp 97.5 °F (36.4 °C) (Tympanic)   Resp 18   Ht 6' (1.829 m)   Wt 96.3 kg (212 lb 3.2 oz)   SpO2 96%   BMI 28.78 kg/m²     Physical Exam  HENT:      Head: Normocephalic and atraumatic.      Right Ear: External ear normal.      Left Ear: External ear normal.   Eyes:      Conjunctiva/sclera: Conjunctivae normal.      Pupils: Pupils are equal, round, and reactive to light.   Cardiovascular:      Rate and Rhythm: Normal rate and regular rhythm.      Heart sounds: No murmur heard.  Pulmonary:      Effort: Pulmonary effort is normal.      Breath sounds: Normal breath sounds.   Abdominal:      General: Bowel sounds are normal.      Palpations: Abdomen is soft.   Musculoskeletal:      Right lower leg: No edema.      Left lower leg: No edema.   Neurological:      Mental Status: He is alert and oriented to person, place, and time.      Gait: Gait normal.   Psychiatric:         Mood and Affect: Mood normal.         Behavior: Behavior normal.

## 2025-04-30 NOTE — PATIENT INSTRUCTIONS
"Patient Education     Routine physical for adults   The Basics   Written by the doctors and editors at [facility]   What is a physical? -- A physical is a routine visit, or \"check-up,\" with your doctor. You might also hear it called a \"wellness visit\" or \"preventive visit.\"  During each visit, the doctor will:   Ask about your physical and mental health   Ask about your habits, behaviors, and lifestyle   Do an exam   Give you vaccines if needed   Talk to you about any medicines you take   Give advice about your health   Answer your questions  Getting regular check-ups is an important part of taking care of your health. It can help your doctor find and treat any problems you have. But it's also important for preventing health problems.  A routine physical is different from a \"sick visit.\" A sick visit is when you see a doctor because of a health concern or problem. Since physicals are scheduled ahead of time, you can think about what you want to ask the doctor.  How often should I get a physical? -- It depends on your age and health. In general, for people age 21 years and older:   If you are younger than 50 years, you might be able to get a physical every 3 years.   If you are 50 years or older, your doctor might recommend a physical every year.  If you have an ongoing health condition, like diabetes or high blood pressure, your doctor will probably want to see you more often.  What happens during a physical? -- In general, each visit will include:   Physical exam - The doctor or nurse will check your height, weight, heart rate, and blood pressure. They will also look at your eyes and ears. They will ask about how you are feeling and whether you have any symptoms that bother you.   Medicines - It's a good idea to bring a list of all the medicines you take to each doctor visit. Your doctor will talk to you about your medicines and answer any questions. Tell them if you are having any side effects that bother you. You " "should also tell them if you are having trouble paying for any of your medicines.   Habits and behaviors - This includes:   Your diet   Your exercise habits   Whether you smoke, drink alcohol, or use drugs   Whether you are sexually active   Whether you feel safe at home  Your doctor will talk to you about things you can do to improve your health and lower your risk of health problems. They will also offer help and support. For example, if you want to quit smoking, they can give you advice and might prescribe medicines. If you want to improve your diet or get more physical activity, they can help you with this, too.   Lab tests, if needed - The tests you get will depend on your age and situation. For example, your doctor might want to check your:   Cholesterol   Blood sugar   Iron level   Vaccines - The recommended vaccines will depend on your age, health, and what vaccines you already had. Vaccines are very important because they can prevent certain serious or deadly infections.   Discussion of screening - \"Screening\" means checking for diseases or other health problems before they cause symptoms. Your doctor can recommend screening based on your age, risk, and preferences. This might include tests to check for:   Cancer, such as breast, prostate, cervical, ovarian, colorectal, prostate, lung, or skin cancer   Sexually transmitted infections, such as chlamydia and gonorrhea   Mental health conditions like depression and anxiety  Your doctor will talk to you about the different types of screening tests. They can help you decide which screenings to have. They can also explain what the results might mean.   Answering questions - The physical is a good time to ask the doctor or nurse questions about your health. If needed, they can refer you to other doctors or specialists, too.  Adults older than 65 years often need other care, too. As you get older, your doctor will talk to you about:   How to prevent falling at " home   Hearing or vision tests   Memory testing   How to take your medicines safely   Making sure that you have the help and support you need at home  All topics are updated as new evidence becomes available and our peer review process is complete.  This topic retrieved from [employer] on: May 02, 2024.  Topic 759637 Version 1.0  Release: 32.4.3 - C32.122  © 2024 UpToDate, Inc. and/or its affiliates. All rights reserved.  Consumer Information Use and Disclaimer   Disclaimer: This generalized information is a limited summary of diagnosis, treatment, and/or medication information. It is not meant to be comprehensive and should be used as a tool to help the user understand and/or assess potential diagnostic and treatment options. It does NOT include all information about conditions, treatments, medications, side effects, or risks that may apply to a specific patient. It is not intended to be medical advice or a substitute for the medical advice, diagnosis, or treatment of a health care provider based on the health care provider's examination and assessment of a patient's specific and unique circumstances. Patients must speak with a health care provider for complete information about their health, medical questions, and treatment options, including any risks or benefits regarding use of medications. This information does not endorse any treatments or medications as safe, effective, or approved for treating a specific patient. UpToDate, Inc. and its affiliates disclaim any warranty or liability relating to this information or the use thereof.The use of this information is governed by the Terms of Use, available at https://www.[employer].com/en/know/clinical-effectiveness-terms. 2024© UpToDate, Inc. and its affiliates and/or licensors. All rights reserved.  Copyright   © 2024 UpToDate, Inc. and/or its affiliates. All rights reserved.     No

## 2025-05-01 ENCOUNTER — APPOINTMENT (OUTPATIENT)
Dept: LAB | Facility: HOSPITAL | Age: 60
End: 2025-05-01
Attending: FAMILY MEDICINE
Payer: COMMERCIAL

## 2025-05-01 DIAGNOSIS — R73.03 PREDIABETES: ICD-10-CM

## 2025-05-01 DIAGNOSIS — Z12.5 SCREENING FOR PROSTATE CANCER: ICD-10-CM

## 2025-05-01 DIAGNOSIS — Z13.220 LIPID SCREENING: ICD-10-CM

## 2025-05-01 LAB
CHOLEST SERPL-MCNC: 167 MG/DL (ref ?–200)
EST. AVERAGE GLUCOSE BLD GHB EST-MCNC: 126 MG/DL
HBA1C MFR BLD: 6 %
HDLC SERPL-MCNC: 44 MG/DL
LDLC SERPL CALC-MCNC: 111 MG/DL (ref 0–100)
PSA SERPL-MCNC: 2.65 NG/ML (ref 0–4)
TRIGL SERPL-MCNC: 58 MG/DL (ref ?–150)

## 2025-05-01 PROCEDURE — 80061 LIPID PANEL: CPT

## 2025-05-01 PROCEDURE — 36415 COLL VENOUS BLD VENIPUNCTURE: CPT

## 2025-05-01 PROCEDURE — G0103 PSA SCREENING: HCPCS

## 2025-05-01 PROCEDURE — 83036 HEMOGLOBIN GLYCOSYLATED A1C: CPT

## 2025-05-02 ENCOUNTER — RESULTS FOLLOW-UP (OUTPATIENT)
Age: 60
End: 2025-05-02

## 2025-05-02 NOTE — TELEPHONE ENCOUNTER
----- Message from Iesha Shannon DO sent at 5/2/2025  9:19 AM EDT -----  Please notify pt that overall his blood work looks good. Cholesterol levels are normal. A1c has improved to 6.0 and PSA is normal

## 2025-06-17 ENCOUNTER — OFFICE VISIT (OUTPATIENT)
Dept: CARDIOLOGY CLINIC | Facility: CLINIC | Age: 60
End: 2025-06-17
Payer: COMMERCIAL

## 2025-06-17 VITALS
WEIGHT: 212 LBS | SYSTOLIC BLOOD PRESSURE: 128 MMHG | BODY MASS INDEX: 28.71 KG/M2 | DIASTOLIC BLOOD PRESSURE: 78 MMHG | HEART RATE: 64 BPM | HEIGHT: 72 IN

## 2025-06-17 DIAGNOSIS — I48.0 PAF (PAROXYSMAL ATRIAL FIBRILLATION) (HCC): Primary | ICD-10-CM

## 2025-06-17 LAB
ATRIAL RATE: 64 BPM
P AXIS: -60 DEGREES
PR INTERVAL: 112 MS
QRS AXIS: 40 DEGREES
QRSD INTERVAL: 96 MS
QT INTERVAL: 398 MS
QTC INTERVAL: 411 MS
T WAVE AXIS: 39 DEGREES
VENTRICULAR RATE: 64 BPM

## 2025-06-17 PROCEDURE — 99215 OFFICE O/P EST HI 40 MIN: CPT | Performed by: INTERNAL MEDICINE

## 2025-06-17 PROCEDURE — 93000 ELECTROCARDIOGRAM COMPLETE: CPT | Performed by: INTERNAL MEDICINE

## 2025-06-17 NOTE — PROGRESS NOTES
EPS follow-up patient Evaluation - Lynn Horner 60 y.o. male MRN: [id number]       Referring: Dr. Montejo.    CC/HPI:   It was a pleasure to see Lynn Horner in our arrhythmia clinic at [facility]. As you know he is a 60 y.o. man with atrial fibrillation status post prior ablation in 2012 who presents to discuss management of atrial flutter.    Patient is originally from [facility].  He had atrial fibrillation in 2012 and had ablation at [facility].  He had done well since then until June of this year.  He started having elevated heart rate and palpitations.  He also had abdominal discomfort and was found to be in atrial flutter with RVR when he visited his primary doctor.  He was recommended to see cardiologist urgently after being placed on Eliquis.  He was recommended to go to the emergency room where he was started on IV Cardizem drip and converted to sinus rhythm.    He presented to discuss further management.  He reported compliance with medication but he would like to come off medications if possible.  He denied any further episodes of palpitations.  He denied any chest pain, dizziness, lightheadedness, orthopnea, PND or syncope.    Interval history:  Lynn Horner presents for follow-up visit.  After the previous visit he underwent repeat ablation with PVI, empiric CTI line and posterior wall isolation in December 2023. At a follow-up visit in January 2024 he was in sinus rhythm. Patient has come off anticoagulation and is only on aspirin 81 mg daily.  She is not taking aspirin daily and misses the dosage often.    He denies any palpitations since the ablation.  He denies any chest pain when he is exercising.  He notes heart rate is increasing up to 130s when he does that.  Denies any swelling in lower extremity, dizziness, lightheadedness.      Past Medical History:  Past Medical History:   Diagnosis Date    A-fib (HCC)     Benign prostatic hyperplasia      Diverticulitis     Diverticulitis of colon 06:16:23    Medical doctor diagnosed    Erectile dysfunction     Low testosterone     s/p redo AFib ablation (PVI, posterior wall, CTI) 12/8/2023 08/03/2023    Unspecified atrial fibrillation (HCC)     Resolved 6/29/2016        Medications:      Current Outpatient Medications:     apixaban (ELIQUIS) 5 mg, Take 1 tablet (5 mg total) by mouth 2 (two) times a day (Patient not taking: Reported on 4/30/2025), Disp: 60 tablet, Rfl: 6    aspirin (ECOTRIN LOW STRENGTH) 81 mg EC tablet, Take 1 tablet (81 mg total) by mouth daily, Disp: 30 tablet, Rfl: 0    diltiazem (CARDIZEM CD) 120 mg 24 hr capsule, Take 1 capsule (120 mg total) by mouth daily (Patient not taking: Reported on 4/30/2025), Disp: 30 capsule, Rfl: 0    hydrOXYzine pamoate (VISTARIL) 50 mg capsule, Take 1 capsule (50 mg total) by mouth daily at bedtime as needed (sleep), Disp: 30 capsule, Rfl: 0    omeprazole (PriLOSEC) 20 mg delayed release capsule, Take 1 capsule (20 mg total) by mouth daily (Patient not taking: Reported on 4/30/2025), Disp: 30 capsule, Rfl: 0    patient supplied medication, ALPROSTADIL/PAPAVERINE/PHENTOLAMINE 10 MCG-30 MG-1 MG/ML INJ SOLUTION (BUD) (Patient not taking: Reported on 1/22/2024), Disp: , Rfl:      Family History   Problem Relation Name Age of Onset    Diabetes Mother Shante Horner     Diabetes type II Mother Shante Horner     Hypertension Mother Shante Horner     Diabetes Father Valentina Pressley     Hypertension Family          Ess hypertension      Social History     Socioeconomic History    Marital status: /Civil Union     Spouse name: Not on file    Number of children: Not on file    Years of education: Not on file    Highest education level: Not on file   Occupational History    Not on file   Tobacco Use    Smoking status: Never    Smokeless tobacco: Never   Vaping Use    Vaping status: Never Used   Substance and Sexual Activity    Alcohol use: Yes     Alcohol/week: 2.0  standard drinks of alcohol     Types: 2 Glasses of wine per week     Comment: ocassionally     Drug use: Never    Sexual activity: Not on file   Other Topics Concern    Not on file   Social History Narrative    Not on file     Social Drivers of Health     Financial Resource Strain: Not on file   Food Insecurity: No Food Insecurity (6/20/2023)    Hunger Vital Sign     Worried About Running Out of Food in the Last Year: Never true     Ran Out of Food in the Last Year: Never true   Transportation Needs: No Transportation Needs (6/20/2023)    PRAPARE - Transportation     Lack of Transportation (Medical): No     Lack of Transportation (Non-Medical): No   Physical Activity: Inactive (5/20/2021)    Exercise Vital Sign     Days of Exercise per Week: 0 days     Minutes of Exercise per Session: 0 min   Stress: No Stress Concern Present (5/20/2021)    [employer] - Occupational Stress Questionnaire     Feeling of Stress : Not at all   Social Connections: Not on file   Intimate Partner Violence: Not on file   Housing Stability: Low Risk  (6/20/2023)    Housing Stability Vital Sign     Unable to Pay for Housing in the Last Year: No     Number of Places Lived in the Last Year: 1     Unstable Housing in the Last Year: No     Social History     Tobacco Use   Smoking Status Never   Smokeless Tobacco Never     Social History     Substance and Sexual Activity   Alcohol Use Yes    Alcohol/week: 2.0 standard drinks of alcohol    Types: 2 Glasses of wine per week    Comment: ocassionally        Review of Systems   Constitutional: Negative for chills and fever.   HENT: Negative.     Eyes:  Negative for blurred vision and double vision.   Cardiovascular:  Positive for palpitations. Negative for chest pain, dyspnea on exertion, leg swelling, near-syncope, orthopnea, paroxysmal nocturnal dyspnea and syncope.   Respiratory:  Negative for cough and sputum production.    Endocrine: Negative.    Skin: Negative.   Negative for rash.   Musculoskeletal: Negative.  Negative for arthritis and joint pain.   Gastrointestinal:  Negative for abdominal pain, nausea and vomiting.   Genitourinary: Negative.    Neurological: Negative.  Negative for dizziness and light-headedness.   Psychiatric/Behavioral: Negative.  The patient is not nervous/anxious.         Objective:     Vitals: Blood pressure 128/78, pulse 64, height 6' (1.829 m), weight 96.2 kg (212 lb)., Body mass index is 28.75 kg/m².,        Physical Exam:    GEN: Lynn Horner appears well, alert and oriented x 3, pleasant and cooperative   HEENT: pupils equal, round, and reactive to light; extraocular muscles intact  NECK: supple, no carotid bruits   HEART: regular rhythm, normal S1 and S2, no murmurs, clicks, gallops or rubs   LUNGS: clear to auscultation bilaterally; no wheezes, rales, or rhonchi   ABDOMEN: normal bowel sounds, soft, no tenderness, no distention  EXTREMITIES: peripheral pulses normal; no clubbing, cyanosis, or edema  NEURO: no focal findings   SKIN: normal without suspicious lesions on exposed skin      Labs & Results:  Below is the patient's most recent value for Albumin, ALT, AST, BUN, Calcium, Chloride, Cholesterol, CO2, Creatinine, GFR, Glucose, HDL, Hematocrit, Hemoglobin, Hemoglobin A1C, LDL, Magnesium, Phosphorus, Platelets, Potassium, PSA, Sodium, Triglycerides, and WBC.   Lab Results   Component Value Date    ALT 23 04/06/2025    AST 18 04/06/2025    BUN 17 04/06/2025    CALCIUM 8.9 04/06/2025     04/06/2025    CO2 22 04/06/2025    CREATININE 0.84 04/06/2025    HDL 44 05/01/2025    HCT 41.3 04/06/2025    HGB 13.7 04/06/2025    HGBA1C 6.0 (H) 05/01/2025    MG 2.0 06/19/2023     04/06/2025    K 4.0 04/06/2025    PSA 2.653 05/01/2025    TRIG 58 05/01/2025    WBC 4.99 04/06/2025     Note: for a comprehensive list of the patient's lab results, access the Results Review activity.          Cardiac testing:     I personally reviewed the ECG  performed in the clinic on 06/17/25. It reveals sinus rhythm heart rate of 64 bpm.     Echocardiograms:  No results found for this or any previous visit.    No results found for this or any previous visit.      Catheterizations:   No results found for this or any previous visit.      Stress Tests:  No results found for this or any previous visit.      Holter monitor -   No results found for this or any previous visit.    No results found for this or any previous visit.        ASSESSMENT/PLAN:  #1 atrial flutter  Patient is s/p atrial fibrillation ablation in 2012  Patient had atrial flutter in June 2023 with symptoms  Converted to sinus rhythm with IV Cardizem drip  Currently maintained on Cardizem and aspirin only due to low chads Vascor  EKG shows possible atypical flutter  Discussed ablation versus antiarrhythmic therapy  Patient would like to come off medications if possible and prefers to avoid ablation procedure  Status post repeat ablation with PVI, empiric CTI line and posterior wall isolation in December 2023  At a follow-up visit in January 2024 he was in sinus rhythm  Patient has come off anticoagulation and is only on aspirin 81 mg daily but not taking on a regular basis  Recommended he call us if he has atrial fibrillation again.  He may need to go on regular aspirin daily or anticoagulation and/or AV kenan blocking agents  His Rogelio Vascor is 0      Follow-up in 1 year

## 2025-07-16 ENCOUNTER — TELEPHONE (OUTPATIENT)
Age: 60
End: 2025-07-16

## 2025-07-16 ENCOUNTER — APPOINTMENT (OUTPATIENT)
Age: 60
End: 2025-07-16
Payer: COMMERCIAL

## 2025-07-16 DIAGNOSIS — R31.0 GROSS HEMATURIA: Primary | ICD-10-CM

## 2025-07-16 DIAGNOSIS — R31.0 GROSS HEMATURIA: ICD-10-CM

## 2025-07-16 LAB
BACTERIA UR QL AUTO: NORMAL /HPF
BILIRUB UR QL STRIP: NEGATIVE
CLARITY UR: ABNORMAL
COLOR UR: ABNORMAL
GLUCOSE UR STRIP-MCNC: NEGATIVE MG/DL
HGB UR QL STRIP.AUTO: NEGATIVE
KETONES UR STRIP-MCNC: NEGATIVE MG/DL
LEUKOCYTE ESTERASE UR QL STRIP: NEGATIVE
NITRITE UR QL STRIP: NEGATIVE
NON-SQ EPI CELLS URNS QL MICRO: NORMAL /HPF
PH UR STRIP.AUTO: 6 [PH]
PROT UR STRIP-MCNC: ABNORMAL MG/DL
RBC #/AREA URNS AUTO: NORMAL /HPF
SP GR UR STRIP.AUTO: 1.03 (ref 1–1.03)
UROBILINOGEN UR STRIP-ACNC: <2 MG/DL
WBC #/AREA URNS AUTO: NORMAL /HPF

## 2025-07-16 PROCEDURE — 81001 URINALYSIS AUTO W/SCOPE: CPT

## 2025-07-16 NOTE — TELEPHONE ENCOUNTER
Patients wife called, urgently requesting call back regarding her . Did not elaborate, would like to talk to Dr. Shannon. Please advise. Thank you!

## 2025-07-17 ENCOUNTER — RESULTS FOLLOW-UP (OUTPATIENT)
Age: 60
End: 2025-07-17

## 2025-07-17 NOTE — TELEPHONE ENCOUNTER
----- Message from Iesha Shannon DO sent at 7/17/2025  1:34 PM EDT -----  No infection or blood in urine. It was very discolored like in significant dehydration. He needs to increase his fluid consumption.   ----- Message -----  From: Lab, Background User  Sent: 7/16/2025  10:34 PM EDT  To: Iesha Shannon DO

## (undated) DEVICE — Device: Brand: PROTRACK PIGTAIL WIRE

## (undated) DEVICE — NEEDLE TRANSSEPTAL BRK-1 98CM

## (undated) DEVICE — Device: Brand: PENTARAY NAV

## (undated) DEVICE — Device: Brand: SMARTABLATE

## (undated) DEVICE — PINNACLE INTRODUCER SHEATH: Brand: PINNACLE

## (undated) DEVICE — Device: Brand: THERMOCOOL SMARTTOUCH SF

## (undated) DEVICE — Device: Brand: WEBSTER CS

## (undated) DEVICE — Device: Brand: REFERENCE PATCH CARTO 3